# Patient Record
Sex: MALE | Race: BLACK OR AFRICAN AMERICAN | NOT HISPANIC OR LATINO | URBAN - METROPOLITAN AREA
[De-identification: names, ages, dates, MRNs, and addresses within clinical notes are randomized per-mention and may not be internally consistent; named-entity substitution may affect disease eponyms.]

---

## 2018-08-27 ENCOUNTER — EMERGENCY (EMERGENCY)
Facility: HOSPITAL | Age: 63
LOS: 0 days | Discharge: HOME | End: 2018-08-27
Attending: EMERGENCY MEDICINE
Payer: COMMERCIAL

## 2018-08-27 VITALS
OXYGEN SATURATION: 99 % | HEIGHT: 69 IN | WEIGHT: 175.05 LBS | DIASTOLIC BLOOD PRESSURE: 72 MMHG | SYSTOLIC BLOOD PRESSURE: 141 MMHG | RESPIRATION RATE: 18 BRPM | HEART RATE: 72 BPM | TEMPERATURE: 98 F

## 2018-08-27 VITALS
SYSTOLIC BLOOD PRESSURE: 114 MMHG | RESPIRATION RATE: 18 BRPM | HEART RATE: 60 BPM | OXYGEN SATURATION: 98 % | TEMPERATURE: 98 F | DIASTOLIC BLOOD PRESSURE: 71 MMHG

## 2018-08-27 DIAGNOSIS — Z98.89 OTHER SPECIFIED POSTPROCEDURAL STATES: Chronic | ICD-10-CM

## 2018-08-27 DIAGNOSIS — Z90.49 ACQUIRED ABSENCE OF OTHER SPECIFIED PARTS OF DIGESTIVE TRACT: ICD-10-CM

## 2018-08-27 DIAGNOSIS — R42 DIZZINESS AND GIDDINESS: ICD-10-CM

## 2018-08-27 DIAGNOSIS — N20.0 CALCULUS OF KIDNEY: ICD-10-CM

## 2018-08-27 DIAGNOSIS — H53.2 DIPLOPIA: ICD-10-CM

## 2018-08-27 DIAGNOSIS — E78.00 PURE HYPERCHOLESTEROLEMIA, UNSPECIFIED: ICD-10-CM

## 2018-08-27 PROCEDURE — 70450 CT HEAD/BRAIN W/O DYE: CPT | Mod: 26

## 2018-08-27 PROCEDURE — 99284 EMERGENCY DEPT VISIT MOD MDM: CPT

## 2018-08-27 RX ORDER — MECLIZINE HCL 12.5 MG
25 TABLET ORAL ONCE
Qty: 0 | Refills: 0 | Status: COMPLETED | OUTPATIENT
Start: 2018-08-27 | End: 2018-08-27

## 2018-08-27 RX ADMIN — Medication 25 MILLIGRAM(S): at 16:56

## 2018-08-27 NOTE — ED ADULT NURSE NOTE - OBJECTIVE STATEMENT
patient A&Ox3 in no acute distress c/o of dizziness and weakness started last week , patient denied chest pain , denied headache , denied back pain no abdominal pain  , denied difficulty breathing

## 2018-08-27 NOTE — ED PROVIDER NOTE - MEDICAL DECISION MAKING DETAILS
Patient with vertigo over several months, ct head without acute findings, will discharge with omd f/u

## 2018-08-27 NOTE — ED ADULT NURSE NOTE - NSIMPLEMENTINTERV_GEN_ALL_ED
Implemented All Universal Safety Interventions:  [place] to call system. Call bell, personal items and telephone within reach. Instruct patient to call for assistance. Room bathroom lighting operational. Non-slip footwear when patient is off stretcher. Physically safe environment: no spills, clutter or unnecessary equipment. Stretcher in lowest position, wheels locked, appropriate side rails in place.

## 2018-08-27 NOTE — ED ADULT NURSE REASSESSMENT NOTE - NS ED NURSE REASSESS COMMENT FT1
patient A&Ox3 steady gait in no acute distress , discharge as orders no heplock left ER self ambulated with family on his side

## 2018-08-27 NOTE — ED ADULT TRIAGE NOTE - CHIEF COMPLAINT QUOTE
"dizzy" reports feeling dizzy starting several weeks ago, with swelling to right eye starting yesterday, with nausea, denies numbness, tingling, diarrhea, fever, chills, cp, sob, cullen.

## 2018-08-27 NOTE — ED PROVIDER NOTE - OBJECTIVE STATEMENT
62 yo male presents with vertigo and lightheadedness since last year with diplopia to right eye since that time. Patient denies chest pain, shortness of breath, fever, chills, abdominal pain, headache, trauma, head injury, neck pain, weakness, numbness/tingling, change in vision.

## 2018-09-17 ENCOUNTER — EMERGENCY (EMERGENCY)
Facility: HOSPITAL | Age: 63
LOS: 0 days | Discharge: HOME | End: 2018-09-17
Attending: STUDENT IN AN ORGANIZED HEALTH CARE EDUCATION/TRAINING PROGRAM
Payer: COMMERCIAL

## 2018-09-17 VITALS
DIASTOLIC BLOOD PRESSURE: 87 MMHG | HEIGHT: 69 IN | SYSTOLIC BLOOD PRESSURE: 139 MMHG | HEART RATE: 68 BPM | OXYGEN SATURATION: 98 % | WEIGHT: 173.94 LBS | TEMPERATURE: 98 F | RESPIRATION RATE: 17 BRPM

## 2018-09-17 VITALS
DIASTOLIC BLOOD PRESSURE: 89 MMHG | HEART RATE: 66 BPM | RESPIRATION RATE: 18 BRPM | OXYGEN SATURATION: 97 % | TEMPERATURE: 98 F | SYSTOLIC BLOOD PRESSURE: 129 MMHG

## 2018-09-17 DIAGNOSIS — R42 DIZZINESS AND GIDDINESS: ICD-10-CM

## 2018-09-17 DIAGNOSIS — E78.00 PURE HYPERCHOLESTEROLEMIA, UNSPECIFIED: ICD-10-CM

## 2018-09-17 DIAGNOSIS — Z98.89 OTHER SPECIFIED POSTPROCEDURAL STATES: Chronic | ICD-10-CM

## 2018-09-17 LAB
ALBUMIN SERPL ELPH-MCNC: 3.5 G/DL — SIGNIFICANT CHANGE UP (ref 3.3–5)
ALP SERPL-CCNC: 68 U/L — SIGNIFICANT CHANGE UP (ref 40–120)
ALT FLD-CCNC: 26 U/L — SIGNIFICANT CHANGE UP (ref 12–78)
ANION GAP SERPL CALC-SCNC: 8 MMOL/L — SIGNIFICANT CHANGE UP (ref 5–17)
APTT BLD: 35.4 SEC — SIGNIFICANT CHANGE UP (ref 27.5–37.4)
AST SERPL-CCNC: 24 U/L — SIGNIFICANT CHANGE UP (ref 15–37)
BASOPHILS # BLD AUTO: 0.03 K/UL — SIGNIFICANT CHANGE UP (ref 0–0.2)
BASOPHILS NFR BLD AUTO: 0.6 % — SIGNIFICANT CHANGE UP (ref 0–2)
BILIRUB SERPL-MCNC: 0.9 MG/DL — SIGNIFICANT CHANGE UP (ref 0.2–1.2)
BUN SERPL-MCNC: 10 MG/DL — SIGNIFICANT CHANGE UP (ref 7–23)
CALCIUM SERPL-MCNC: 8.1 MG/DL — LOW (ref 8.5–10.1)
CHLORIDE SERPL-SCNC: 106 MMOL/L — SIGNIFICANT CHANGE UP (ref 96–108)
CK MB BLD-MCNC: 0.6 % — SIGNIFICANT CHANGE UP (ref 0–3.5)
CK MB CFR SERPL CALC: 2.4 NG/ML — SIGNIFICANT CHANGE UP (ref 0.5–3.6)
CK SERPL-CCNC: 394 U/L — HIGH (ref 26–308)
CO2 SERPL-SCNC: 26 MMOL/L — SIGNIFICANT CHANGE UP (ref 22–31)
CREAT SERPL-MCNC: 0.78 MG/DL — SIGNIFICANT CHANGE UP (ref 0.5–1.3)
EOSINOPHIL # BLD AUTO: 0.07 K/UL — SIGNIFICANT CHANGE UP (ref 0–0.5)
EOSINOPHIL NFR BLD AUTO: 1.4 % — SIGNIFICANT CHANGE UP (ref 0–6)
GLUCOSE SERPL-MCNC: 92 MG/DL — SIGNIFICANT CHANGE UP (ref 70–99)
HCT VFR BLD CALC: 41.3 % — SIGNIFICANT CHANGE UP (ref 39–50)
HGB BLD-MCNC: 13.4 G/DL — SIGNIFICANT CHANGE UP (ref 13–17)
IMM GRANULOCYTES NFR BLD AUTO: 0.2 % — SIGNIFICANT CHANGE UP (ref 0–1.5)
INR BLD: 1.09 RATIO — SIGNIFICANT CHANGE UP (ref 0.88–1.16)
LYMPHOCYTES # BLD AUTO: 2.36 K/UL — SIGNIFICANT CHANGE UP (ref 1–3.3)
LYMPHOCYTES # BLD AUTO: 46.6 % — HIGH (ref 13–44)
MCHC RBC-ENTMCNC: 27.7 PG — SIGNIFICANT CHANGE UP (ref 27–34)
MCHC RBC-ENTMCNC: 32.4 GM/DL — SIGNIFICANT CHANGE UP (ref 32–36)
MCV RBC AUTO: 85.3 FL — SIGNIFICANT CHANGE UP (ref 80–100)
MONOCYTES # BLD AUTO: 0.73 K/UL — SIGNIFICANT CHANGE UP (ref 0–0.9)
MONOCYTES NFR BLD AUTO: 14.4 % — HIGH (ref 2–14)
NEUTROPHILS # BLD AUTO: 1.86 K/UL — SIGNIFICANT CHANGE UP (ref 1.8–7.4)
NEUTROPHILS NFR BLD AUTO: 36.8 % — LOW (ref 43–77)
NRBC # BLD: 0 /100 WBCS — SIGNIFICANT CHANGE UP (ref 0–0)
PLATELET # BLD AUTO: 246 K/UL — SIGNIFICANT CHANGE UP (ref 150–400)
POTASSIUM SERPL-MCNC: 4 MMOL/L — SIGNIFICANT CHANGE UP (ref 3.5–5.3)
POTASSIUM SERPL-SCNC: 4 MMOL/L — SIGNIFICANT CHANGE UP (ref 3.5–5.3)
PROT SERPL-MCNC: 7 GM/DL — SIGNIFICANT CHANGE UP (ref 6–8.3)
PROTHROM AB SERPL-ACNC: 11.9 SEC — SIGNIFICANT CHANGE UP (ref 9.8–12.7)
RBC # BLD: 4.84 M/UL — SIGNIFICANT CHANGE UP (ref 4.2–5.8)
RBC # FLD: 13.7 % — SIGNIFICANT CHANGE UP (ref 10.3–14.5)
SODIUM SERPL-SCNC: 140 MMOL/L — SIGNIFICANT CHANGE UP (ref 135–145)
TROPONIN I SERPL-MCNC: <.015 NG/ML — SIGNIFICANT CHANGE UP (ref 0.01–0.04)
WBC # BLD: 5.06 K/UL — SIGNIFICANT CHANGE UP (ref 3.8–10.5)
WBC # FLD AUTO: 5.06 K/UL — SIGNIFICANT CHANGE UP (ref 3.8–10.5)

## 2018-09-17 PROCEDURE — 70450 CT HEAD/BRAIN W/O DYE: CPT | Mod: 26

## 2018-09-17 PROCEDURE — 74177 CT ABD & PELVIS W/CONTRAST: CPT | Mod: 26

## 2018-09-17 PROCEDURE — 99284 EMERGENCY DEPT VISIT MOD MDM: CPT

## 2018-09-17 PROCEDURE — 93010 ELECTROCARDIOGRAM REPORT: CPT

## 2018-09-17 PROCEDURE — 70551 MRI BRAIN STEM W/O DYE: CPT | Mod: 26

## 2018-09-17 RX ORDER — SODIUM CHLORIDE 9 MG/ML
3 INJECTION INTRAMUSCULAR; INTRAVENOUS; SUBCUTANEOUS ONCE
Qty: 0 | Refills: 0 | Status: COMPLETED | OUTPATIENT
Start: 2018-09-17 | End: 2018-09-17

## 2018-09-17 RX ORDER — MECLIZINE HCL 12.5 MG
1 TABLET ORAL
Qty: 45 | Refills: 0
Start: 2018-09-17 | End: 2018-10-01

## 2018-09-17 RX ADMIN — SODIUM CHLORIDE 3 MILLILITER(S): 9 INJECTION INTRAMUSCULAR; INTRAVENOUS; SUBCUTANEOUS at 11:01

## 2018-09-17 NOTE — ED PROVIDER NOTE - PROGRESS NOTE DETAILS
pt does feel better c/o sob with exertion, currently on prednisone which his pmd dr loera tappered and feels he needs increased and is on home o2 2l 24 hours dr loera called, case discussed, wants pt to increase hisprednisone to 20mg daily and see him in the office thursday or fridady

## 2018-09-17 NOTE — ED ADULT NURSE NOTE - NSIMPLEMENTINTERV_GEN_ALL_ED
Implemented All Fall Risk Interventions:  [place] to call system. Call bell, personal items and telephone within reach. Instruct patient to call for assistance. Room bathroom lighting operational. Non-slip footwear when patient is off stretcher. Physically safe environment: no spills, clutter or unnecessary equipment. Stretcher in lowest position, wheels locked, appropriate side rails in place. Provide visual cue, wrist band, yellow gown, etc. Monitor gait and stability. Monitor for mental status changes and reorient to person, place, and time. Review medications for side effects contributing to fall risk. Reinforce activity limits and safety measures with patient and family.

## 2018-09-17 NOTE — ED ADULT TRIAGE NOTE - CHIEF COMPLAINT QUOTE
Dizziness, weakness, lightheaded, right lower back and left ankle pains, states he became dizzy and lost consciousness on Sunday and was evaluated by 911 ems after twisting left ankle while going down steps and returning to his sons room.

## 2018-09-17 NOTE — ED PROVIDER NOTE - OBJECTIVE STATEMENT
63 year old male presents today c/o dizziness which has been chronic and intermittent for the last 5-6 months, pt reports that on Friday evening he was helping his son move when he became dizzy and past out for few seconds, his wife states that he last ate the day between 9-10am and this occurred at 11pm, he also states that he twisted his left ankle going down a step, five minutes later he experienced feeling dizzy (-) chest pain (-) sob (-) nausea or vomiting (-) palpitations (-) melena (-) hematuria

## 2018-09-17 NOTE — ED ADULT NURSE NOTE - OBJECTIVE STATEMENT
patient received, alert and oriented x4, came here for dizziness, stated two days ago tripped and fell, sprained left ankle and after 5 minutes felt spinning dizzy like about to pass out, woke up this morning with dizziness, denies n/v, denies chest pain

## 2020-03-24 ENCOUNTER — INPATIENT (INPATIENT)
Facility: HOSPITAL | Age: 65
LOS: 8 days | Discharge: HOME HEALTH | End: 2020-04-02
Attending: INTERNAL MEDICINE | Admitting: INTERNAL MEDICINE
Payer: COMMERCIAL

## 2020-03-24 VITALS
DIASTOLIC BLOOD PRESSURE: 77 MMHG | TEMPERATURE: 101 F | HEART RATE: 85 BPM | WEIGHT: 175.05 LBS | SYSTOLIC BLOOD PRESSURE: 144 MMHG | OXYGEN SATURATION: 96 % | HEIGHT: 67 IN | RESPIRATION RATE: 21 BRPM

## 2020-03-24 DIAGNOSIS — R55 SYNCOPE AND COLLAPSE: ICD-10-CM

## 2020-03-24 DIAGNOSIS — J18.9 PNEUMONIA, UNSPECIFIED ORGANISM: ICD-10-CM

## 2020-03-24 DIAGNOSIS — E78.00 PURE HYPERCHOLESTEROLEMIA, UNSPECIFIED: ICD-10-CM

## 2020-03-24 DIAGNOSIS — Z98.89 OTHER SPECIFIED POSTPROCEDURAL STATES: Chronic | ICD-10-CM

## 2020-03-24 DIAGNOSIS — K57.92 DIVERTICULITIS OF INTESTINE, PART UNSPECIFIED, WITHOUT PERFORATION OR ABSCESS WITHOUT BLEEDING: ICD-10-CM

## 2020-03-24 PROBLEM — H05.821: Chronic | Status: ACTIVE | Noted: 2018-09-17

## 2020-03-24 LAB
ALBUMIN SERPL ELPH-MCNC: 2.4 G/DL — LOW (ref 3.3–5)
ALP SERPL-CCNC: 52 U/L — SIGNIFICANT CHANGE UP (ref 40–120)
ALT FLD-CCNC: 47 U/L — SIGNIFICANT CHANGE UP (ref 12–78)
ANION GAP SERPL CALC-SCNC: 5 MMOL/L — SIGNIFICANT CHANGE UP (ref 5–17)
AST SERPL-CCNC: 74 U/L — HIGH (ref 15–37)
BASOPHILS # BLD AUTO: 0.02 K/UL — SIGNIFICANT CHANGE UP (ref 0–0.2)
BASOPHILS NFR BLD AUTO: 0.3 % — SIGNIFICANT CHANGE UP (ref 0–2)
BILIRUB SERPL-MCNC: 0.8 MG/DL — SIGNIFICANT CHANGE UP (ref 0.2–1.2)
BUN SERPL-MCNC: 15 MG/DL — SIGNIFICANT CHANGE UP (ref 7–23)
CALCIUM SERPL-MCNC: 8 MG/DL — LOW (ref 8.5–10.1)
CHLORIDE SERPL-SCNC: 101 MMOL/L — SIGNIFICANT CHANGE UP (ref 96–108)
CK MB CFR SERPL CALC: <1 NG/ML — SIGNIFICANT CHANGE UP (ref 0.5–3.6)
CO2 SERPL-SCNC: 28 MMOL/L — SIGNIFICANT CHANGE UP (ref 22–31)
CREAT SERPL-MCNC: 1.68 MG/DL — HIGH (ref 0.5–1.3)
EOSINOPHIL # BLD AUTO: 0 K/UL — SIGNIFICANT CHANGE UP (ref 0–0.5)
EOSINOPHIL NFR BLD AUTO: 0 % — SIGNIFICANT CHANGE UP (ref 0–6)
FLU A RESULT: SIGNIFICANT CHANGE UP
FLU A RESULT: SIGNIFICANT CHANGE UP
FLUAV AG NPH QL: SIGNIFICANT CHANGE UP
FLUBV AG NPH QL: SIGNIFICANT CHANGE UP
GLUCOSE SERPL-MCNC: 100 MG/DL — HIGH (ref 70–99)
HCT VFR BLD CALC: 45.5 % — SIGNIFICANT CHANGE UP (ref 39–50)
HGB BLD-MCNC: 15.3 G/DL — SIGNIFICANT CHANGE UP (ref 13–17)
IMM GRANULOCYTES NFR BLD AUTO: 0.7 % — SIGNIFICANT CHANGE UP (ref 0–1.5)
LACTATE SERPL-SCNC: 2.3 MMOL/L — HIGH (ref 0.7–2)
LYMPHOCYTES # BLD AUTO: 0.95 K/UL — LOW (ref 1–3.3)
LYMPHOCYTES # BLD AUTO: 12.4 % — LOW (ref 13–44)
MAGNESIUM SERPL-MCNC: 2.1 MG/DL — SIGNIFICANT CHANGE UP (ref 1.6–2.6)
MCHC RBC-ENTMCNC: 28 PG — SIGNIFICANT CHANGE UP (ref 27–34)
MCHC RBC-ENTMCNC: 33.6 GM/DL — SIGNIFICANT CHANGE UP (ref 32–36)
MCV RBC AUTO: 83.2 FL — SIGNIFICANT CHANGE UP (ref 80–100)
MONOCYTES # BLD AUTO: 1.07 K/UL — HIGH (ref 0–0.9)
MONOCYTES NFR BLD AUTO: 14 % — SIGNIFICANT CHANGE UP (ref 2–14)
NEUTROPHILS # BLD AUTO: 5.55 K/UL — SIGNIFICANT CHANGE UP (ref 1.8–7.4)
NEUTROPHILS NFR BLD AUTO: 72.6 % — SIGNIFICANT CHANGE UP (ref 43–77)
NRBC # BLD: 0 /100 WBCS — SIGNIFICANT CHANGE UP (ref 0–0)
PLATELET # BLD AUTO: 283 K/UL — SIGNIFICANT CHANGE UP (ref 150–400)
POTASSIUM SERPL-MCNC: 3.8 MMOL/L — SIGNIFICANT CHANGE UP (ref 3.5–5.3)
POTASSIUM SERPL-SCNC: 3.8 MMOL/L — SIGNIFICANT CHANGE UP (ref 3.5–5.3)
PROT SERPL-MCNC: 6.9 GM/DL — SIGNIFICANT CHANGE UP (ref 6–8.3)
RBC # BLD: 5.47 M/UL — SIGNIFICANT CHANGE UP (ref 4.2–5.8)
RBC # FLD: 13.2 % — SIGNIFICANT CHANGE UP (ref 10.3–14.5)
RSV RESULT: SIGNIFICANT CHANGE UP
RSV RNA RESP QL NAA+PROBE: SIGNIFICANT CHANGE UP
SODIUM SERPL-SCNC: 134 MMOL/L — LOW (ref 135–145)
TROPONIN I SERPL-MCNC: <.015 NG/ML — SIGNIFICANT CHANGE UP (ref 0.01–0.04)
WBC # BLD: 7.64 K/UL — SIGNIFICANT CHANGE UP (ref 3.8–10.5)
WBC # FLD AUTO: 7.64 K/UL — SIGNIFICANT CHANGE UP (ref 3.8–10.5)

## 2020-03-24 PROCEDURE — 99285 EMERGENCY DEPT VISIT HI MDM: CPT

## 2020-03-24 PROCEDURE — 74177 CT ABD & PELVIS W/CONTRAST: CPT | Mod: 26

## 2020-03-24 PROCEDURE — 99223 1ST HOSP IP/OBS HIGH 75: CPT

## 2020-03-24 PROCEDURE — 93010 ELECTROCARDIOGRAM REPORT: CPT

## 2020-03-24 PROCEDURE — 71045 X-RAY EXAM CHEST 1 VIEW: CPT | Mod: 26

## 2020-03-24 PROCEDURE — 99222 1ST HOSP IP/OBS MODERATE 55: CPT

## 2020-03-24 RX ORDER — PYRIDOSTIGMINE BROMIDE 60 MG/5ML
60 SOLUTION ORAL THREE TIMES A DAY
Refills: 0 | Status: DISCONTINUED | OUTPATIENT
Start: 2020-03-24 | End: 2020-04-02

## 2020-03-24 RX ORDER — ACETAMINOPHEN 500 MG
650 TABLET ORAL EVERY 6 HOURS
Refills: 0 | Status: DISCONTINUED | OUTPATIENT
Start: 2020-03-24 | End: 2020-03-25

## 2020-03-24 RX ORDER — PIPERACILLIN AND TAZOBACTAM 4; .5 G/20ML; G/20ML
3.38 INJECTION, POWDER, LYOPHILIZED, FOR SOLUTION INTRAVENOUS EVERY 8 HOURS
Refills: 0 | Status: DISCONTINUED | OUTPATIENT
Start: 2020-03-24 | End: 2020-03-25

## 2020-03-24 RX ORDER — SODIUM CHLORIDE 9 MG/ML
1000 INJECTION INTRAMUSCULAR; INTRAVENOUS; SUBCUTANEOUS
Refills: 0 | Status: DISCONTINUED | OUTPATIENT
Start: 2020-03-24 | End: 2020-03-29

## 2020-03-24 RX ORDER — PIPERACILLIN AND TAZOBACTAM 4; .5 G/20ML; G/20ML
3.38 INJECTION, POWDER, LYOPHILIZED, FOR SOLUTION INTRAVENOUS ONCE
Refills: 0 | Status: COMPLETED | OUTPATIENT
Start: 2020-03-24 | End: 2020-03-24

## 2020-03-24 RX ORDER — VANCOMYCIN HCL 1 G
1000 VIAL (EA) INTRAVENOUS ONCE
Refills: 0 | Status: COMPLETED | OUTPATIENT
Start: 2020-03-24 | End: 2020-03-24

## 2020-03-24 RX ORDER — AZITHROMYCIN 500 MG/1
500 TABLET, FILM COATED ORAL DAILY
Refills: 0 | Status: DISCONTINUED | OUTPATIENT
Start: 2020-03-24 | End: 2020-03-25

## 2020-03-24 RX ADMIN — PIPERACILLIN AND TAZOBACTAM 200 GRAM(S): 4; .5 INJECTION, POWDER, LYOPHILIZED, FOR SOLUTION INTRAVENOUS at 16:50

## 2020-03-24 RX ADMIN — PYRIDOSTIGMINE BROMIDE 60 MILLIGRAM(S): 60 SOLUTION ORAL at 22:00

## 2020-03-24 RX ADMIN — Medication 250 MILLIGRAM(S): at 16:35

## 2020-03-24 RX ADMIN — AZITHROMYCIN 500 MILLIGRAM(S): 500 TABLET, FILM COATED ORAL at 22:00

## 2020-03-24 NOTE — H&P ADULT - ASSESSMENT
64 years old male with history of [id number] treated diverticultiis presents with syncope     IMPROVE VTE Individual Risk Assessment          RISK                                                          Points  [  ] Previous VTE                                                3  [  ] Thrombophilia                                             2  [  ] Lower limb paralysis                                   2        (unable to hold up >15 seconds)    [  ] Current Cancer                                             2         (within 6 months)  [  ] Immobilization > 24 hrs                              1  [  ] ICU/CCU stay > 24 hours                             1  [  ] Age > 60                                                         1    IMPROVE VTE Score: 2

## 2020-03-24 NOTE — ED PROVIDER NOTE - NSFOLLOWUPINSTRUCTIONS_ED_ALL_ED_FT
Continue to take medication as prescribed.   Follow up with the surgeons at [facility] as soon as possible.

## 2020-03-24 NOTE — H&P ADULT - NSHPREVIEWOFSYSTEMS_GEN_ALL_CORE

## 2020-03-24 NOTE — ED PROVIDER NOTE - OBJECTIVE STATEMENT
63yo male with pmh of MG presents from PMD office for syncopal episode today. Complains of worsening nausea with lack of appetite and subjective fever/chills x 1 week. States that he was diagnosed with diverticulitis 2 weeks ago and put on abx for it. Went to PMD for worsening symptoms today, felt very dizzy and fainted. States that PMD said he did not hit his head. Denies abdominal pain, vomiting, diarrhea, bloody in stool/black stool, chest pain, palpitations, sob, urinary incontinence and other assocaited sx.

## 2020-03-24 NOTE — ED PROVIDER NOTE - PROGRESS NOTE DETAILS
Spoke with gen surg about CT findings - will come down to see pt Surgery states that diverticulitis is not acute. Nausea seems to be from flagyl. Explained to pt he must complete abx and follow up with surgery at [facility]

## 2020-03-24 NOTE — ED PROVIDER NOTE - ATTENDING CONTRIBUTION TO CARE
agree with pa    in brief, mr stevens pw abd pain and fever. on exam, crackles bl. abd soft nt. ct shows diverticulitis with abscess. seen by surgery, no acute intervention recommended. will broad spectrum cover for pna. test for covid. will admit.

## 2020-03-24 NOTE — ED ADULT NURSE NOTE - NSIMPLEMENTINTERV_GEN_ALL_ED
Implemented All Universal Safety Interventions:  [place] to call system. Call bell, personal items and telephone within reach. Instruct patient to call for assistance. Room bathroom lighting operational. Non-slip footwear when patient is off stretcher. Physically safe environment: no spills, clutter or unnecessary equipment. Stretcher in lowest position, wheels locked, appropriate side rails in place.

## 2020-03-24 NOTE — CONSULT NOTE ADULT - SUBJECTIVE AND OBJECTIVE BOX
General Surgery Consult  HPI: 63y/o male with h/o Mystenia Gravis, vertigo, PMH of appendectomy presents from PMD office for syncopal episode today. Pt went to PMD today for follow-up visit, pt states that 2019 he was admitted to [facility] and treated for acute diverticulitis w/ abscess. Pt was supposed to follow-up with surgical service for repeat Ct scan but did not. Last Wed he had severe abdominal pain and was seen again at [facility], CT showed acute diverticulitis he was discharged on antibiotics, pt states that he hasnt been taking abx as prescribed as they make him very nausea and decreased appetite. Pt states that abdominal pain is markedly improved since being on ABX. Denies f/c, no cp or sob, denies vomiting, diarrhea, bloody in stool/black stool, palpitations, urinary incontinence and other associated sx. Pt states that on his last CT scan he was told he had lung abnormalities that were stable and that he should follow-up with his PCP.     Review of Systems:  · CONSTITUTIONAL:   · Constitutional [+]: CHILLS, FEVER  · EYES: no discharge, no irritation, no pain, no redness, and no visual changes.  · ENMT: Ears: no ear pain and no hearing problems. Nose: no nasal congestion and no nasal drainage. Mouth/Throat: no dysphagia, no hoarseness and no throat pain. Neck: no lumps, no pain, no stiffness and no swollen glands.  · CARDIOVASCULAR: no chest pain and no edema.  · RESPIRATORY: no chest pain, no cough, and no shortness of breath.  · GASTROINTESTINAL: - - -  · Gastrointestinal [+]: NAUSEA  · Gastrointestinal [-]: no abdominal pain, no diarrhea, no melena, no vomiting  · GENITOURINARY: no dysuria, no frequency, and no hematuria.  · MUSCULOSKELETAL: no back pain, no gout, no musculoskeletal pain, no neck pain, and no weakness.  · SKIN: no abrasions, no jaundice, no lesions, no pruritis, and no rashes.  · NEURO: no loss of consciousness, no gait abnormality, no headache, no sensory deficits, and no weakness.    PAST MEDICAL HISTORY:  Eye muscle weakness, right  High cholesterol  Kidney stones    PAST SURGICAL HISTORY:  History of appendectomy     Famiy Hx: parents  of natural causes, Sister: DM  Social Hx: denies smoking, alcohol socially  MEDICATIONS:  cannot recall, unable to contact pharmacy    ALLERGIES:  No Known Allergie    VITALS & I/Os:  Vital Signs Last 24 Hrs  T(C): 36.4 (24 Mar 2020 17:50), Max: 38.2 (24 Mar 2020 11:14)  T(F): 97.5 (24 Mar 2020 17:50), Max: 100.8 (24 Mar 2020 11:14)  HR: 85 (24 Mar 2020 11:14) (85 - 85)  BP: 135/65 (24 Mar 2020 17:50) (135/65 - 144/77)  BP(mean): --  RR: 16 (24 Mar 2020 17:50) (16 - 21)  SpO2: 97% (24 Mar 2020 17:50) (96% - 97%)      PHYSICAL EXAM:   · CONSTITUTIONAL: Awake, alert, oriented to person, place, time/situation and in no apparent distress.  · ENMT: NC/AT. Airway patent, Mouth with normal mucosa.  · EYES: Clear bilaterally,PERRL  · CARDIAC: RRR. s1, s2  · RESPIRATORY: Breath sounds clear and equal bilaterally.  · GASTROINTESTINAL: ND, +BS, soft, reducible umbilical hernia, mild ttp of LLQ, no guarding.  · MUSCULOSKELETAL: Spine appears normal, range of motion is not limited, no muscle or joint tenderness  · NEUROLOGICAL: Alert and oriented, no focal deficits, no motor or sensory deficits.  · SKIN: Skin normal color for race, warm, dry and intact. No evidence of rash.      I&O's Summary  LABS:                        15.3   7.64  )-----------( 283      ( 24 Mar 2020 12:48 )             45.5     0324    134<L>  |  101  |  15  ----------------------------<  100<H>  3.8   |  28  |  1.68<H>    Ca    8.0<L>      24 Mar 2020 12:48  Mg     2.1     -24    TPro  6.9  /  Alb  2.4<L>  /  TBili  0.8  /  DBili  x   /  AST  74<H>  /  ALT  47  /  AlkPhos  52  03-24    Lactate: Lactate, Blood: 2.3 mmol/L ( @ 17:49)    CARDIAC MARKERS ( 24 Mar 2020 12:48 )  <.015 ng/mL / x     / x     / x     / <1.0 ng/mL    IMAGING:  < from: CT Abdomen and Pelvis w/ IV Cont (20 @ 15:36) >  FINDINGS:    LOWER CHEST: Patchy consolidative opacity at the right lung base and smaller opacity at the left lung base.    LIVER: Within normal limits.  BILE DUCTS: Normal caliber.  GALLBLADDER: Within normal limits.  SPLEEN: Within normal limits.  PANCREAS: Within normal limits.  ADRENALS: Within normal limits.  KIDNEYS/URETERS: Bilateral parapelvic and left parenchymal cysts. No hydronephrosis.    BLADDER: Within normal limits.  REPRODUCTIVE ORGANS:    BOWEL: No bowel obstruction. Appendix is not identified. Colonic diverticulosis with focal inflammatory stranding involving the proximal sigmoid colon compatible with acute diverticulitis. 2.8 x 2.0 x 1.7 cm fluid collection within the bowel wall is suspicious for intramural abscess (2, 80).  PERITONEUM: No ascites.  VESSELS: Atherosclerotic changes.  RETROPERITONEUM/LYMPH NODES: No lymphadenopathy.    ABDOMINAL WALL: Small fat-containing umbilical hernia. Small bilateral fat-containing inguinal hernias.  BONES: Degenerative changes.    IMPRESSION:     1. Acute diverticulitis of the proximal sigmoid colon with suspected intramural abscess measuring 2.8 x 2.0 x 1.7 cm.  2. Bilateral airspace opacities in the lung bases, right greater than left, suspicious for pneumonia. Findings are somewhat atypical for COVID,

## 2020-03-24 NOTE — H&P ADULT - NSHPPHYSICALEXAM_GEN_ALL_CORE
ICU Vital Signs Last 24 Hrs  T(C): 36.4 (24 Mar 2020 17:50), Max: 38.2 (24 Mar 2020 11:14)  T(F): 97.5 (24 Mar 2020 17:50), Max: 100.8 (24 Mar 2020 11:14)  HR: 85 (24 Mar 2020 11:14) (85 - 85)  BP: 135/65 (24 Mar 2020 17:50) (135/65 - 144/77)  BP(mean): --  ABP: --  ABP(mean): --  RR: 16 (24 Mar 2020 17:50) (16 - 21)  SpO2: 97% (24 Mar 2020 17:50) (96% - 97%)  GENERAL: NAD well-developed  HEAD:  Atraumatic, Normocephalic  EYES: EOMI, PERRLA, conjunctiva and sclera clear  ENMT: No tonsillar erythema, exudates, or enlargement; Moist mucous membranes, Good dentition, No lesions  NECK: Supple, No JVD, Normal thyroid  NERVOUS SYSTEM:  Alert & Oriented X3, Good concentration; Motor Strength 5/5 B/L upper and lower extremities; DTRs 2+ intact and symmetric  CHEST/LUNG: Clear to percussion bilaterally; No rales, rhonchi, wheezing, or rubs  HEART: Regular rate and rhythm; No murmurs, rubs, or gallops  ABDOMEN: Soft, Nontender, Nondistended; Bowel sounds present  EXTREMITIES:  2+ Peripheral Pulses, No clubbing, cyanosis, or edema  LYMPH: No lymphadenopathy   SKIN: No rashes or lesions

## 2020-03-24 NOTE — CONSULT NOTE ADULT - ATTENDING COMMENTS
Patient seen and examined with the surgical team.  The recent CT scan report and images were reviewed and revealed focal inflammation of the proximal sigmoid colon with a 2.8 x 2.0 x 1.7 cm intramural abscess.  He currently denies abdominal pain and has been tolerating clears.  He has been experiencing intermittent nausea which he thinks may be secondary to vertigo from his myasthenia gravis.  He is non-tender.  I explained to Mr. Colin that as this is now his second episode of complicated diverticulitis, he is a candidate for an elective colon resection.  I have therefore recommended that he be evaluated by colorectal surgery.  In the interim, he appears clinically stable and does not warrant an emergent operation.  Unfortunately, due to the size and location of his abscess, percutaneous drainage is likely not an option.  Recommend advancing to a low fiber diet as tolerated and continuing with antibiotics per ID recommendations.  Please call with questions.

## 2020-03-24 NOTE — CONSULT NOTE ADULT - ASSESSMENT
63 y/o male with MG presenting s/p near syncopal episode, CT scan shows acute diverticulitis with possible intramural abscess, also B/l airspace opacities of lung bases  - admit to medical service  - IV hydration if not contraindicated  - cont IV cipro/flagyl  - ID/ Pulm/ GI consult  - f/u COVID swab results  - clear liqid diet  - GI PPX: zofran  - pain management  - no acute surgical intervention at this time

## 2020-03-24 NOTE — ED ADULT TRIAGE NOTE - CHIEF COMPLAINT QUOTE
Ems states, " Pt has been having nausea, and abdominal pain for several days, hx diverticulitis , pt was at md office and synopsized in office " denies sob, admits to  chills and body aches. 18G IV to right ac

## 2020-03-24 NOTE — ED PROVIDER NOTE - CLINICAL SUMMARY MEDICAL DECISION MAKING FREE TEXT BOX
63yo male with pmh of MG presents from PMD office for syncopal episode today after worsening nausea, body aches/chills and dizziness x 1 week. Febrile sating at 96 on RA. Abdomen benign. Low suspicion for GI pathology given symptoms. Already on abx for diverticulitis. Concerning for covid vs pna. Will get labs, NIF given MG and cxr.

## 2020-03-24 NOTE — H&P ADULT - HISTORY OF PRESENT ILLNESS
65yo male with pmh of MG presents from PMD office for syncopal episode today. Complains of worsening nausea with lack of appetite and subjective fever/chills x 1 week. States that he was diagnosed with diverticulitis 2 weeks ago and put on abx for it. Went to PMD for worsening symptoms today, felt very dizzy and fainted. States that PMD said he did not hit his head. Denies abdominal pain, vomiting, diarrhea, bloody in stool/black stool, chest pain, palpitations, sob, urinary incontinence and other assocaited sx.

## 2020-03-25 LAB
ANION GAP SERPL CALC-SCNC: 7 MMOL/L — SIGNIFICANT CHANGE UP (ref 5–17)
BUN SERPL-MCNC: 18 MG/DL — SIGNIFICANT CHANGE UP (ref 7–23)
CALCIUM SERPL-MCNC: 7.8 MG/DL — LOW (ref 8.5–10.1)
CHLORIDE SERPL-SCNC: 103 MMOL/L — SIGNIFICANT CHANGE UP (ref 96–108)
CO2 SERPL-SCNC: 24 MMOL/L — SIGNIFICANT CHANGE UP (ref 22–31)
CREAT SERPL-MCNC: 2.04 MG/DL — HIGH (ref 0.5–1.3)
CRP SERPL-MCNC: 9.27 MG/DL — HIGH (ref 0–0.4)
GLUCOSE SERPL-MCNC: 100 MG/DL — HIGH (ref 70–99)
HCT VFR BLD CALC: 39.9 % — SIGNIFICANT CHANGE UP (ref 39–50)
HCV AB S/CO SERPL IA: 0.14 S/CO — SIGNIFICANT CHANGE UP (ref 0–0.99)
HCV AB SERPL-IMP: SIGNIFICANT CHANGE UP
HGB BLD-MCNC: 12.9 G/DL — LOW (ref 13–17)
LACTATE SERPL-SCNC: 1.4 MMOL/L — SIGNIFICANT CHANGE UP (ref 0.7–2)
LDH SERPL L TO P-CCNC: 608 U/L — HIGH (ref 50–242)
MCHC RBC-ENTMCNC: 27.4 PG — SIGNIFICANT CHANGE UP (ref 27–34)
MCHC RBC-ENTMCNC: 32.3 GM/DL — SIGNIFICANT CHANGE UP (ref 32–36)
MCV RBC AUTO: 84.7 FL — SIGNIFICANT CHANGE UP (ref 80–100)
NRBC # BLD: 0 /100 WBCS — SIGNIFICANT CHANGE UP (ref 0–0)
PLATELET # BLD AUTO: 279 K/UL — SIGNIFICANT CHANGE UP (ref 150–400)
POTASSIUM SERPL-MCNC: 3.5 MMOL/L — SIGNIFICANT CHANGE UP (ref 3.5–5.3)
POTASSIUM SERPL-SCNC: 3.5 MMOL/L — SIGNIFICANT CHANGE UP (ref 3.5–5.3)
RBC # BLD: 4.71 M/UL — SIGNIFICANT CHANGE UP (ref 4.2–5.8)
RBC # FLD: 13.2 % — SIGNIFICANT CHANGE UP (ref 10.3–14.5)
SARS-COV-2 RNA SPEC QL NAA+PROBE: SIGNIFICANT CHANGE UP
SODIUM SERPL-SCNC: 134 MMOL/L — LOW (ref 135–145)
VANCOMYCIN FLD-MCNC: 1.7 UG/ML — SIGNIFICANT CHANGE UP
WBC # BLD: 9.01 K/UL — SIGNIFICANT CHANGE UP (ref 3.8–10.5)
WBC # FLD AUTO: 9.01 K/UL — SIGNIFICANT CHANGE UP (ref 3.8–10.5)

## 2020-03-25 PROCEDURE — 99223 1ST HOSP IP/OBS HIGH 75: CPT

## 2020-03-25 PROCEDURE — 99233 SBSQ HOSP IP/OBS HIGH 50: CPT

## 2020-03-25 RX ORDER — AZITHROMYCIN 500 MG/1
500 TABLET, FILM COATED ORAL EVERY 24 HOURS
Refills: 0 | Status: COMPLETED | OUTPATIENT
Start: 2020-03-25 | End: 2020-03-28

## 2020-03-25 RX ORDER — MEROPENEM 1 G/30ML
1000 INJECTION INTRAVENOUS EVERY 12 HOURS
Refills: 0 | Status: COMPLETED | OUTPATIENT
Start: 2020-03-25 | End: 2020-04-01

## 2020-03-25 RX ORDER — ACETAMINOPHEN 500 MG
650 TABLET ORAL EVERY 6 HOURS
Refills: 0 | Status: DISCONTINUED | OUTPATIENT
Start: 2020-03-25 | End: 2020-04-02

## 2020-03-25 RX ADMIN — Medication 650 MILLIGRAM(S): at 04:58

## 2020-03-25 RX ADMIN — MEROPENEM 100 MILLIGRAM(S): 1 INJECTION INTRAVENOUS at 17:07

## 2020-03-25 RX ADMIN — SODIUM CHLORIDE 75 MILLILITER(S): 9 INJECTION INTRAMUSCULAR; INTRAVENOUS; SUBCUTANEOUS at 02:55

## 2020-03-25 RX ADMIN — SODIUM CHLORIDE 75 MILLILITER(S): 9 INJECTION INTRAMUSCULAR; INTRAVENOUS; SUBCUTANEOUS at 22:58

## 2020-03-25 RX ADMIN — PYRIDOSTIGMINE BROMIDE 60 MILLIGRAM(S): 60 SOLUTION ORAL at 05:43

## 2020-03-25 RX ADMIN — Medication 650 MILLIGRAM(S): at 11:14

## 2020-03-25 RX ADMIN — PIPERACILLIN AND TAZOBACTAM 25 GRAM(S): 4; .5 INJECTION, POWDER, LYOPHILIZED, FOR SOLUTION INTRAVENOUS at 08:29

## 2020-03-25 RX ADMIN — Medication 650 MILLIGRAM(S): at 10:58

## 2020-03-25 RX ADMIN — AZITHROMYCIN 500 MILLIGRAM(S): 500 TABLET, FILM COATED ORAL at 22:55

## 2020-03-25 RX ADMIN — Medication 650 MILLIGRAM(S): at 04:32

## 2020-03-25 RX ADMIN — PYRIDOSTIGMINE BROMIDE 60 MILLIGRAM(S): 60 SOLUTION ORAL at 15:15

## 2020-03-25 RX ADMIN — SODIUM CHLORIDE 75 MILLILITER(S): 9 INJECTION INTRAMUSCULAR; INTRAVENOUS; SUBCUTANEOUS at 15:15

## 2020-03-25 RX ADMIN — PIPERACILLIN AND TAZOBACTAM 25 GRAM(S): 4; .5 INJECTION, POWDER, LYOPHILIZED, FOR SOLUTION INTRAVENOUS at 00:55

## 2020-03-25 RX ADMIN — PYRIDOSTIGMINE BROMIDE 60 MILLIGRAM(S): 60 SOLUTION ORAL at 22:55

## 2020-03-25 NOTE — CONSULT NOTE ADULT - ASSESSMENT
HPI:  63yo male with pmh of MG presents from PMD office for syncopal episode today. Complains of worsening nausea with lack of appetite and subjective fever/chills x 1 week. States that he was diagnosed with diverticulitis 2 weeks ago and put on abx for it. Went to PMD for worsening symptoms today, felt very dizzy and fainted. States that PMD said he did not hit his head. Denies abdominal pain, vomiting, diarrhea, bloody in stool/black stool, chest pain, palpitations, sob, urinary incontinence and other assocaited sx. (24 Mar 2020 19:35)  ------------------------- As Above ------------------------------- Patient seen earlier today  The patient states he was hospitalized for two days with a diagnosis of diverticulitis last week. He was discharged with two antibiotics. He finished one but the second one was not completed because it made him nauseous. He never did have a recurrence of abdominal pain but was very nauseous.   Patient presented with a syncopal episode.   Also had an episode of diverticulitis last October  Still feels nauseous today Tolerated clear liquids today. Had a normal colonoscopy 4 years ago.    Diverticulitis with abscess. Second episode. On Merrem. 1) Full liquid diet and observe  2) CRP  3) old records

## 2020-03-25 NOTE — PROGRESS NOTE ADULT - SUBJECTIVE AND OBJECTIVE BOX
Patient seen and examined bedside resting comfortably.  Reports dull intermittent LLQ abdominal pain, improved since admission, presently 5/10 in severity.  +BMs.  Tolerating clear liquid diet but admits to nausea at times.  C/o dizziness which he believes to be related to myasthenia gravis.    T(F): 101.3 (03-25-20 @ 04:23), Max: 102.7 (03-24-20 @ 23:45)  HR: 94 (03-25-20 @ 04:23) (85 - 96)  BP: 136/71 (03-25-20 @ 04:23) (135/65 - 155/70)  RR: 18 (03-25-20 @ 04:23) (16 - 21)  SpO2: 96% (03-25-20 @ 04:23) (95% - 98%)  Wt(kg): --    PHYSICAL EXAM:  General: NAD, WDWN  Neuro:  Alert & oriented x 3  HEENT: NCAT, EOMI, conjunctiva clear  CV: +S1+S2 regular rate and rhythm  Lung: clear to auscultation bilaterally, respirations nonlabored, good inspiratory effort  Abdomen: soft, NTND. Normoactive BS  Extremities: no pedal edema or calf tenderness noted     LABS:    pending    A/P: 65 y/o male hx MG presenting s/p near syncopal episode,   findings of acute diverticulitis with intramural abscess on CT  b/l airspace opacities likely pneumonia; COVID negative  BARBARA  - continue abx, monitor for fevers, Follow up repeat lactate  - clear liquid diet as tolerated, continue to monitor for abdominal pain, serial exams  - Follow up am labs, IVF added  - continue medical management, consultants  - no acute surgical intervention planned at this time Patient seen and examined bedside resting comfortably.  Reports dull intermittent LLQ abdominal pain, improved since admission, presently 5/10 in severity.  +BMs.  Tolerating clear liquid diet but admits to nausea at times.  C/o dizziness which he believes to be related to myasthenia gravis.    T(F): 101.3 (03-25-20 @ 04:23), Max: 102.7 (03-24-20 @ 23:45)  HR: 94 (03-25-20 @ 04:23) (85 - 96)  BP: 136/71 (03-25-20 @ 04:23) (135/65 - 155/70)  RR: 18 (03-25-20 @ 04:23) (16 - 21)  SpO2: 96% (03-25-20 @ 04:23) (95% - 98%)  Wt(kg): --    PHYSICAL EXAM:  General: NAD, WDWN  Neuro:  Alert & oriented x 3  HEENT: NCAT, EOMI, conjunctiva clear  CV: +S1+S2 regular rate and rhythm  Lung: clear to auscultation bilaterally, respirations nonlabored, good inspiratory effort  Abdomen: soft, NTND. Normoactive BS. No guarding, rebound tenderness or rigidity.  Extremities: no pedal edema or calf tenderness noted     LABS:    pending    A/P: 64M PMH MG presented s/p near syncopal episode,   findings of acute diverticulitis with intramural abscess on CT  b/l airspace opacities likely pneumonia; COVID negative  BARBARA  - continue abx, monitor for fevers, Follow up repeat lactate  - clear liquid diet as tolerated, continue to monitor for abdominal pain, serial exams  - Follow up am labs, IVF added  - continue medical management, tele, consultants  - no acute surgical intervention planned at this time

## 2020-03-25 NOTE — PROGRESS NOTE ADULT - SUBJECTIVE AND OBJECTIVE BOX
Patient is a 64y old  Male who presents with a chief complaint of syncope (25 Mar 2020 16:32)    HPI: 63 y/o male with pmh of MG presents from PMD office for syncopal episode today. Complains of worsening nausea with lack of appetite and subjective fever/chills x 1 week. States that he was diagnosed with diverticulitis 2 weeks ago and put on abx for it. Went to PMD for worsening symptoms today, felt very dizzy and fainted. States that PMD said he did not hit his head. Denies abdominal pain, vomiting, diarrhea, bloody in stool/black stool, chest pain, palpitations, sob, urinary incontinence and other assocaited sx. (24 Mar 2020 19:35)    SUBJECTIVE & OBJECTIVE: Pt seen and examined at bedside. He complains of nausea and dizziness.   PHYSICAL EXAM:  ICU Vital Signs Last 24 Hrs  T(C): 36.8 (25 Mar 2020 16:28), Max: 39.3 (24 Mar 2020 23:45)  T(F): 98.2 (25 Mar 2020 16:28), Max: 102.7 (24 Mar 2020 23:45)  HR: 75 (25 Mar 2020 16:28) (75 - 96)  BP: 128/63 (25 Mar 2020 16:28) (122/57 - 155/70)  RR: 17 (25 Mar 2020 16:28) (16 - 18)  SpO2: 95% (25 Mar 2020 16:28) (95% - 98%)  Daily   Daily   I&O's Detail    24 Mar 2020 07:01  -  25 Mar 2020 07:00  --------------------------------------------------------  IN:  sodium chloride 0.9%.: 300 mL  Total IN: 300 mL  OUT:  Total OUT: 0 mL  Total NET: 300 mL    GENERAL: NAD, well-groomed, well-developed  HEAD:  Atraumatic, Normocephalic  EYES: EOMI, PERRLA, conjunctiva and sclera clear  ENMT: Moist mucous membranes  NECK: Supple, No JVD  NERVOUS SYSTEM:  Alert & Oriented X3, Motor Strength 5/5 B/L upper and lower extremities; DTRs 2+ intact and symmetric  CHEST/LUNG: coarse breath sounds to bases   HEART: Regular rate and rhythm; No murmurs, rubs, or gallops  ABDOMEN: Soft, Nontender, Nondistended; Bowel sounds present  EXTREMITIES:  2+ Peripheral Pulses, No clubbing, cyanosis, or edema    MEDICATIONS  (STANDING):  azithromycin   Tablet 500 milliGRAM(s) Oral every 24 hours  meropenem  IVPB 1000 milliGRAM(s) IV Intermittent every 12 hours  pyridostigmine 60 milliGRAM(s) Oral three times a day  sodium chloride 0.9%. 1000 milliLiter(s) (75 mL/Hr) IV Continuous <Continuous>    MEDICATIONS  (PRN):  acetaminophen   Tablet .. 650 milliGRAM(s) Oral every 6 hours PRN Temp greater or equal to 38C (100.4F), Mild Pain (1 - 3)      LABS:                        12.9   9.01  )-----------( 279      ( 25 Mar 2020 07:22 )             39.9     03-25    134<L>  |  103  |  18  ----------------------------<  100<H>  3.5   |  24  |  2.04<H>    Ca    7.8<L>      25 Mar 2020 07:22  Mg     2.1     03-24    TPro  6.9  /  Alb  2.4<L>  /  TBili  0.8  /  DBili  x   /  AST  74<H>  /  ALT  47  /  AlkPhos  52  03-24    CARDIAC MARKERS ( 24 Mar 2020 12:48 )  <.015 ng/mL / x     / x     / x     / <1.0 ng/mL    RECENT CULTURES:  COVID-19 PCR . (03.24.20 @ 17:38)    COVID-19 PCR: NotDete: LDT -  FLU A B RSV Detection by PCR (03.24.20 @ 12:49)    Flu A Result: [facility]: The Flu A B RSV assay is a Real-Time PCR test for the qualitative  detection and differentiation of Influenza A, Influenza B, and  Respiratory Syncytial Virus on nasopharyngeal swabs. The results should  be interpreted in the context of all clinical and laboratory findings.    Flu B Result: [facility]    RSV Result: NotDete      RADIOLOGY & ADDITIONAL TESTS:  < from: CT Abdomen and Pelvis w/ IV Cont (03.24.20 @ 15:36) >  IMPRESSION: 1. Acute diverticulitis of the proximal sigmoid colon with suspected intramural abscess measuring 2.8 x 2.0 x 1.7 cm.  2. Bilateral airspace opacities in the lung bases, right greater than left, suspicious for pneumonia. Findings are somewhat atypical for COVID, however, correlation with laboratory testing is recommended.  < end of copied text >        DVT/GI ppx  Discussed with pt @ bedside

## 2020-03-25 NOTE — PROGRESS NOTE ADULT - SUBJECTIVE AND OBJECTIVE BOX
Covering General Surgery Service    Pt seen with Dr. Mason bedside. Pt states pain is better but still nauseous. States his vertigo gives him nausea too.   Abdomen exam benign and improved from yesterday. Afebrile , LAbs WNL.   Recommendations:  IVF, IV antibiotics Advance diet to Full Liquids  (as per Dr. Cordova) and follow up with Dr. Ibrahim Colorectal surgeon.  D/W Dr. Ibrahim, She will see patient in hospital if he is still here on friday, otherwise can follow up outpatient.   ID to recommend Antibiotics and duration.     Dr. Ibrahim  [address] [place] [id number]  [phone]

## 2020-03-25 NOTE — PROGRESS NOTE ADULT - ASSESSMENT
64 years old male with history of [id number] treated diverticultiis presents with syncope     Problem/Plan - 1:  ·  Problem: Diverticulitis.  Plan: Merrem  - also has intramural abscess on CT  - evaluated by Surgery and images reviewed by IR, no role for surgical or IR drainage.  Recommend IV abx and Colorectal Eval with Dr. Ibrahim on Friday for ?elective colon resection.   - GI eval appreciated     Problem/Plan - 2:  ·  Problem: CAP (community acquired pneumonia).  Plan: On Zithromax  infectious disease consult- artis  COVID and RVP is negative     Problem/Plan - 3:  ·  Problem: High cholesterol.  Plan: dash.     Problem/Plan - 4:  ·  Problem: Syncope.  Plan: has had recurrent Syncope  - check orthostatics  - check venous dopplers   echocardiagram.       Problem/Plan - 5:  ·  Problem: BARBARA   - would rule out post obstructive uropathy   - bladder scan Q6  and straight cath for residual > 125 ml  - on gentle IVF  - check bmp in am

## 2020-03-25 NOTE — CONSULT NOTE ADULT - ASSESSMENT
65 yo man with PMH of MG presents from PMD office for syncopal episode   Acute diverticulitis / PNA r/o aspiration   CT abd with Acute diverticulitis of the proximal sigmoid colon with suspected intramural abscess measuring 2.8 x 2.0 x 1.7 cm.. Bilateral airspace opacities in the lung bases, right greater than left, suspicious for pneumonia. Findings are somewhat atypical for COVID, however, correlation with laboratory testing is recommended.     COVID 19 NEG   no pain or nausea on exam   on zosyn and zithromax   will switch to meropenem   pt is in RA, breathing ok , except fever  fu cultures   repeat blood cultures for fevers   we will fu  thanks

## 2020-03-25 NOTE — CONSULT NOTE ADULT - SUBJECTIVE AND OBJECTIVE BOX
63 yo man with PMH of MG presents from PMD office for syncopal episode today. Complains of worsening nausea with lack of appetite and subjective fever/chills x 1 week. States that he was diagnosed with diverticulitis 2 weeks ago and put on abx for it. Went to PMD for worsening symptoms today, felt very dizzy and fainted. States that PMD said he did not hit his head. Denies abdominal pain, vomiting, diarrhea, bloody in stool/black stool, chest pain, palpitations, sob, urinary incontinence and other assocaited sx. (24 Mar 2020 19:35)      Allergies    No Known Allergies    Intolerances        MEDICATIONS  (STANDING):  azithromycin   Tablet 500 milliGRAM(s) Oral every 24 hours  piperacillin/tazobactam IVPB.. 3.375 Gram(s) IV Intermittent every 8 hours  pyridostigmine 60 milliGRAM(s) Oral three times a day  sodium chloride 0.9%. 1000 milliLiter(s) (75 mL/Hr) IV Continuous <Continuous>        REVIEW OF SYSTEMS: pt is in RA, tired     CONSTITUTIONAL: No fever  RESPIRATORY: No cough  CARDIOVASCULAR: No chest pain  GASTROINTESTINAL: No abdominal pain. No nausea, vomiting, diarrhea  GENITOURINARY: No dysuria  NEUROLOGICAL: No headaches  EXTREMITY: No pedal edema BLE  SKIN: No itching, burning, rashes, or lesions     VITAL SIGNS:  T(C): 38.4 (03-25-20 @ 11:38), Max: 39.3 (03-24-20 @ 23:45)  T(F): 101.2 (03-25-20 @ 11:38), Max: 102.7 (03-24-20 @ 23:45)  HR: 82 (03-25-20 @ 11:38) (82 - 96)  BP: 122/57 (03-25-20 @ 11:38) (122/57 - 155/70)  RR: 18 (03-25-20 @ 11:38) (16 - 18)  SpO2: 98% (03-25-20 @ 11:38) (95% - 98%)  Wt(kg): --    PHYSICAL EXAM:    GENERAL: not in any distress  HEENT: Neck is supple, normocephalic, atraumatic   CHEST/LUNG: Clear to percussion bilaterally; No rales, rhonchi, wheezing  HEART: Regular rate and rhythm; No murmurs, rubs, or gallops  ABDOMEN: Soft, Nontender, Nondistended; Bowel sounds present, no rebound   EXTREMITIES:  2+ Peripheral Pulses, No clubbing, cyanosis, or edema  GENITOURINARY:   SKIN: No rashes or lesions  BACK: no pressor sore   NERVOUS SYSTEM:  Alert & Oriented     LABS:                         12.9   9.01  )-----------( 279      ( 25 Mar 2020 07:22 )             39.9     03-25    134<L>  |  103  |  18  ----------------------------<  100<H>  3.5   |  24  |  2.04<H>    Ca    7.8<L>      25 Mar 2020 07:22  Mg     2.1     03-24    TPro  6.9  /  Alb  2.4<L>  /  TBili  0.8  /  DBili  x   /  AST  74<H>  /  ALT  47  /  AlkPhos  52  03-24    LIVER FUNCTIONS - ( 24 Mar 2020 12:48 )  Alb: 2.4 g/dL / Pro: 6.9 gm/dL / ALK PHOS: 52 U/L / ALT: 47 U/L / AST: 74 U/L / GGT: x                 CARDIAC MARKERS ( 24 Mar 2020 12:48 )  <.015 ng/mL / x     / x     / x     / <1.0 ng/mL                                  Radiology:

## 2020-03-25 NOTE — CONSULT NOTE ADULT - SUBJECTIVE AND OBJECTIVE BOX
HPI:  65yo male with pmh of MG presents from PMD office for syncopal episode today. Complains of worsening nausea with lack of appetite and subjective fever/chills x 1 week. States that he was diagnosed with diverticulitis 2 weeks ago and put on abx for it. Went to PMD for worsening symptoms today, felt very dizzy and fainted. States that PMD said he did not hit his head. Denies abdominal pain, vomiting, diarrhea, bloody in stool/black stool, chest pain, palpitations, sob, urinary incontinence and other assocaited sx. (24 Mar 2020 19:35)  ------------------------- As Above ------------------------------- Patient seen earlier today  The patient states he was hospitalized for two days with a diagnosis of diverticulitis last week. He was discharged with two antibiotics. He finished one but the second one was not completed because it made him nauseous. He never did have a recurrence of abdominal pain but was very nauseous.   Patient presented with a syncopal episode.   Also had an episode of diverticulitis last October  Still feels nauseous today Tolerated clear liquids today. Had a normal colonoscopy 4 years ago.      PAST MEDICAL & SURGICAL HISTORY   Eye muscle weakness, right  High cholesterol  Kidney stones  History of appendectomy      MEDICATIONS  (STANDING):  azithromycin   Tablet 500 milliGRAM(s) Oral every 24 hours  meropenem  IVPB 1000 milliGRAM(s) IV Intermittent every 12 hours  pyridostigmine 60 milliGRAM(s) Oral three times a day  sodium chloride 0.9%. 1000 milliLiter(s) (75 mL/Hr) IV Continuous <Continuous>    MEDICATIONS  (PRN):  acetaminophen   Tablet .. 650 milliGRAM(s) Oral every 6 hours PRN Temp greater or equal to 38C (100.4F), Mild Pain (1 - 3)      Allergies    No Known Allergies    Intolerances        FAMILY HISTORY:      REVIEW OF SYSTEMS:    CONSTITUTIONAL: No fever, weight loss,   EYES: No eye pain, visual disturbances, or discharge  ENMT:  No difficulty hearing, tinnitus, vertigo; No sinus or throat pain  NECK: No pain or stiffness  BREASTS: No pain, masses, or nipple discharge  RESPIRATORY: No cough, wheezing, chills or hemoptysis; No shortness of breath  CARDIOVASCULAR: No chest pain, palpitations, dizziness, or leg swelling  GASTROINTESTINAL:  See above  GENITOURINARY: No dysuria, frequency, hematuria, or incontinence  NEUROLOGICAL: No headaches, memory loss, loss of strength, numbness, or tremors  SKIN: No itching, burning, rashes, or lesions   LYMPH NODES: No enlarged glands  ENDOCRINE: No heat or cold intolerance; No hair loss  MUSCULOSKELETAL: No joint pain or swelling; No muscle, back, or extremity pain  PSYCHIATRIC: No depression, anxiety, mood swings, or difficulty sleeping  HEME/LYMPH: No easy bruising, or bleeding gums  ALLERGY AND IMMUNOLOGIC: No hives or eczema          SOCIAL HISTORY:    FAMILY HISTORY:      Vital Signs Last 24 Hrs  T(C): 36.8 (25 Mar 2020 16:28), Max: 39.3 (24 Mar 2020 23:45)  T(F): 98.2 (25 Mar 2020 16:28), Max: 102.7 (24 Mar 2020 23:45)  HR: 75 (25 Mar 2020 16:28) (75 - 96)  BP: 128/63 (25 Mar 2020 16:28) (122/57 - 155/70)  BP(mean): --  RR: 17 (25 Mar 2020 16:28) (16 - 18)  SpO2: 95% (25 Mar 2020 16:28) (95% - 98%)    PHYSICAL EXAM:    GENERAL: NAD, well-groomed, well-developed  HEAD:  Atraumatic, Normocephalic  EYES: EOMI, PERRLA, conjunctiva and sclera clear  NECK: Supple, No JVD, Normal thyroid  NERVOUS SYSTEM:  Alert & Oriented X3, Good concentration;   CHEST/LUNG: Clear to percussion bilaterally; No rales, rhonchi, wheezing, or rubs  HEART: Regular rate and rhythm; No murmurs, rubs, or gallops  ABDOMEN: Soft, Nontender, Nondistended; Bowel sounds present  EXTREMITIES:  2+ Peripheral Pulses, No clubbing, cyanosis, or edema  LYMPH: No lymphadenopathy noted   RECTAL:  Defrred   SKIN: No rashes or lesions    LABS:                        12.9   9.01  )-----------( 279      ( 25 Mar 2020 07:22 )             39.9       CBC:  03-25 @ 07:22  WBC  9.01  HGB 12.9  HCT 39.9 Plate 279  MCV 84.7  03-24 @ 12:48  WBC  7.64  HGB 15.3  HCT 45.5 Plate 283  MCV 83.2           25 Mar 2020 07:22    134    |  103    |  18     ----------------------------<  100    3.5     |  24     |  2.04   24 Mar 2020 12:48    134    |  101    |  15     ----------------------------<  100    3.8     |  28     |  1.68     Ca    7.8        25 Mar 2020 07:22  Ca    8.0        24 Mar 2020 12:48  Mg     2.1       24 Mar 2020 12:48    TPro  6.9    /  Alb  2.4    /  TBili  0.8    /  DBili  x      /  AST  74     /  ALT  47     /  AlkPhos  52     24 Mar 2020 12:48        C-Reactive Protein, Serum: 9.27 mg/dL (03-25 @ 14:03)      RADIOLOGY & ADDITIONAL STUDIES:

## 2020-03-26 PROCEDURE — 99232 SBSQ HOSP IP/OBS MODERATE 35: CPT

## 2020-03-26 PROCEDURE — 99233 SBSQ HOSP IP/OBS HIGH 50: CPT

## 2020-03-26 RX ORDER — ONDANSETRON 8 MG/1
4 TABLET, FILM COATED ORAL EVERY 6 HOURS
Refills: 0 | Status: DISCONTINUED | OUTPATIENT
Start: 2020-03-26 | End: 2020-04-02

## 2020-03-26 RX ORDER — ONDANSETRON 8 MG/1
4 TABLET, FILM COATED ORAL EVERY 6 HOURS
Refills: 0 | Status: DISCONTINUED | OUTPATIENT
Start: 2020-03-26 | End: 2020-03-26

## 2020-03-26 RX ADMIN — MEROPENEM 100 MILLIGRAM(S): 1 INJECTION INTRAVENOUS at 05:32

## 2020-03-26 RX ADMIN — ONDANSETRON 4 MILLIGRAM(S): 8 TABLET, FILM COATED ORAL at 11:37

## 2020-03-26 RX ADMIN — AZITHROMYCIN 500 MILLIGRAM(S): 500 TABLET, FILM COATED ORAL at 21:35

## 2020-03-26 RX ADMIN — Medication 650 MILLIGRAM(S): at 01:36

## 2020-03-26 RX ADMIN — Medication 650 MILLIGRAM(S): at 11:36

## 2020-03-26 RX ADMIN — PYRIDOSTIGMINE BROMIDE 60 MILLIGRAM(S): 60 SOLUTION ORAL at 05:32

## 2020-03-26 RX ADMIN — PYRIDOSTIGMINE BROMIDE 60 MILLIGRAM(S): 60 SOLUTION ORAL at 13:16

## 2020-03-26 RX ADMIN — PYRIDOSTIGMINE BROMIDE 60 MILLIGRAM(S): 60 SOLUTION ORAL at 21:35

## 2020-03-26 RX ADMIN — Medication 650 MILLIGRAM(S): at 02:02

## 2020-03-26 RX ADMIN — SODIUM CHLORIDE 75 MILLILITER(S): 9 INJECTION INTRAMUSCULAR; INTRAVENOUS; SUBCUTANEOUS at 11:39

## 2020-03-26 RX ADMIN — MEROPENEM 100 MILLIGRAM(S): 1 INJECTION INTRAVENOUS at 17:18

## 2020-03-26 NOTE — PROGRESS NOTE ADULT - ASSESSMENT
HPI:  63yo male with pmh of MG presents from PMD office for syncopal episode today. Complains of worsening nausea with lack of appetite and subjective fever/chills x 1 week. States that he was diagnosed with diverticulitis 2 weeks ago and put on abx for it. Went to PMD for worsening symptoms today, felt very dizzy and fainted. States that PMD said he did not hit his head. Denies abdominal pain, vomiting, diarrhea, bloody in stool/black stool, chest pain, palpitations, sob, urinary incontinence and other assocaited sx. (24 Mar 2020 19:35)  ------------------------- As Above ------------------------------- Patient seen earlier today  The patient states he was hospitalized for two days with a diagnosis of diverticulitis last week. He was discharged with two antibiotics. He finished one but the second one was not completed because it made him nauseous. He never did have a recurrence of abdominal pain but was very nauseous.   Patient presented with a syncopal episode.   Also had an episode of diverticulitis last October  Still feels nauseous today Tolerated clear liquids today. Had a normal colonoscopy 4 years ago.    Diverticulitis with abscess. Second episode. On Merrem. CRP 9.27  Tolerated full liquids 1) Advance diet and observe  2) F/U CRP  3) old records

## 2020-03-26 NOTE — PROGRESS NOTE ADULT - SUBJECTIVE AND OBJECTIVE BOX
Patient is a 64y old  Male who presents with a chief complaint of syncope (26 Mar 2020 05:43)      HPI:  65yo male with pmh of MG presents from PMD office for syncopal episode today. Complains of worsening nausea with lack of appetite and subjective fever/chills x 1 week. States that he was diagnosed with diverticulitis 2 weeks ago and put on abx for it. Went to PMD for worsening symptoms today, felt very dizzy and fainted. States that PMD said he did not hit his head. Denies abdominal pain, vomiting, diarrhea, bloody in stool/black stool, chest pain, palpitations, sob, urinary incontinence and other assocaited sx. (24 Mar 2020 19:35)      INTERVAL HPI/OVERNIGHT EVENTS: Patient seen earlier today.   Less nausea. Feels stronger. No abdominal pain. Tolerated full liquids. Wants to try solid diet. The patient denies melena, hematochezia, hematemesis, nausea, vomiting, abdominal pain, constipation, diarrhea, or change in bowel movements     MEDICATIONS  (STANDING):  azithromycin   Tablet 500 milliGRAM(s) Oral every 24 hours  meropenem  IVPB 1000 milliGRAM(s) IV Intermittent every 12 hours  pyridostigmine 60 milliGRAM(s) Oral three times a day  sodium chloride 0.9%. 1000 milliLiter(s) (75 mL/Hr) IV Continuous <Continuous>    MEDICATIONS  (PRN):  acetaminophen   Tablet .. 650 milliGRAM(s) Oral every 6 hours PRN Temp greater or equal to 38C (100.4F), Mild Pain (1 - 3)  ondansetron Injectable 4 milliGRAM(s) IV Push every 6 hours PRN Nausea and/or Vomiting      FAMILY HISTORY:      Allergies    No Known Allergies    Intolerances        PMH/PSH:  Eye muscle weakness, right  High cholesterol  Kidney stones  History of appendectomy        REVIEW OF SYSTEMS:  CONSTITUTIONAL: No fever, weight loss,   EYES: No eye pain, visual disturbances, or discharge  ENMT:  No difficulty hearing, tinnitus, vertigo; No sinus or throat pain  NECK: No pain or stiffness  BREASTS: No pain, masses, or nipple discharge  RESPIRATORY: No cough, wheezing, chills or hemoptysis; No shortness of breath  CARDIOVASCULAR: No chest pain, palpitations, dizziness, or leg swelling  GASTROINTESTINAL: See above  GENITOURINARY: No dysuria, frequency, hematuria, or incontinence  NEUROLOGICAL: No headaches, memory loss, loss of strength, numbness, or tremors  SKIN: No itching, burning, rashes, or lesions   LYMPH NODES: No enlarged glands  ENDOCRINE: No heat or cold intolerance; No hair loss  MUSCULOSKELETAL: No joint pain or swelling; No muscle, back, or extremity pain  PSYCHIATRIC: No depression, anxiety, mood swings, or difficulty sleeping  HEME/LYMPH: No easy bruising, or bleeding gums  ALLERGY AND IMMUNOLOGIC: No hives or eczema    Vital Signs Last 24 Hrs  T(C): 37.9 (26 Mar 2020 11:35), Max: 39.1 (26 Mar 2020 01:25)  T(F): 100.2 (26 Mar 2020 11:35), Max: 102.3 (26 Mar 2020 01:25)  HR: 90 (26 Mar 2020 11:35) (73 - 90)  BP: 146/76 (26 Mar 2020 11:35) (128/63 - 146/76)  BP(mean): --  RR: 18 (26 Mar 2020 11:35) (17 - 18)  SpO2: 96% (26 Mar 2020 11:35) (95% - 96%)    PHYSICAL EXAM:  GENERAL: NAD, well-groomed, well-developed  HEAD:  Atraumatic, Normocephalic  EYES: EOMI, PERRLA, conjunctiva and sclera clear  NECK: Supple, No JVD, Normal thyroid  NERVOUS SYSTEM:  Alert & Oriented X3, Good concentration;   CHEST/LUNG: Clear to percussion bilaterally; No rales, rhonchi, wheezing, or rubs  HEART: Regular rate and rhythm; No murmurs, rubs, or gallops  ABDOMEN: Soft, Nontender, Nondistended; Bowel sounds present  EXTREMITIES:  2+ Peripheral Pulses, No clubbing, cyanosis, or edema  LYMPH: No lymphadenopathy noted  SKIN: No rashes or lesions    LAB                          12.9   9.01  )-----------( 279      ( 25 Mar 2020 07:22 )             39.9       CBC:  03-25 @ 07:22  WBC 9.01   Hgb 12.9   Hct 39.9   Plts 279  MCV 84.7  03-24 @ 12:48  WBC 7.64   Hgb 15.3   Hct 45.5   Plts 283  MCV 83.2      Chemistry:  03-25 @ 07:22  Na+ 134  K+ 3.5  Cl- 103  CO2 24  BUN 18  Cr 2.04     03-24 @ 12:48  Na+ 134  K+ 3.8  Cl- 101  CO2 28  BUN 15  Cr 1.68         Glucose, Serum: 100 mg/dL (03-25 @ 07:22)  Glucose, Serum: 100 mg/dL (03-24 @ 12:48)      25 Mar 2020 07:22    134    |  103    |  18     ----------------------------<  100    3.5     |  24     |  2.04   24 Mar 2020 12:48    134    |  101    |  15     ----------------------------<  100    3.8     |  28     |  1.68     Ca    7.8        25 Mar 2020 07:22  Ca    8.0        24 Mar 2020 12:48  Mg     2.1       24 Mar 2020 12:48    TPro  6.9    /  Alb  2.4    /  TBili  0.8    /  DBili  x      /  AST  74     /  ALT  47     /  AlkPhos  52     24 Mar 2020 12:48              CAPILLARY BLOOD GLUCOSE          C-Reactive Protein, Serum: 9.27 mg/dL (03-25 @ 14:03)      RADIOLOGY & ADDITIONAL TESTS:    Imaging Personally Reviewed:  [ ] YES  [ ] NO    Consultant(s) Notes Reviewed:  [ ] YES  [ ] NO    Care Discussed with Consultants/Other Providers [ ] YES  [ ] NO

## 2020-03-26 NOTE — PROGRESS NOTE ADULT - SUBJECTIVE AND OBJECTIVE BOX
Pt seen and examined at bedside, states abdominal pain has resolved and he just has some discomfort in LLQ. Pt still with fevers (Tmax 102.3). Tolerating Full liquid diet, still c/o mild nausea but denies receiving any meds for nausea. Denies CP/SOB, denies dizziness or weakness.    T(F): 99.1 (03-26-20 @ 05:20), Max: 102.3 (03-26-20 @ 01:25)  HR: 73 (03-26-20 @ 05:20) (73 - 88)  BP: 134/77 (03-26-20 @ 05:20) (122/57 - 136/75)  RR: 17 (03-26-20 @ 05:20) (17 - 18)  SpO2: 96% (03-26-20 @ 05:20) (95% - 98%)  Wt(kg): --  CAPILLARY BLOOD GLUCOSE        PHYSICAL EXAM:  General: NAD, WDWN  Neuro:  Alert & oriented x 3  HEENT: NCAT, EOMI, conjunctiva clear  CV: +S1+S2 regular rate and rhythm  Lung:  respirations nonlabored, good inspiratory effort  Abdomen: soft, NTND. Normoactive BS, mild ttp to llq on deep palpation  Extremities: no pedal edema or calf tenderness noted     LABS:                        12.9   9.01  )-----------( 279      ( 25 Mar 2020 07:22 )             39.9     03-25    134<L>  |  103  |  18  ----------------------------<  100<H>  3.5   |  24  |  2.04<H>    Ca    7.8<L>      25 Mar 2020 07:22  Mg     2.1     03-24    TPro  6.9  /  Alb  2.4<L>  /  TBili  0.8  /  DBili  x   /  AST  74<H>  /  ALT  47  /  AlkPhos  52  03-24      I&O's Detail    24 Mar 2020 07:01  -  25 Mar 2020 07:00  --------------------------------------------------------  IN:    sodium chloride 0.9%.: 300 mL  Total IN: 300 mL    OUT:  Total OUT: 0 mL    Total NET: 300 mL      25 Mar 2020 07:01  -  26 Mar 2020 06:21  --------------------------------------------------------  IN:    sodium chloride 0.9%.: 900 mL  Total IN: 900 mL    OUT:  Total OUT: 0 mL    Total NET: 900 mL        Culture Results:   No growth to date. (03-24 @ 18:21)  Culture Results:   No growth to date. (03-24 @ 18:21)      Impression: 64y Male admitted s/p syncope, a/w diverticulitis on ABX, now with BARBARA, possible PNA with fevers    PMH   Eye muscle weakness, right  High cholesterol  Kidney stones    Plan:  - cont abx per ID  - antipyretics prn  - cont GI ppx  - advance diet if ok with GI  - continue VTE prophylaxis   - IVF  - f/u AM labs  - cont medical management  - will discuss with surgical attending

## 2020-03-26 NOTE — PROGRESS NOTE ADULT - ASSESSMENT
63 yo man with PMH of MG presents from PMD office for syncopal episode   Acute diverticulitis with abscess. Second episode.    PNA r/o aspiration   CT abd with Acute diverticulitis of the proximal sigmoid colon with suspected intramural abscess measuring 2.8 x 2.0 x 1.7 cm.. Bilateral airspace opacities in the lung bases, right greater than left, suspicious for pneumonia. Findings are somewhat atypical for COVID, however, correlation with laboratory testing is recommended.     COVID 19 NEG   no pain or nausea on exam   s/p zosyn and zithromax   switched to meropenem   pt is in RA, breathing ok , except fever  fu cultures   repeat blood cultures neg   cc of nausea on exam, no vomitting or abd pain   consider IR aspiration of abscess   advance diet   may need iv abx for about 2 weeks upon discharge

## 2020-03-26 NOTE — PROGRESS NOTE ADULT - SUBJECTIVE AND OBJECTIVE BOX
Patient is a 64y old  Male who presents with a chief complaint of syncope (26 Mar 2020 17:42)    HPI:  65yo male with pmh of MG presents from PMD office for syncopal episode today. Complains of worsening nausea with lack of appetite and subjective fever/chills x 1 week. States that he was diagnosed with diverticulitis 2 weeks ago and put on abx for it. Went to PMD for worsening symptoms today, felt very dizzy and fainted. States that PMD said he did not hit his head. Denies abdominal pain, vomiting, diarrhea, bloody in stool/black stool, chest pain, palpitations, sob, urinary incontinence and other assocaited sx. (24 Mar 2020 19:35)    SUBJECTIVE & OBJECTIVE: Pt seen and examined at bedside.   PHYSICAL EXAM:  ICU Vital Signs Last 24 Hrs  T(C): 36.8 (26 Mar 2020 17:10), Max: 39.1 (26 Mar 2020 01:25)  T(F): 98.2 (26 Mar 2020 17:10), Max: 102.3 (26 Mar 2020 01:25)  HR: 67 (26 Mar 2020 17:10) (67 - 90)  BP: 126/69 (26 Mar 2020 17:10) (126/69 - 146/76)  BP(mean): --  ABP: --  ABP(mean): --  RR: 17 (26 Mar 2020 17:10) (17 - 18)  SpO2: 98% (26 Mar 2020 17:10) (96% - 98%)  Daily     Daily Weight in k.6 (26 Mar 2020 05:20)  I&O's Detail    25 Mar 2020 07:01  -  26 Mar 2020 07:00  --------------------------------------------------------  IN:    sodium chloride 0.9%.: 1800 mL    Solution: 50 mL  Total IN: 1850 mL    OUT:  Total OUT: 0 mL    Total NET: 1850 mL      26 Mar 2020 07:01  -  26 Mar 2020 23:20  --------------------------------------------------------  IN:    Oral Fluid: 480 mL    sodium chloride 0.9%.: 850 mL    Solution: 50 mL  Total IN: 1380 mL    OUT:    Voided: 500 mL  Total OUT: 500 mL    Total NET: 880 mL        GENERAL: NAD, well-groomed, well-developed  HEAD:  Atraumatic, Normocephalic  EYES: EOMI, PERRLA, conjunctiva and sclera clear  ENMT: Moist mucous membranes  NECK: Supple, No JVD  NERVOUS SYSTEM:  Alert & Oriented X3, Motor Strength 5/5 B/L upper and lower extremities; DTRs 2+ intact and symmetric  CHEST/LUNG: Clear to auscultation bilaterally; No rales, rhonchi, wheezing, or rubs  HEART: Regular rate and rhythm; No murmurs, rubs, or gallops  ABDOMEN: Soft, Nontender, Nondistended; Bowel sounds present  EXTREMITIES:  2+ Peripheral Pulses, No clubbing, cyanosis, or edema    MEDICATIONS  (STANDING):  azithromycin   Tablet 500 milliGRAM(s) Oral every 24 hours  meropenem  IVPB 1000 milliGRAM(s) IV Intermittent every 12 hours  pyridostigmine 60 milliGRAM(s) Oral three times a day  sodium chloride 0.9%. 1000 milliLiter(s) (75 mL/Hr) IV Continuous <Continuous>    MEDICATIONS  (PRN):  acetaminophen   Tablet .. 650 milliGRAM(s) Oral every 6 hours PRN Temp greater or equal to 38C (100.4F), Mild Pain (1 - 3)  ondansetron Injectable 4 milliGRAM(s) IV Push every 6 hours PRN Nausea and/or Vomiting      LABS:                        12.9   9.01  )-----------( 279      ( 25 Mar 2020 07:22 )             39.9     03-25    134<L>  |  103  |  18  ----------------------------<  100<H>  3.5   |  24  |  2.04<H>    Ca    7.8<L>      25 Mar 2020 07:22          CAPILLARY BLOOD GLUCOSE                RECENT CULTURES:   @ 18:21   No growth to date.  --  --    Urine culture:   @ 18:21 --   No growth to date.      RADIOLOGY & ADDITIONAL TESTS:        DVT/GI ppx  Discussed with pt @ bedside

## 2020-03-26 NOTE — PROGRESS NOTE ADULT - ATTENDING COMMENTS
Advance diet per GI  Continue antibiotics per ID recommendations  F/u colorectal surgery recommendations (Dr. Martinez)
Patient seen and examined with the surgical team.  The recent CT scan report and images were reviewed and revealed focal inflammation of the proximal sigmoid colon with a 2.8 x 2.0 x 1.7 cm intramural abscess.  He currently denies abdominal pain and has been tolerating clears.  He has been experiencing intermittent nausea which he thinks may be secondary to vertigo from his myasthenia gravis.  He is non-tender.  I explained to Mr. Colin that as this is now his second episode of complicated diverticulitis, he is a candidate for an elective colon resection.  I have therefore recommended that he be evaluated by colorectal surgery.  In the interim, he appears clinically stable and does not warrant an emergent operation.  Unfortunately, due to the size and location of his abscess, percutaneous drainage is likely not an option.  Recommend advancing to a low fiber diet as tolerated and continuing with antibiotics per ID recommendations.  Please call with questions.

## 2020-03-26 NOTE — PROGRESS NOTE ADULT - ASSESSMENT
64 years old male with history of recently treated diverticultiis presents with syncope     Problem/Plan - 1:  ·  Problem: Diverticulitis.  Plan: Merrem  - also has intramural abscess on CT  - evaluated by Surgery and images reviewed by IR, no role for surgical or IR drainage.  Recommend IV abx and Colorectal Eval with Dr. Ibrahim on Friday for ?elective colon resection.   - Message left with Dr. Ibrahim's service.  Awaiting call back.. Would call again in AM  - GI eval appreciated     Problem/Plan - 2:  ·  Problem: CAP (community acquired pneumonia).  Plan: On Zithromax  infectious disease consult- artis  COVID and RVP is negative     Problem/Plan - 3:  ·  Problem: High cholesterol.  Plan: dash.     Problem/Plan - 4:  ·  Problem: Syncope.  Plan: has had recurrent Syncope  - check orthostatics  - check venous dopplers   echocardiagram.       Problem/Plan - 5:  ·  Problem: BARBARA   - would rule out post obstructive uropathy   - bladder scan Q6  and straight cath for residual > 125 ml  - on gentle IVF  - check bmp in am

## 2020-03-26 NOTE — PROGRESS NOTE ADULT - SUBJECTIVE AND OBJECTIVE BOX
HPI:  63yo male with pmh of MG presents from PMD office for syncopal episode today. Complains of worsening nausea with lack of appetite and subjective fever/chills x 1 week. States that he was diagnosed with diverticulitis 2 weeks ago and put on abx for it. Went to PMD for worsening symptoms today, felt very dizzy and fainted. States that PMD said he did not hit his head. Denies abdominal pain, vomiting, diarrhea, bloody in stool/black stool, chest pain, palpitations, sob, urinary incontinence and other assocaited sx. (24 Mar 2020 19:35)      Allergies    No Known Allergies    Intolerances        MEDICATIONS  (STANDING):  azithromycin   Tablet 500 milliGRAM(s) Oral every 24 hours  meropenem  IVPB 1000 milliGRAM(s) IV Intermittent every 12 hours  pyridostigmine 60 milliGRAM(s) Oral three times a day  sodium chloride 0.9%. 1000 milliLiter(s) (75 mL/Hr) IV Continuous <Continuous>    MEDICATIONS  (PRN):  acetaminophen   Tablet .. 650 milliGRAM(s) Oral every 6 hours PRN Temp greater or equal to 38C (100.4F), Mild Pain (1 - 3)  ondansetron Injectable 4 milliGRAM(s) IV Push every 6 hours PRN Nausea and/or Vomiting      REVIEW OF SYSTEMS:    CONSTITUTIONAL: No fever, chills, weight loss, or fatigue  HEENT: No sore throat, runny nose, ear ache  RESPIRATORY: No cough, wheezing, No shortness of breath  CARDIOVASCULAR: No chest pain, palpitations, dizziness  GASTROINTESTINAL: No abdominal pain. No nausea, vomiting, diarrhea  GENITOURINARY: No dysuria, increase frequency, hematuria, or incontinence  NEUROLOGICAL: No headaches, memory loss, loss of strength, numbness, or tremors, no weakness  EXTREMITY: No pedal edema BLE  SKIN: No itching, burning, rashes, or lesions     VITAL SIGNS:  T(C): 36.8 (03-26-20 @ 17:10), Max: 39.1 (03-26-20 @ 01:25)  T(F): 98.2 (03-26-20 @ 17:10), Max: 102.3 (03-26-20 @ 01:25)  HR: 67 (03-26-20 @ 17:10) (67 - 90)  BP: 126/69 (03-26-20 @ 17:10) (126/69 - 146/76)  RR: 17 (03-26-20 @ 17:10) (17 - 18)  SpO2: 98% (03-26-20 @ 17:10) (96% - 98%)  Wt(kg): --    PHYSICAL EXAM:    GENERAL: not in any distress  HEENT: Neck is supple, normocephalic, atraumatic   CHEST/LUNG: Clear to percussion bilaterally; No rales, rhonchi, wheezing  HEART: Regular rate and rhythm; No murmurs, rubs, or gallops  ABDOMEN: Soft, Nontender, Nondistended; Bowel sounds present, no rebound   EXTREMITIES:  2+ Peripheral Pulses, No clubbing, cyanosis, or edema  GENITOURINARY:   SKIN: No rashes or lesions  BACK: no pressor sore   NERVOUS SYSTEM:  Alert & Oriented X3, Good concentration  PSYCH: normal affect     LABS:                         12.9   9.01  )-----------( 279      ( 25 Mar 2020 07:22 )             39.9     03-25    134<L>  |  103  |  18  ----------------------------<  100<H>  3.5   |  24  |  2.04<H>    Ca    7.8<L>      25 Mar 2020 07:22                                Culture Results:   No growth to date. (03-24 @ 18:21)  Culture Results:   No growth to date. (03-24 @ 18:21)                Radiology:

## 2020-03-27 LAB
ANION GAP SERPL CALC-SCNC: 6 MMOL/L — SIGNIFICANT CHANGE UP (ref 5–17)
BUN SERPL-MCNC: 22 MG/DL — SIGNIFICANT CHANGE UP (ref 7–23)
CALCIUM SERPL-MCNC: 7.8 MG/DL — LOW (ref 8.5–10.1)
CHLORIDE SERPL-SCNC: 106 MMOL/L — SIGNIFICANT CHANGE UP (ref 96–108)
CO2 SERPL-SCNC: 26 MMOL/L — SIGNIFICANT CHANGE UP (ref 22–31)
CREAT SERPL-MCNC: 2.37 MG/DL — HIGH (ref 0.5–1.3)
CRP SERPL-MCNC: 11.33 MG/DL — HIGH (ref 0–0.4)
GLUCOSE SERPL-MCNC: 113 MG/DL — HIGH (ref 70–99)
HCT VFR BLD CALC: 36.3 % — LOW (ref 39–50)
HGB BLD-MCNC: 12 G/DL — LOW (ref 13–17)
MCHC RBC-ENTMCNC: 27.9 PG — SIGNIFICANT CHANGE UP (ref 27–34)
MCHC RBC-ENTMCNC: 33.1 GM/DL — SIGNIFICANT CHANGE UP (ref 32–36)
MCV RBC AUTO: 84.4 FL — SIGNIFICANT CHANGE UP (ref 80–100)
NRBC # BLD: 0 /100 WBCS — SIGNIFICANT CHANGE UP (ref 0–0)
PLATELET # BLD AUTO: 337 K/UL — SIGNIFICANT CHANGE UP (ref 150–400)
POTASSIUM SERPL-MCNC: 3.7 MMOL/L — SIGNIFICANT CHANGE UP (ref 3.5–5.3)
POTASSIUM SERPL-SCNC: 3.7 MMOL/L — SIGNIFICANT CHANGE UP (ref 3.5–5.3)
RBC # BLD: 4.3 M/UL — SIGNIFICANT CHANGE UP (ref 4.2–5.8)
RBC # FLD: 13.4 % — SIGNIFICANT CHANGE UP (ref 10.3–14.5)
SODIUM SERPL-SCNC: 138 MMOL/L — SIGNIFICANT CHANGE UP (ref 135–145)
WBC # BLD: 7.23 K/UL — SIGNIFICANT CHANGE UP (ref 3.8–10.5)
WBC # FLD AUTO: 7.23 K/UL — SIGNIFICANT CHANGE UP (ref 3.8–10.5)

## 2020-03-27 PROCEDURE — 99222 1ST HOSP IP/OBS MODERATE 55: CPT

## 2020-03-27 PROCEDURE — 99232 SBSQ HOSP IP/OBS MODERATE 35: CPT

## 2020-03-27 RX ADMIN — SODIUM CHLORIDE 75 MILLILITER(S): 9 INJECTION INTRAMUSCULAR; INTRAVENOUS; SUBCUTANEOUS at 23:17

## 2020-03-27 RX ADMIN — SODIUM CHLORIDE 75 MILLILITER(S): 9 INJECTION INTRAMUSCULAR; INTRAVENOUS; SUBCUTANEOUS at 14:04

## 2020-03-27 RX ADMIN — ONDANSETRON 4 MILLIGRAM(S): 8 TABLET, FILM COATED ORAL at 23:18

## 2020-03-27 RX ADMIN — AZITHROMYCIN 500 MILLIGRAM(S): 500 TABLET, FILM COATED ORAL at 23:17

## 2020-03-27 RX ADMIN — Medication 650 MILLIGRAM(S): at 01:40

## 2020-03-27 RX ADMIN — Medication 650 MILLIGRAM(S): at 00:48

## 2020-03-27 RX ADMIN — MEROPENEM 100 MILLIGRAM(S): 1 INJECTION INTRAVENOUS at 05:52

## 2020-03-27 RX ADMIN — PYRIDOSTIGMINE BROMIDE 60 MILLIGRAM(S): 60 SOLUTION ORAL at 14:03

## 2020-03-27 RX ADMIN — PYRIDOSTIGMINE BROMIDE 60 MILLIGRAM(S): 60 SOLUTION ORAL at 23:17

## 2020-03-27 RX ADMIN — ONDANSETRON 4 MILLIGRAM(S): 8 TABLET, FILM COATED ORAL at 11:44

## 2020-03-27 RX ADMIN — PYRIDOSTIGMINE BROMIDE 60 MILLIGRAM(S): 60 SOLUTION ORAL at 05:52

## 2020-03-27 RX ADMIN — MEROPENEM 100 MILLIGRAM(S): 1 INJECTION INTRAVENOUS at 17:46

## 2020-03-27 NOTE — PROGRESS NOTE ADULT - ASSESSMENT
63 yo man with PMH of MG presents from PMD office for syncopal episode   Acute diverticulitis with abscess. Second episode.    PNA r/o aspiration   CT abd with Acute diverticulitis of the proximal sigmoid colon with suspected intramural abscess measuring 2.8 x 2.0 x 1.7 cm.. Bilateral airspace opacities in the lung bases, right greater than left, suspicious for pneumonia. Findings are somewhat atypical for COVID, however, correlation with laboratory testing is recommended.     COVID 19 NEG   no pain or nausea on exam   s/p zosyn and zithromax   switched to meropenem renally dose   pt is in RA, breathing ok , except fever  fu cultures   repeat blood cultures neg   cc of nausea on exam, no vomitting or abd pain   consider IR aspiration of abscess   advance diet   may need iv latia or Invanz for about 2 weeks upon discharge if no indication for Aspiration or Sx.   midline   case discussed with Dr Arias.

## 2020-03-27 NOTE — PROGRESS NOTE ADULT - SUBJECTIVE AND OBJECTIVE BOX
Patient is a 64y old  Male who presents with a chief complaint of syncope (27 Mar 2020 12:27)      HPI:  65yo male with pmh of MG presents from PMD office for syncopal episode today. Complains of worsening nausea with lack of appetite and subjective fever/chills x 1 week. States that he was diagnosed with diverticulitis 2 weeks ago and put on abx for it. Went to PMD for worsening symptoms today, felt very dizzy and fainted. States that PMD said he did not hit his head. Denies abdominal pain, vomiting, diarrhea, bloody in stool/black stool, chest pain, palpitations, sob, urinary incontinence and other assocaited sx. (24 Mar 2020 19:35)      INTERVAL HPI/OVERNIGHT EVENTS:  Patient seen earlier today  Patient had episode of dizziness followed by nausea yesterday afternoon. Tolerated solid diet last night w/o any N/V or diarrhea. Feels fine now    MEDICATIONS  (STANDING):  azithromycin   Tablet 500 milliGRAM(s) Oral every 24 hours  meropenem  IVPB 1000 milliGRAM(s) IV Intermittent every 12 hours  pyridostigmine 60 milliGRAM(s) Oral three times a day  sodium chloride 0.9%. 1000 milliLiter(s) (75 mL/Hr) IV Continuous <Continuous>    MEDICATIONS  (PRN):  acetaminophen   Tablet .. 650 milliGRAM(s) Oral every 6 hours PRN Temp greater or equal to 38C (100.4F), Mild Pain (1 - 3)  ondansetron Injectable 4 milliGRAM(s) IV Push every 6 hours PRN Nausea and/or Vomiting      FAMILY HISTORY:      Allergies    No Known Allergies    Intolerances        PMH/PSH:  Eye muscle weakness, right  High cholesterol  Kidney stones  History of appendectomy        REVIEW OF SYSTEMS:  CONSTITUTIONAL: No fever, weight loss, or fatigue  EYES: No eye pain, visual disturbances, or discharge  ENMT:  No difficulty hearing, tinnitus, vertigo; No sinus or throat pain  NECK: No pain or stiffness  BREASTS: No pain, masses, or nipple discharge  RESPIRATORY: No cough, wheezing, chills or hemoptysis; No shortness of breath  CARDIOVASCULAR: No chest pain, palpitations, dizziness, or leg swelling  GASTROINTESTINAL:  See above  GENITOURINARY: No dysuria, frequency, hematuria, or incontinence  NEUROLOGICAL: No headaches, memory loss, loss of strength, numbness, or tremors  SKIN: No itching, burning, rashes, or lesions   LYMPH NODES: No enlarged glands  ENDOCRINE: No heat or cold intolerance; No hair loss  MUSCULOSKELETAL: No joint pain or swelling; No muscle, back, or extremity pain  PSYCHIATRIC: No depression, anxiety, mood swings, or difficulty sleeping  HEME/LYMPH: No easy bruising, or bleeding gums  ALLERGY AND IMMUNOLOGIC: No hives or eczema    Vital Signs Last 24 Hrs  T(C): 37 (27 Mar 2020 10:20), Max: 37.6 (27 Mar 2020 01:35)  T(F): 98.6 (27 Mar 2020 10:20), Max: 99.6 (27 Mar 2020 01:35)  HR: 69 (27 Mar 2020 10:20) (60 - 99)  BP: 144/73 (27 Mar 2020 10:20) (126/69 - 144/79)  BP(mean): --  RR: 28 (27 Mar 2020 10:20) (17 - 28)  SpO2: 97% (27 Mar 2020 10:20) (91% - 98%)    PHYSICAL EXAM:  GENERAL: NAD, well-groomed, well-developed  HEAD:  Atraumatic, Normocephalic  EYES: EOMI, PERRLA, conjunctiva and sclera clear  NECK: Supple, No JVD, Normal thyroid  NERVOUS SYSTEM:  Alert & Oriented X3, Good concentration; Motor Strength 5/5 B/L upper and lower extremities;   CHEST/LUNG: Clear to percussion bilaterally; No rales, rhonchi, wheezing, or rubs  HEART: Regular rate and rhythm; No murmurs, rubs, or gallops  ABDOMEN: Soft, Nontender, Nondistended; Bowel sounds present  EXTREMITIES:  2+ Peripheral Pulses, No clubbing, cyanosis, or edema  LYMPH: No lymphadenopathy noted  SKIN: No rashes or lesions    LAB                          12.0   7.23  )-----------( 337      ( 27 Mar 2020 10:40 )             36.3       CBC:  03-27 @ 10:40  WBC 7.23   Hgb 12.0   Hct 36.3   Plts 337  MCV 84.4  03-25 @ 07:22  WBC 9.01   Hgb 12.9   Hct 39.9   Plts 279  MCV 84.7  03-24 @ 12:48  WBC 7.64   Hgb 15.3   Hct 45.5   Plts 283  MCV 83.2      Chemistry:  03-27 @ 10:40  Na+ 138  K+ 3.7  Cl- 106  CO2 26  BUN 22  Cr 2.37     03-25 @ 07:22  Na+ 134  K+ 3.5  Cl- 103  CO2 24  BUN 18  Cr 2.04     03-24 @ 12:48  Na+ 134  K+ 3.8  Cl- 101  CO2 28  BUN 15  Cr 1.68         Glucose, Serum: 113 mg/dL (03-27 @ 10:40)  Glucose, Serum: 100 mg/dL (03-25 @ 07:22)  Glucose, Serum: 100 mg/dL (03-24 @ 12:48)      27 Mar 2020 10:40    138    |  106    |  22     ----------------------------<  113    3.7     |  26     |  2.37   25 Mar 2020 07:22    134    |  103    |  18     ----------------------------<  100    3.5     |  24     |  2.04   24 Mar 2020 12:48    134    |  101    |  15     ----------------------------<  100    3.8     |  28     |  1.68     Ca    7.8        27 Mar 2020 10:40  Ca    7.8        25 Mar 2020 07:22  Ca    8.0        24 Mar 2020 12:48  Mg     2.1       24 Mar 2020 12:48    TPro  6.9    /  Alb  2.4    /  TBili  0.8    /  DBili  x      /  AST  74     /  ALT  47     /  AlkPhos  52     24 Mar 2020 12:48              CAPILLARY BLOOD GLUCOSE          C-Reactive Protein, Serum: 9.27 mg/dL (03-25 @ 14:03)      RADIOLOGY & ADDITIONAL TESTS:    Imaging Personally Reviewed:  [ ] YES  [ ] NO    Consultant(s) Notes Reviewed:  [ ] YES  [ ] NO    Care Discussed with Consultants/Other Providers [ ] YES  [ ] NO

## 2020-03-27 NOTE — PROGRESS NOTE ADULT - SUBJECTIVE AND OBJECTIVE BOX
HPI:  65yo male with pmh of MG presents from PMD office for syncopal episode today. Complains of worsening nausea with lack of appetite and subjective fever/chills x 1 week. States that he was diagnosed with diverticulitis 2 weeks ago and put on abx for it. Went to PMD for worsening symptoms today, felt very dizzy and fainted. States that PMD said he did not hit his head. Denies abdominal pain, vomiting, diarrhea, bloody in stool/black stool, chest pain, palpitations, sob, urinary incontinence and other assocaited sx. (24 Mar 2020 19:35)      Allergies    No Known Allergies    Intolerances        MEDICATIONS  (STANDING):  azithromycin   Tablet 500 milliGRAM(s) Oral every 24 hours  meropenem  IVPB 1000 milliGRAM(s) IV Intermittent every 12 hours  pyridostigmine 60 milliGRAM(s) Oral three times a day  sodium chloride 0.9%. 1000 milliLiter(s) (75 mL/Hr) IV Continuous <Continuous>    MEDICATIONS  (PRN):  acetaminophen   Tablet .. 650 milliGRAM(s) Oral every 6 hours PRN Temp greater or equal to 38C (100.4F), Mild Pain (1 - 3)  ondansetron Injectable 4 milliGRAM(s) IV Push every 6 hours PRN Nausea and/or Vomiting      REVIEW OF SYSTEMS:    CONSTITUTIONAL: No fever  RESPIRATORY: No cough  CARDIOVASCULAR: No chest pain  GASTROINTESTINAL: No abdominal pain. No nausea, vomiting, diarrhea  GENITOURINARY: No dysuria, increase frequency, hematuria, or incontinence  NEUROLOGICAL: No headaches  EXTREMITY: No pedal edema BLE  SKIN: No itching, burning, rashes, or lesions     VITAL SIGNS:  T(C): 37 (03-27-20 @ 10:20), Max: 37.6 (03-27-20 @ 01:35)  T(F): 98.6 (03-27-20 @ 10:20), Max: 99.6 (03-27-20 @ 01:35)  HR: 69 (03-27-20 @ 10:20) (60 - 99)  BP: 144/73 (03-27-20 @ 10:20) (126/69 - 144/79)  RR: 28 (03-27-20 @ 10:20) (17 - 28)  SpO2: 97% (03-27-20 @ 10:20) (91% - 98%)  Wt(kg): --    PHYSICAL EXAM:    GENERAL: not in any distress  HEENT: Neck is supple, normocephalic, atraumatic   CHEST/LUNG: Clear to percussion bilaterally; No rales, rhonchi, wheezing  HEART: Regular rate and rhythm; No murmurs, rubs, or gallops  ABDOMEN: Soft, Nontender, Nondistended; Bowel sounds present, no rebound   EXTREMITIES:  2+ Peripheral Pulses, No clubbing, cyanosis, or edema  GENITOURINARY:   SKIN: No rashes or lesions  BACK: no pressor sore   NERVOUS SYSTEM:  Alert & Oriented     LABS:                         12.0   7.23  )-----------( 337      ( 27 Mar 2020 10:40 )             36.3     03-27    138  |  106  |  22  ----------------------------<  113<H>  3.7   |  26  |  2.37<H>    Ca    7.8<L>      27 Mar 2020 10:40                                Culture Results:   No growth to date. (03-24 @ 18:21)  Culture Results:   No growth to date. (03-24 @ 18:21)                Radiology:

## 2020-03-27 NOTE — CONSULT NOTE ADULT - ASSESSMENT
Recurrent diverticulitis    pt currently asymptomatic, abd exam benign - no need for urgent surgical intervention at this time  cont IV abx  once pt afebrile for 24 hrs can d/c home on abx per ID  f/u with me as an outpatient  will plan for colonoscopy in 6-8 weeks and discuss elective resection

## 2020-03-27 NOTE — CONSULT NOTE ADULT - SUBJECTIVE AND OBJECTIVE BOX
The patient has a h/o diverticulitis in the past, most recently in oct/nov 2019 that was successfully treated with abx. He then developed another episode recently for which he was placed on outpt abx. He then presented to the Er with abd pain and was found to have persistent diverticulitis with fevers and an intramural abscess. Last colonoscopy 4-5 years ago.   He now has no abd pain, only mild nausea and fatigue. COVID negative      PAST MEDICAL & SURGICAL HISTORY:  Eye muscle weakness, right  High cholesterol  Kidney stones  History of appendectomy      REVIEW OF SYSTEMS    General:	Negative  Skin/Breast: Negative	  Ophthalmologic: Negative  ENMT: Negative  Respiratory and Thorax: Negative  Cardiovascular: Negative  Gastrointestinal: Negative  Genitourinary: Negative  Musculoskeletal: Negative  Neurological: Negative  Psychiatric: Negative  Hematology/Lymphatics: Negative  Endocrine:	Negative  Allergic/Immunologic:	 Negative      MEDICATIONS  (STANDING):  azithromycin   Tablet 500 milliGRAM(s) Oral every 24 hours  meropenem  IVPB 1000 milliGRAM(s) IV Intermittent every 12 hours  pyridostigmine 60 milliGRAM(s) Oral three times a day  sodium chloride 0.9%. 1000 milliLiter(s) (75 mL/Hr) IV Continuous <Continuous>    MEDICATIONS  (PRN):  acetaminophen   Tablet .. 650 milliGRAM(s) Oral every 6 hours PRN Temp greater or equal to 38C (100.4F), Mild Pain (1 - 3)  ondansetron Injectable 4 milliGRAM(s) IV Push every 6 hours PRN Nausea and/or Vomiting      Allergies    No Known Allergies    Intolerances        SOCIAL HISTORY:  Alcohol: Denied  Smoking: Nonsmoker  Drug Use: Denied  Marital Status:         FAMILY HISTORY:      Vital Signs Last 24 Hrs  T(C): 36.9 (28 Mar 2020 06:00), Max: 37.3 (28 Mar 2020 01:10)  T(F): 98.4 (28 Mar 2020 06:00), Max: 99.2 (28 Mar 2020 01:10)  HR: 63 (28 Mar 2020 06:00) (60 - 64)  BP: 152/89 (28 Mar 2020 06:00) (152/89 - 154/76)  BP(mean): --  RR: 18 (28 Mar 2020 06:00) (18 - 18)  SpO2: 97% (28 Mar 2020 06:00) (97% - 97%)    PHYSICAL EXAM:  Constitutional: well developed, well nourished, NAD  Eyes: anicteric  ENMT: normal facies, symmetric  Neck: supple  Respiratory: CTA bilat  Cardiovascular: RRR  Gastrointestinal: abdomen soft, nontender, nondistended. No obvious masses. No peritonitis  Extremities: FROM, warm  Neurological: intact, non-focal  Skin: no gross lesions  Lymph Nodes: no gross adenopathy  Musculoskeletal: equal strength bilateral upper and lower extremities  Psychiatric: oriented x 3; appropriate          LABS:                        12.0   7.23  )-----------( 337      ( 27 Mar 2020 10:40 )             36.3     03-27    138  |  106  |  22  ----------------------------<  113<H>  3.7   |  26  |  2.37<H>    Ca    7.8<L>      27 Mar 2020 10:40            RADIOLOGY & ADDITIONAL STUDIES:  CT scan with sigmoid diverticulitis with small intramural abscess, no free air or free fluid, no extra colonic abscess

## 2020-03-27 NOTE — PROGRESS NOTE ADULT - SUBJECTIVE AND OBJECTIVE BOX
Patient is a 64y old  Male who presents with a chief complaint of syncope (26 Mar 2020 17:42)    HPI:  63yo male with pmh of MG presents from PMD office for syncopal episode today. Complains of worsening nausea with lack of appetite and subjective fever/chills x 1 week. States that he was diagnosed with diverticulitis 2 weeks ago and put on abx for it. Went to PMD for worsening symptoms today, felt very dizzy and fainted. States that PMD said he did not hit his head. Denies abdominal pain, vomiting, diarrhea, bloody in stool/black stool, chest pain, palpitations, sob, urinary incontinence and other assocaited sx. (24 Mar 2020 19:35)    SUBJECTIVE & OBJECTIVE: Pt seen and examined at bedside.   PHYSICAL EXAM:      T(C): 37.2 (03-27-20 @ 17:30), Max: 37.2 (03-27-20 @ 17:30)  HR: 60 (03-27-20 @ 17:30) (60 - 69)  BP: 153/91 (03-27-20 @ 17:30) (144/73 - 153/91)  RR: 18 (03-27-20 @ 17:30) (18 - 28)  SpO2: 97% (03-27-20 @ 17:30) (97% - 97%)      MEDICATIONS  (STANDING):  azithromycin   Tablet 500 milliGRAM(s) Oral every 24 hours  meropenem  IVPB 1000 milliGRAM(s) IV Intermittent every 12 hours  pyridostigmine 60 milliGRAM(s) Oral three times a day  sodium chloride 0.9%. 1000 milliLiter(s) (75 mL/Hr) IV Continuous <Continuous>    MEDICATIONS  (PRN):  acetaminophen   Tablet .. 650 milliGRAM(s) Oral every 6 hours PRN Temp greater or equal to 38C (100.4F), Mild Pain (1 - 3)  ondansetron Injectable 4 milliGRAM(s) IV Push every 6 hours PRN Nausea and/or Vomiting  \  OUT:  Total OUT: 0 mL        GENERAL: NAD, well-groomed, well-developed  HEAD:  Atraumatic, Normocephalic  EYES: EOMI, conjunctiva and sclera clear  ENMT: Moist mucous membranes  NECK: Supple, No JVD  NERVOUS SYSTEM:  Alert & Oriented X3, Motor Strength 5/5 B/L upper and lower extremities; DTRs 2+ intact and symmetric  CHEST/LUNG: Clear to auscultation bilaterally; No rales, rhonchi, wheezing, or rubs  HEART: Regular rate and rhythm; No murmurs, rubs, or gallops  ABDOMEN: Soft, Nontender, Nondistended; Bowel sounds present  EXTREMITIES:  2+ Peripheral Pulses, No clubbing, cyanosis, or edema    MEDICATIONS  (STANDING):  azithromycin   Tablet 500 milliGRAM(s) Oral every 24 hours  meropenem  IVPB 1000 milliGRAM(s) IV Intermittent every 12 hours  pyridostigmine 60 milliGRAM(s) Oral three times a day  sodium chloride 0.9%. 1000 milliLiter(s) (75 mL/Hr) IV Continuous <Continuous>    MEDICATIONS  (PRN):  acetaminophen   Tablet .. 650 milliGRAM(s) Oral every 6 hours PRN Temp greater or equal to 38C (100.4F), Mild Pain (1 - 3)  ondansetron Injectable 4 milliGRAM(s) IV Push every 6 hours PRN Nausea and/or Vomiting                            12.0   7.23  )-----------( 337      ( 27 Mar 2020 10:40 )             36.3               138|106|22<113  3.7|26|2.37  7.8,--,--  03-27 @ 10:40    CAPILLARY BLOOD GLUCOSE                RECENT CULTURES:  03-24 @ 18:21   No growth to date.  --  --    Urine culture:  03-24 @ 18:21 --   No growth to date.      RADIOLOGY & ADDITIONAL TESTS:        DVT/GI ppx  Discussed with pt @ bedside

## 2020-03-27 NOTE — PROGRESS NOTE ADULT - ASSESSMENT
64 years old male with history of recently treated diverticultiis presents with syncope     Problem/Plan - 1:  ·  Problem: Diverticulitis.  Plan: Merrem  - also has intramural abscess on CT  - evaluated by Surgery and images reviewed by IR, no role for surgical or IR drainage.  Recommend IV abx and Colorectal Eval with Dr. Ibrahim on Friday for ?elective colon resection.   - Spoke to Dr. Ibrahim recommend out patient follow up.   - GI eval appreciated     Problem/Plan - 2:  ·  Problem: CAP (community acquired pneumonia).  Plan: On Zithromax  infectious disease consult- artis  COVID and RVP is negative     Problem/Plan - 3:  ·  Problem: High cholesterol.  Plan: dash.     Problem/Plan - 4:  ·  Problem: Syncope.  Plan: has had recurrent Syncope  - check orthostatics  - check venous dopplers   echocardiagram reviewed.        Problem/Plan - 5:  ·  Problem: BARBARA   - would rule out post obstructive uropathy   - bladder scan Q6  and straight cath for residual > 125 ml

## 2020-03-27 NOTE — PROGRESS NOTE ADULT - ASSESSMENT
HPI:  63yo male with pmh of MG presents from PMD office for syncopal episode today. Complains of worsening nausea with lack of appetite and subjective fever/chills x 1 week. States that he was diagnosed with diverticulitis 2 weeks ago and put on abx for it. Went to PMD for worsening symptoms today, felt very dizzy and fainted. States that PMD said he did not hit his head. Denies abdominal pain, vomiting, diarrhea, bloody in stool/black stool, chest pain, palpitations, sob, urinary incontinence and other assocaited sx. (24 Mar 2020 19:35)  ------------------------- As Above ------------------------------- Patient seen earlier today  The patient states he was hospitalized for two days with a diagnosis of diverticulitis last week. He was discharged with two antibiotics. He finished one but the second one was not completed because it made him nauseous. He never did have a recurrence of abdominal pain but was very nauseous.   Patient presented with a syncopal episode.   Also had an episode of diverticulitis last October  Still feels nauseous today Tolerated clear liquids today. Had a normal colonoscopy 4 years ago.    Diverticulitis with abscess. ? Vertigo.  Second episode. On Merrem. CRP 9.27  WBC 7.23 Tolerated regular diet 1) f/u labs  2) surgical f/u 3) old records

## 2020-03-28 PROCEDURE — 76937 US GUIDE VASCULAR ACCESS: CPT | Mod: 26,59

## 2020-03-28 PROCEDURE — 99232 SBSQ HOSP IP/OBS MODERATE 35: CPT

## 2020-03-28 PROCEDURE — 36569 INSJ PICC 5 YR+ W/O IMAGING: CPT

## 2020-03-28 RX ADMIN — PYRIDOSTIGMINE BROMIDE 60 MILLIGRAM(S): 60 SOLUTION ORAL at 21:16

## 2020-03-28 RX ADMIN — PYRIDOSTIGMINE BROMIDE 60 MILLIGRAM(S): 60 SOLUTION ORAL at 16:05

## 2020-03-28 RX ADMIN — PYRIDOSTIGMINE BROMIDE 60 MILLIGRAM(S): 60 SOLUTION ORAL at 05:19

## 2020-03-28 RX ADMIN — AZITHROMYCIN 500 MILLIGRAM(S): 500 TABLET, FILM COATED ORAL at 21:15

## 2020-03-28 RX ADMIN — SODIUM CHLORIDE 75 MILLILITER(S): 9 INJECTION INTRAMUSCULAR; INTRAVENOUS; SUBCUTANEOUS at 23:26

## 2020-03-28 RX ADMIN — MEROPENEM 100 MILLIGRAM(S): 1 INJECTION INTRAVENOUS at 17:23

## 2020-03-28 RX ADMIN — MEROPENEM 100 MILLIGRAM(S): 1 INJECTION INTRAVENOUS at 05:18

## 2020-03-28 NOTE — PROCEDURE NOTE - ADDITIONAL PROCEDURE DETAILS
Patient seen at bedside for midline catheter placement.  Consent obtained from patient after risks/benefits/alternatives explained.   Upper extremity prepped and draped in usual sterile fashion. Time out performed with RN. 4Fr 20 cm single lumen midline catheter placed using ultrasound guided Seldinger technique, L basilic vein. Flushes well, with good venous return. Patient tolerated procedure well without complication and was left in no acute distress. Upper arm circumference noted to be

## 2020-03-28 NOTE — PROGRESS NOTE ADULT - SUBJECTIVE AND OBJECTIVE BOX
Patient is a 64y old  Male who presents with a chief complaint of syncope (26 Mar 2020 17:42)    HPI:  65yo male with pmh of MG presents from PMD office for syncopal episode today. Complains of worsening nausea with lack of appetite and subjective fever/chills x 1 week. States that he was diagnosed with diverticulitis 2 weeks ago and put on abx for it. Went to PMD for worsening symptoms today, felt very dizzy and fainted. States that PMD said he did not hit his head. Denies abdominal pain, vomiting, diarrhea, bloody in stool/black stool, chest pain, palpitations, sob, urinary incontinence and other assocaited sx. (24 Mar 2020 19:35)    SUBJECTIVE & OBJECTIVE: Pt seen and examined at bedside.   PHYSICAL EXAM:    T(C): 36.6 (03-28-20 @ 17:45), Max: 36.6 (03-28-20 @ 17:45)  HR: 67 (03-28-20 @ 17:45) (67 - 68)  BP: 156/80 (03-28-20 @ 17:45) (135/69 - 156/80)  RR: 19 (03-28-20 @ 17:45) (19 - 19)  SpO2: 97% (03-28-20 @ 17:45) (96% - 97%)      MEDICATIONS  (STANDING):  azithromycin   Tablet 500 milliGRAM(s) Oral every 24 hours  meropenem  IVPB 1000 milliGRAM(s) IV Intermittent every 12 hours  pyridostigmine 60 milliGRAM(s) Oral three times a day  sodium chloride 0.9%. 1000 milliLiter(s) (75 mL/Hr) IV Continuous <Continuous>    MEDICATIONS  (PRN):  acetaminophen   Tablet .. 650 milliGRAM(s) Oral every 6 hours PRN Temp greater or equal to 38C (100.4F), Mild Pain (1 - 3)  ondansetron Injectable 4 milliGRAM(s) IV Push every 6 hours PRN Nausea and/or Vomiting  OUT:  Total OUT: 0 mL        GENERAL: NAD, well-groomed, well-developed  HEAD:  Atraumatic, Normocephalic  EYES: EOMI, conjunctiva and sclera clear  ENMT: Moist mucous membranes  NECK: Supple, No JVD  NERVOUS SYSTEM:  Alert & Oriented X3, Motor Strength 5/5 B/L upper and lower extremities; DTRs 2+ intact and symmetric  CHEST/LUNG: Clear to auscultation bilaterally; No rales, rhonchi, wheezing, or rubs  HEART: Regular rate and rhythm; No murmurs, rubs, or gallops  ABDOMEN: Soft, Nontender, Nondistended; Bowel sounds present  EXTREMITIES:  2+ Peripheral Pulses, No clubbing, cyanosis, or edema                                  12.0   7.23  )-----------( 337      ( 27 Mar 2020 10:40 )             36.3               40    CAPILLARY BLOOD GLUCOSE                RECENT CULTURES:  03-24 @ 18:21   No growth to date.  --  --    Urine culture:  03-24 @ 18:21 --   No growth to date.      RADIOLOGY & ADDITIONAL TESTS:        DVT/GI ppx  Discussed with pt @ bedside

## 2020-03-28 NOTE — PROGRESS NOTE ADULT - ASSESSMENT
64 years old male with history of recently treated diverticultiis presents with syncope     Problem/Plan - 1:  ·  Problem: Diverticulitis.  Plan: Merrem  - also has intramural abscess on CT  - evaluated by Surgery and images reviewed by IR, no role for surgical or IR drainage.  Recommend IV abx and Colorectal Eval with Dr. Ibrahim on Friday for ?elective colon resection.   - Spoke to Dr. Ibrahim recommend out patient follow up.   - GI eval appreciated   - MID line insertion d/c planning on iv Invanz.     ·  Problem: CAP (community acquired pneumonia).  Plan: On Zithromax  infectious disease consult- artis  COVID and RVP is negative     Problem/Plan - 3:  ·  Problem: High cholesterol.  Plan: dash.     Problem/Plan - 4:  ·  Problem: Syncope.  Plan: has had recurrent Syncope  - check orthostatics  - check venous dopplers   echocardiagram reviewed.        Problem/Plan - 5:  ·  Problem: BARBARA   - would rule out post obstructive uropathy   - bladder scan Q6  and straight cath for residual > 125 ml

## 2020-03-29 LAB
CULTURE RESULTS: SIGNIFICANT CHANGE UP
CULTURE RESULTS: SIGNIFICANT CHANGE UP
SPECIMEN SOURCE: SIGNIFICANT CHANGE UP
SPECIMEN SOURCE: SIGNIFICANT CHANGE UP

## 2020-03-29 PROCEDURE — 99232 SBSQ HOSP IP/OBS MODERATE 35: CPT

## 2020-03-29 RX ADMIN — PYRIDOSTIGMINE BROMIDE 60 MILLIGRAM(S): 60 SOLUTION ORAL at 16:32

## 2020-03-29 RX ADMIN — MEROPENEM 100 MILLIGRAM(S): 1 INJECTION INTRAVENOUS at 17:42

## 2020-03-29 RX ADMIN — MEROPENEM 100 MILLIGRAM(S): 1 INJECTION INTRAVENOUS at 05:07

## 2020-03-29 RX ADMIN — PYRIDOSTIGMINE BROMIDE 60 MILLIGRAM(S): 60 SOLUTION ORAL at 05:07

## 2020-03-29 RX ADMIN — PYRIDOSTIGMINE BROMIDE 60 MILLIGRAM(S): 60 SOLUTION ORAL at 21:29

## 2020-03-29 NOTE — PROGRESS NOTE ADULT - ASSESSMENT
64 years old male with history of recently treated diverticultiis presents with syncope     Problem/Plan - 1:  ·  Problem: Diverticulitis.  Plan: Merrem  - also has intramural abscess on CT  - evaluated by Surgery and images reviewed by IR, no role for surgical or IR drainage.  Recommend IV abx and Colorectal Eval with Dr. Ibrahim on Friday for ?elective colon resection.   - Spoke to Dr. Ibrahim recommend out patient follow up.   - GI eval appreciated   - MID line insertion d/c planning on iv Invanz.     ·  Problem: CAP (community acquired pneumonia).  Plan: On Zithromax  infectious disease consult- artis  COVID and RVP is negative     Problem/Plan - 3:  ·  Problem: High cholesterol.  Plan: dash.     Problem/Plan - 4:  ·  Problem: Syncope.  Plan: has had recurrent Syncope likely in the setting of infection   Echocardiogram reviewed.        Problem/Plan - 5:  ·  Problem: BARBARA   - Likely Post contrast Nephropathy- follow up renal sono.   -Urine for Eosinophils

## 2020-03-29 NOTE — PROGRESS NOTE ADULT - SUBJECTIVE AND OBJECTIVE BOX
Patient is a 64y old  Male who presents with a chief complaint of syncope (26 Mar 2020 17:42)    HPI:  63yo male with pmh of MG presents from PMD office for syncopal episode today. Complains of worsening nausea with lack of appetite and subjective fever/chills x 1 week. States that he was diagnosed with diverticulitis 2 weeks ago and put on abx for it. Went to PMD for worsening symptoms today, felt very dizzy and fainted. States that PMD said he did not hit his head. Denies abdominal pain, vomiting, diarrhea, bloody in stool/black stool, chest pain, palpitations, sob, urinary incontinence and other assocaited sx. (24 Mar 2020 19:35)    SUBJECTIVE & OBJECTIVE: Pt seen and examined at bedside.   PHYSICAL EXAM:    T(C): 36.6 (03-28-20 @ 17:45), Max: 36.6 (03-28-20 @ 17:45)  HR: 67 (03-28-20 @ 17:45) (67 - 68)  BP: 156/80 (03-28-20 @ 17:45) (135/69 - 156/80)  RR: 19 (03-28-20 @ 17:45) (19 - 19)  SpO2: 97% (03-28-20 @ 17:45) (96% - 97%)      MEDICATIONS  (STANDING):  azithromycin   Tablet 500 milliGRAM(s) Oral every 24 hours  meropenem  IVPB 1000 milliGRAM(s) IV Intermittent every 12 hours  pyridostigmine 60 milliGRAM(s) Oral three times a day  sodium chloride 0.9%. 1000 milliLiter(s) (75 mL/Hr) IV Continuous <Continuous>    MEDICATIONS  (PRN):  acetaminophen   Tablet .. 650 milliGRAM(s) Oral every 6 hours PRN Temp greater or equal to 38C (100.4F), Mild Pain (1 - 3)  ondansetron Injectable 4 milliGRAM(s) IV Push every 6 hours PRN Nausea and/or Vomiting  OUT:  Total OUT: 0 mL        GENERAL: NAD, well-groomed, well-developed  HEAD:  Atraumatic, Normocephalic  EYES: EOMI, conjunctiva and sclera clear  ENMT: Moist mucous membranes  NECK: Supple, No JVD  NERVOUS SYSTEM:  Alert & Oriented X3, Motor Strength 5/5 B/L upper and lower extremities; DTRs 2+ intact and symmetric  CHEST/LUNG: Clear to auscultation bilaterally; No rales, rhonchi, wheezing, or rubs  HEART: Regular rate and rhythm; No murmurs, rubs, or gallops  ABDOMEN: Soft, Nontender, Nondistended; Bowel sounds present  EXTREMITIES:  2+ Peripheral Pulses, No clubbing, cyanosis, or edema                               CAPILLARY BLOOD GLUCOSE              RECENT CULTURES:  03-24 @ 18:21   No growth to date.  --  --    Urine culture:  03-24 @ 18:21 --   No growth to date.      RADIOLOGY & ADDITIONAL TESTS:        DVT/GI ppx  Discussed with pt @ bedside

## 2020-03-30 LAB
ANION GAP SERPL CALC-SCNC: 4 MMOL/L — LOW (ref 5–17)
BUN SERPL-MCNC: 17 MG/DL — SIGNIFICANT CHANGE UP (ref 7–23)
CALCIUM SERPL-MCNC: 7.9 MG/DL — LOW (ref 8.5–10.1)
CHLORIDE SERPL-SCNC: 111 MMOL/L — HIGH (ref 96–108)
CO2 SERPL-SCNC: 26 MMOL/L — SIGNIFICANT CHANGE UP (ref 22–31)
CREAT SERPL-MCNC: 1.81 MG/DL — HIGH (ref 0.5–1.3)
CRP SERPL-MCNC: 3.66 MG/DL — HIGH (ref 0–0.4)
GLUCOSE SERPL-MCNC: 105 MG/DL — HIGH (ref 70–99)
HCT VFR BLD CALC: 36.4 % — LOW (ref 39–50)
HGB BLD-MCNC: 11.8 G/DL — LOW (ref 13–17)
MCHC RBC-ENTMCNC: 27.5 PG — SIGNIFICANT CHANGE UP (ref 27–34)
MCHC RBC-ENTMCNC: 32.4 GM/DL — SIGNIFICANT CHANGE UP (ref 32–36)
MCV RBC AUTO: 84.8 FL — SIGNIFICANT CHANGE UP (ref 80–100)
NRBC # BLD: 0 /100 WBCS — SIGNIFICANT CHANGE UP (ref 0–0)
PLATELET # BLD AUTO: 339 K/UL — SIGNIFICANT CHANGE UP (ref 150–400)
POTASSIUM SERPL-MCNC: 3.9 MMOL/L — SIGNIFICANT CHANGE UP (ref 3.5–5.3)
POTASSIUM SERPL-SCNC: 3.9 MMOL/L — SIGNIFICANT CHANGE UP (ref 3.5–5.3)
RBC # BLD: 4.29 M/UL — SIGNIFICANT CHANGE UP (ref 4.2–5.8)
RBC # FLD: 13.4 % — SIGNIFICANT CHANGE UP (ref 10.3–14.5)
SODIUM SERPL-SCNC: 141 MMOL/L — SIGNIFICANT CHANGE UP (ref 135–145)
WBC # BLD: 8.7 K/UL — SIGNIFICANT CHANGE UP (ref 3.8–10.5)
WBC # FLD AUTO: 8.7 K/UL — SIGNIFICANT CHANGE UP (ref 3.8–10.5)

## 2020-03-30 PROCEDURE — 99232 SBSQ HOSP IP/OBS MODERATE 35: CPT

## 2020-03-30 RX ORDER — ACETAMINOPHEN 500 MG
650 TABLET ORAL EVERY 6 HOURS
Refills: 0 | Status: DISCONTINUED | OUTPATIENT
Start: 2020-03-30 | End: 2020-04-02

## 2020-03-30 RX ADMIN — Medication 650 MILLIGRAM(S): at 14:51

## 2020-03-30 RX ADMIN — PYRIDOSTIGMINE BROMIDE 60 MILLIGRAM(S): 60 SOLUTION ORAL at 14:52

## 2020-03-30 RX ADMIN — Medication 650 MILLIGRAM(S): at 21:19

## 2020-03-30 RX ADMIN — Medication 650 MILLIGRAM(S): at 06:30

## 2020-03-30 RX ADMIN — MEROPENEM 100 MILLIGRAM(S): 1 INJECTION INTRAVENOUS at 16:41

## 2020-03-30 RX ADMIN — PYRIDOSTIGMINE BROMIDE 60 MILLIGRAM(S): 60 SOLUTION ORAL at 06:18

## 2020-03-30 RX ADMIN — Medication 650 MILLIGRAM(S): at 07:11

## 2020-03-30 RX ADMIN — PYRIDOSTIGMINE BROMIDE 60 MILLIGRAM(S): 60 SOLUTION ORAL at 21:19

## 2020-03-30 RX ADMIN — MEROPENEM 100 MILLIGRAM(S): 1 INJECTION INTRAVENOUS at 06:18

## 2020-03-30 NOTE — PROGRESS NOTE ADULT - SUBJECTIVE AND OBJECTIVE BOX
HPI:  63yo male with pmh of MG presents from PMD office for syncopal episode today. Complains of worsening nausea with lack of appetite and subjective fever/chills x 1 week. States that he was diagnosed with diverticulitis 2 weeks ago and put on abx for it. Went to PMD for worsening symptoms today, felt very dizzy and fainted. States that PMD said he did not hit his head. Denies abdominal pain, vomiting, diarrhea, bloody in stool/black stool, chest pain, palpitations, sob, urinary incontinence and other assocaited sx. (24 Mar 2020 19:35)    Patient is a 64y old  Male who presents with a chief complaint of syncope (30 Mar 2020 12:33)      INTERVAL HPI/OVERNIGHT EVENTS:   Kathe cute events overnight complains of some left sided  abdominal rib pain     MEDICATIONS  (STANDING):  meropenem  IVPB 1000 milliGRAM(s) IV Intermittent every 12 hours  pyridostigmine 60 milliGRAM(s) Oral three times a day    MEDICATIONS  (PRN):  acetaminophen   Tablet .. 650 milliGRAM(s) Oral every 6 hours PRN Temp greater or equal to 38C (100.4F), Mild Pain (1 - 3)  ondansetron Injectable 4 milliGRAM(s) IV Push every 6 hours PRN Nausea and/or Vomiting      Allergies    No Known Allergies    Intolerances        REVIEW OF SYSTEMS:  CONSTITUTIONAL: No fever, weight loss, or fatigue  EYES: No eye pain, visual disturbances, or discharge  ENMT:  No difficulty hearing, tinnitus, vertigo; No sinus or throat pain  NECK: No pain or stiffness  BREASTS: No pain, masses, or nipple discharge  RESPIRATORY: No cough, wheezing, chills or hemoptysis; No shortness of breath  CARDIOVASCULAR: No chest pain, palpitations, dizziness, or leg swelling  GASTROINTESTINAL: Abdominal pain   GENITOURINARY: No dysuria, frequency, hematuria, or incontinence  NEUROLOGICAL: No headaches, memory loss, loss of strength, numbness, or tremors  SKIN: No itching, burning, rashes, or lesions   LYMPH NODES: No enlarged glands  ENDOCRINE: No heat or cold intolerance; No hair loss  MUSCULOSKELETAL: No joint pain or swelling; No muscle, back, or extremity pain  PSYCHIATRIC: No depression, anxiety, mood swings, or difficulty sleeping  HEME/LYMPH: No easy bruising, or bleeding gums  ALLERGY AND IMMUNOLOGIC: No hives or eczema    Vital Signs Last 24 Hrs  T(C): 36.4 (30 Mar 2020 12:00), Max: 37.5 (30 Mar 2020 00:35)  T(F): 97.6 (30 Mar 2020 12:00), Max: 99.5 (30 Mar 2020 00:35)  HR: 72 (30 Mar 2020 12:00) (69 - 83)  BP: 160/84 (30 Mar 2020 12:00) (107/87 - 160/84)  BP(mean): --  RR: 17 (30 Mar 2020 12:00) (17 - 18)  SpO2: 92% (30 Mar 2020 12:00) (85% - 93%)    PHYSICAL EXAM:  GENERAL: NAD, well-groomed, well-developed  HEAD:  Atraumatic, Normocephalic  EYES: EOMI, PERRLA, conjunctiva and sclera clear  ENMT: No tonsillar erythema, exudates, or enlargement; Moist mucous membranes, Good dentition, No lesions  NECK: Supple, No JVD, Normal thyroid  NERVOUS SYSTEM:  Alert & Oriented X3, Good concentration; Motor Strength 5/5 B/L upper and lower extremities; DTRs 2+ intact and symmetric  CHEST/LUNG: Clear to percussion bilaterally; No rales, rhonchi, wheezing, or rubs  HEART: Regular rate and rhythm; No murmurs, rubs, or gallops  ABDOMEN: Soft, Nontender, Nondistended; Bowel sounds present  EXTREMITIES:  2+ Peripheral Pulses, No clubbing, cyanosis, or edema  LYMPH: No lymphadenopathy noted  SKIN: No rashes or lesions    LABS:                        11.8   8.70  )-----------( 339      ( 30 Mar 2020 09:43 )             36.4     03-30    141  |  111<H>  |  17  ----------------------------<  105<H>  3.9   |  26  |  1.81<H>    Ca    7.9<L>      30 Mar 2020 06:39          CAPILLARY BLOOD GLUCOSE          RADIOLOGY & ADDITIONAL TESTS:    Imaging Personally Reviewed:  [X ] YES  [ ] NO    Consultant(s) Notes Reviewed:  [ X] YES  [ ] NO    Care Discussed with Consultants/Other Providers [ X] YES  [ ] NO

## 2020-03-30 NOTE — PROGRESS NOTE ADULT - SUBJECTIVE AND OBJECTIVE BOX
Patient is a 64y old  Male who presents with a chief complaint of syncope (29 Mar 2020 17:59)      HPI:  63yo male with pmh of MG presents from PMD office for syncopal episode today. Complains of worsening nausea with lack of appetite and subjective fever/chills x 1 week. States that he was diagnosed with diverticulitis 2 weeks ago and put on abx for it. Went to PMD for worsening symptoms today, felt very dizzy and fainted. States that PMD said he did not hit his head. Denies abdominal pain, vomiting, diarrhea, bloody in stool/black stool, chest pain, palpitations, sob, urinary incontinence and other assocaited sx. (24 Mar 2020 19:35)      INTERVAL HPI/OVERNIGHT EVENTS:  Nausea associated with dizziness. Tolerating regular diet. The patient denies melena, hematochezia, hematemesis, vomiting, abdominal pain, constipation, diarrhea, or change in bowel movements     MEDICATIONS  (STANDING):  meropenem  IVPB 1000 milliGRAM(s) IV Intermittent every 12 hours  pyridostigmine 60 milliGRAM(s) Oral three times a day    MEDICATIONS  (PRN):  acetaminophen   Tablet .. 650 milliGRAM(s) Oral every 6 hours PRN Temp greater or equal to 38C (100.4F), Mild Pain (1 - 3)  ondansetron Injectable 4 milliGRAM(s) IV Push every 6 hours PRN Nausea and/or Vomiting      FAMILY HISTORY:      Allergies    No Known Allergies    Intolerances        PMH/PSH:  Eye muscle weakness, right  High cholesterol  Kidney stones  History of appendectomy        REVIEW OF SYSTEMS:  CONSTITUTIONAL: No fever, weight loss,  EYES: No eye pain, visual disturbances, or discharge  ENMT:  No difficulty hearing, tinnitus, vertigo; No sinus or throat pain  NECK: No pain or stiffness  BREASTS: No pain, masses, or nipple discharge  RESPIRATORY: No cough, wheezing, chills or hemoptysis; No shortness of breath  CARDIOVASCULAR: No chest pain, palpitations, dizziness, or leg swelling  GASTROINTESTINAL: See above.  GENITOURINARY: No dysuria, frequency, hematuria, or incontinence  NEUROLOGICAL: No headaches, memory loss, loss of strength, numbness, or tremors  SKIN: No itching, burning, rashes, or lesions   LYMPH NODES: No enlarged glands  ENDOCRINE: No heat or cold intolerance; No hair loss  MUSCULOSKELETAL: No joint pain or swelling; No muscle, back, or extremity pain  PSYCHIATRIC: No depression, anxiety, mood swings, or difficulty sleeping  HEME/LYMPH: No easy bruising, or bleeding gums  ALLERGY AND IMMUNOLOGIC: No hives or eczema    Vital Signs Last 24 Hrs  T(C): 36.4 (30 Mar 2020 12:00), Max: 37.5 (30 Mar 2020 00:35)  T(F): 97.6 (30 Mar 2020 12:00), Max: 99.5 (30 Mar 2020 00:35)  HR: 72 (30 Mar 2020 12:00) (69 - 83)  BP: 160/84 (30 Mar 2020 12:00) (107/87 - 160/84)  BP(mean): --  RR: 17 (30 Mar 2020 12:00) (17 - 18)  SpO2: 92% (30 Mar 2020 12:00) (85% - 93%)    PHYSICAL EXAM:  GENERAL: NAD, well-groomed, well-developed  HEAD:  Atraumatic, Normocephalic  EYES: EOMI, PERRLA, conjunctiva and sclera clear  ENMT: No tonsillar erythema, exudates, or enlargement; Moist mucous membranes, Good dentition, No lesions  NECK: Supple, No JVD, Normal thyroid  NERVOUS SYSTEM:  Alert & Oriented X3, Good concentration; Motor Strength 5/5 B/L upper and lower extremities; DTRs 2+ intact and symmetric  CHEST/LUNG: Clear to percussion bilaterally; No rales, rhonchi, wheezing, or rubs  HEART: Regular rate and rhythm; No murmurs, rubs, or gallops  ABDOMEN: Soft, Nontender, Nondistended; Bowel sounds present  EXTREMITIES:  2+ Peripheral Pulses, No clubbing, cyanosis, or edema  LYMPH: No lymphadenopathy noted  SKIN: No rashes or lesions    LAB                          11.8   8.70  )-----------( 339      ( 30 Mar 2020 09:43 )             36.4       CBC:  03-30 @ 09:43  WBC 8.70   Hgb 11.8   Hct 36.4   Plts 339  MCV 84.8  03-27 @ 10:40  WBC 7.23   Hgb 12.0   Hct 36.3   Plts 337  MCV 84.4  03-25 @ 07:22  WBC 9.01   Hgb 12.9   Hct 39.9   Plts 279  MCV 84.7  03-24 @ 12:48  WBC 7.64   Hgb 15.3   Hct 45.5   Plts 283  MCV 83.2      Chemistry:  03-30 @ 06:39  Na+ 141  K+ 3.9  Cl- 111  CO2 26  BUN 17  Cr 1.81     03-27 @ 10:40  Na+ 138  K+ 3.7  Cl- 106  CO2 26  BUN 22  Cr 2.37     03-25 @ 07:22  Na+ 134  K+ 3.5  Cl- 103  CO2 24  BUN 18  Cr 2.04     03-24 @ 12:48  Na+ 134  K+ 3.8  Cl- 101  CO2 28  BUN 15  Cr 1.68         Glucose, Serum: 105 mg/dL (03-30 @ 06:39)  Glucose, Serum: 113 mg/dL (03-27 @ 10:40)  Glucose, Serum: 100 mg/dL (03-25 @ 07:22)  Glucose, Serum: 100 mg/dL (03-24 @ 12:48)      30 Mar 2020 06:39    141    |  111    |  17     ----------------------------<  105    3.9     |  26     |  1.81   27 Mar 2020 10:40    138    |  106    |  22     ----------------------------<  113    3.7     |  26     |  2.37   25 Mar 2020 07:22    134    |  103    |  18     ----------------------------<  100    3.5     |  24     |  2.04   24 Mar 2020 12:48    134    |  101    |  15     ----------------------------<  100    3.8     |  28     |  1.68     Ca    7.9        30 Mar 2020 06:39  Ca    7.8        27 Mar 2020 10:40  Ca    7.8        25 Mar 2020 07:22  Ca    8.0        24 Mar 2020 12:48  Mg     2.1       24 Mar 2020 12:48    TPro  6.9    /  Alb  2.4    /  TBili  0.8    /  DBili  x      /  AST  74     /  ALT  47     /  AlkPhos  52     24 Mar 2020 12:48              CAPILLARY BLOOD GLUCOSE          C-Reactive Protein, Serum: 11.33 mg/dL (03-27 @ 14:45)  C-Reactive Protein, Serum: 9.27 mg/dL (03-25 @ 14:03)      RADIOLOGY & ADDITIONAL TESTS:    Imaging Personally Reviewed:  [ ] YES  [ ] NO    Consultant(s) Notes Reviewed:  [ ] YES  [ ] NO    Care Discussed with Consultants/Other Providers [ ] YES  [ ] NO Patient is a 64y old  Male who presents with a chief complaint of syncope (29 Mar 2020 17:59)      HPI:  65yo male with pmh of MG presents from PMD office for syncopal episode today. Complains of worsening nausea with lack of appetite and subjective fever/chills x 1 week. States that he was diagnosed with diverticulitis 2 weeks ago and put on abx for it. Went to PMD for worsening symptoms today, felt very dizzy and fainted. States that PMD said he did not hit his head. Denies abdominal pain, vomiting, diarrhea, bloody in stool/black stool, chest pain, palpitations, sob, urinary incontinence and other assocaited sx. (24 Mar 2020 19:35)      INTERVAL HPI/OVERNIGHT EVENTS:  Nausea associated with dizziness. Tolerating regular diet. The patient denies melena, hematochezia, hematemesis, vomiting, abdominal pain, constipation, diarrhea, or change in bowel movements     MEDICATIONS  (STANDING):  meropenem  IVPB 1000 milliGRAM(s) IV Intermittent every 12 hours  pyridostigmine 60 milliGRAM(s) Oral three times a day    MEDICATIONS  (PRN):  acetaminophen   Tablet .. 650 milliGRAM(s) Oral every 6 hours PRN Temp greater or equal to 38C (100.4F), Mild Pain (1 - 3)  ondansetron Injectable 4 milliGRAM(s) IV Push every 6 hours PRN Nausea and/or Vomiting      FAMILY HISTORY:      Allergies    No Known Allergies    Intolerances        PMH/PSH:  Eye muscle weakness, right  High cholesterol  Kidney stones  History of appendectomy        REVIEW OF SYSTEMS:  CONSTITUTIONAL: No fever, weight loss,  EYES: No eye pain, visual disturbances, or discharge  ENMT:  No difficulty hearing, tinnitus, vertigo; No sinus or throat pain  NECK: No pain or stiffness  BREASTS: No pain, masses, or nipple discharge  RESPIRATORY: No cough, wheezing, chills or hemoptysis; No shortness of breath  CARDIOVASCULAR: No chest pain, palpitations, dizziness, or leg swelling  GASTROINTESTINAL: See above.  GENITOURINARY: No dysuria, frequency, hematuria, or incontinence  NEUROLOGICAL: No headaches, memory loss, loss of strength, numbness, or tremors  SKIN: No itching, burning, rashes, or lesions   LYMPH NODES: No enlarged glands  ENDOCRINE: No heat or cold intolerance; No hair loss  MUSCULOSKELETAL: No joint pain or swelling; No muscle, back, or extremity pain  PSYCHIATRIC: No depression, anxiety, mood swings, or difficulty sleeping  HEME/LYMPH: No easy bruising, or bleeding gums  ALLERGY AND IMMUNOLOGIC: No hives or eczema    Vital Signs Last 24 Hrs  T(C): 36.4 (30 Mar 2020 12:00), Max: 37.5 (30 Mar 2020 00:35)  T(F): 97.6 (30 Mar 2020 12:00), Max: 99.5 (30 Mar 2020 00:35)  HR: 72 (30 Mar 2020 12:00) (69 - 83)  BP: 160/84 (30 Mar 2020 12:00) (107/87 - 160/84)  BP(mean): --  RR: 17 (30 Mar 2020 12:00) (17 - 18)  SpO2: 92% (30 Mar 2020 12:00) (85% - 93%)    PHYSICAL EXAM:  GENERAL: NAD, well-groomed, well-developed  HEAD:  Atraumatic, Normocephalic  EYES: EOMI, PERRLA, conjunctiva and sclera clear  ENMT: No tonsillar erythema, exudates, or enlargement; Moist mucous membranes, Good dentition, No lesions  NECK: Supple, No JVD, Normal thyroid  NERVOUS SYSTEM:  Alert & Oriented X3, Good concentration; Motor Strength 5/5 B/L upper and lower extremities;   CHEST/LUNG: Clear to percussion bilaterally; No rales, rhonchi, wheezing, or rubs  HEART: Regular rate and rhythm; No murmurs, rubs, or gallops  ABDOMEN: Soft, Nontender, Nondistended; Bowel sounds present  EXTREMITIES:  2+ Peripheral Pulses, No clubbing, cyanosis, or edema  LYMPH: No lymphadenopathy noted  SKIN: No rashes or lesions    LAB                          11.8   8.70  )-----------( 339      ( 30 Mar 2020 09:43 )             36.4       CBC:  03-30 @ 09:43  WBC 8.70   Hgb 11.8   Hct 36.4   Plts 339  MCV 84.8  03-27 @ 10:40  WBC 7.23   Hgb 12.0   Hct 36.3   Plts 337  MCV 84.4  03-25 @ 07:22  WBC 9.01   Hgb 12.9   Hct 39.9   Plts 279  MCV 84.7  03-24 @ 12:48  WBC 7.64   Hgb 15.3   Hct 45.5   Plts 283  MCV 83.2      Chemistry:  03-30 @ 06:39  Na+ 141  K+ 3.9  Cl- 111  CO2 26  BUN 17  Cr 1.81     03-27 @ 10:40  Na+ 138  K+ 3.7  Cl- 106  CO2 26  BUN 22  Cr 2.37     03-25 @ 07:22  Na+ 134  K+ 3.5  Cl- 103  CO2 24  BUN 18  Cr 2.04     03-24 @ 12:48  Na+ 134  K+ 3.8  Cl- 101  CO2 28  BUN 15  Cr 1.68         Glucose, Serum: 105 mg/dL (03-30 @ 06:39)  Glucose, Serum: 113 mg/dL (03-27 @ 10:40)  Glucose, Serum: 100 mg/dL (03-25 @ 07:22)  Glucose, Serum: 100 mg/dL (03-24 @ 12:48)      30 Mar 2020 06:39    141    |  111    |  17     ----------------------------<  105    3.9     |  26     |  1.81   27 Mar 2020 10:40    138    |  106    |  22     ----------------------------<  113    3.7     |  26     |  2.37   25 Mar 2020 07:22    134    |  103    |  18     ----------------------------<  100    3.5     |  24     |  2.04   24 Mar 2020 12:48    134    |  101    |  15     ----------------------------<  100    3.8     |  28     |  1.68     Ca    7.9        30 Mar 2020 06:39  Ca    7.8        27 Mar 2020 10:40  Ca    7.8        25 Mar 2020 07:22  Ca    8.0        24 Mar 2020 12:48  Mg     2.1       24 Mar 2020 12:48    TPro  6.9    /  Alb  2.4    /  TBili  0.8    /  DBili  x      /  AST  74     /  ALT  47     /  AlkPhos  52     24 Mar 2020 12:48              CAPILLARY BLOOD GLUCOSE          C-Reactive Protein, Serum: 11.33 mg/dL (03-27 @ 14:45)  C-Reactive Protein, Serum: 9.27 mg/dL (03-25 @ 14:03)      RADIOLOGY & ADDITIONAL TESTS:    Imaging Personally Reviewed:  [ ] YES  [ ] NO    Consultant(s) Notes Reviewed:  [ ] YES  [ ] NO    Care Discussed with Consultants/Other Providers [ ] YES  [ ] NO

## 2020-03-30 NOTE — PROGRESS NOTE ADULT - ASSESSMENT
64 years old male with history of recently treated diverticultiis presents with syncope Doing better would perfer discharge given Covid as patient is improving     Problem/Plan - 1:  ·  Problem: Diverticulitis.  Plan: Merrem  - also has intramural abscess on CT  - evaluated by Surgery and images reviewed by IR, no role for surgical or IR drainage.  Recommend IV abx and Colorectal Eval with Dr. Ibrahim on Friday for ?elective colon resection.   - Spoke to Dr. Ibrahim recommend out patient follow up.   - GI eval appreciated   - MID line insertion d/c planning on iv Invanz.     ·  Problem: CAP (community acquired pneumonia).  Plan: On Zithromax  infectious disease consult- artis  COVID and RVP is negative     Problem/Plan - 3:  ·  Problem: High cholesterol.  Plan: dash.     Problem/Plan - 4:  ·  Problem: Syncope.  Plan: has had recurrent Syncope likely in the setting of infection   Echocardiogram reviewed.        Problem/Plan - 5:  ·  Problem: BARBARA   - Likely Post contrast Nephropathy- follow up renal sono.   -Urine for Eosinophils

## 2020-03-30 NOTE — PROGRESS NOTE ADULT - ASSESSMENT
HPI:  65yo male with pmh of MG presents from PMD office for syncopal episode today. Complains of worsening nausea with lack of appetite and subjective fever/chills x 1 week. States that he was diagnosed with diverticulitis 2 weeks ago and put on abx for it. Went to PMD for worsening symptoms today, felt very dizzy and fainted. States that PMD said he did not hit his head. Denies abdominal pain, vomiting, diarrhea, bloody in stool/black stool, chest pain, palpitations, sob, urinary incontinence and other assocaited sx. (24 Mar 2020 19:35)  ------------------------- As Above ------------------------------- Patient seen earlier today  The patient states he was hospitalized for two days with a diagnosis of diverticulitis last week. He was discharged with two antibiotics. He finished one but the second one was not completed because it made him nauseous. He never did have a recurrence of abdominal pain but was very nauseous.   Patient presented with a syncopal episode.   Also had an episode of diverticulitis last October  Still feels nauseous today Tolerated clear liquids today. Had a normal colonoscopy 4 years ago.    Diverticulitis with abscess. ? Vertigo.  Second episode. On Merrem. CRP 9.27 --> 11.33  WBC 7.23 --> 8.20  Tolerated regular diet 1) f/u CRP,  labs  2) surgical f/u 3) F/U CT scan if CRP does not show significant improvement

## 2020-03-31 LAB
ALBUMIN SERPL ELPH-MCNC: 2 G/DL — LOW (ref 3.3–5)
ALP SERPL-CCNC: 50 U/L — SIGNIFICANT CHANGE UP (ref 40–120)
ALT FLD-CCNC: 111 U/L — HIGH (ref 12–78)
ANION GAP SERPL CALC-SCNC: 6 MMOL/L — SIGNIFICANT CHANGE UP (ref 5–17)
AST SERPL-CCNC: 80 U/L — HIGH (ref 15–37)
BILIRUB SERPL-MCNC: 0.8 MG/DL — SIGNIFICANT CHANGE UP (ref 0.2–1.2)
BUN SERPL-MCNC: 17 MG/DL — SIGNIFICANT CHANGE UP (ref 7–23)
CALCIUM SERPL-MCNC: 8.1 MG/DL — LOW (ref 8.5–10.1)
CHLORIDE SERPL-SCNC: 111 MMOL/L — HIGH (ref 96–108)
CO2 SERPL-SCNC: 25 MMOL/L — SIGNIFICANT CHANGE UP (ref 22–31)
CREAT SERPL-MCNC: 1.77 MG/DL — HIGH (ref 0.5–1.3)
CRP SERPL-MCNC: 6.35 MG/DL — HIGH (ref 0–0.4)
GLUCOSE SERPL-MCNC: 80 MG/DL — SIGNIFICANT CHANGE UP (ref 70–99)
HCT VFR BLD CALC: 37 % — LOW (ref 39–50)
HGB BLD-MCNC: 12.3 G/DL — LOW (ref 13–17)
MAGNESIUM SERPL-MCNC: 2 MG/DL — SIGNIFICANT CHANGE UP (ref 1.6–2.6)
MCHC RBC-ENTMCNC: 27.7 PG — SIGNIFICANT CHANGE UP (ref 27–34)
MCHC RBC-ENTMCNC: 33.2 GM/DL — SIGNIFICANT CHANGE UP (ref 32–36)
MCV RBC AUTO: 83.3 FL — SIGNIFICANT CHANGE UP (ref 80–100)
NRBC # BLD: 0 /100 WBCS — SIGNIFICANT CHANGE UP (ref 0–0)
PHOSPHATE SERPL-MCNC: 1.9 MG/DL — LOW (ref 2.5–4.5)
PLATELET # BLD AUTO: 339 K/UL — SIGNIFICANT CHANGE UP (ref 150–400)
POTASSIUM SERPL-MCNC: 4.3 MMOL/L — SIGNIFICANT CHANGE UP (ref 3.5–5.3)
POTASSIUM SERPL-SCNC: 4.3 MMOL/L — SIGNIFICANT CHANGE UP (ref 3.5–5.3)
PROT SERPL-MCNC: 6.5 GM/DL — SIGNIFICANT CHANGE UP (ref 6–8.3)
RBC # BLD: 4.44 M/UL — SIGNIFICANT CHANGE UP (ref 4.2–5.8)
RBC # FLD: 13.3 % — SIGNIFICANT CHANGE UP (ref 10.3–14.5)
SODIUM SERPL-SCNC: 142 MMOL/L — SIGNIFICANT CHANGE UP (ref 135–145)
WBC # BLD: 11.47 K/UL — HIGH (ref 3.8–10.5)
WBC # FLD AUTO: 11.47 K/UL — HIGH (ref 3.8–10.5)

## 2020-03-31 PROCEDURE — 99232 SBSQ HOSP IP/OBS MODERATE 35: CPT

## 2020-03-31 PROCEDURE — 74176 CT ABD & PELVIS W/O CONTRAST: CPT | Mod: 26

## 2020-03-31 RX ORDER — HYDRALAZINE HCL 50 MG
10 TABLET ORAL ONCE
Refills: 0 | Status: COMPLETED | OUTPATIENT
Start: 2020-03-31 | End: 2020-03-31

## 2020-03-31 RX ORDER — IOHEXOL 300 MG/ML
30 INJECTION, SOLUTION INTRAVENOUS ONCE
Refills: 0 | Status: COMPLETED | OUTPATIENT
Start: 2020-03-31 | End: 2020-03-31

## 2020-03-31 RX ADMIN — PYRIDOSTIGMINE BROMIDE 60 MILLIGRAM(S): 60 SOLUTION ORAL at 05:10

## 2020-03-31 RX ADMIN — PYRIDOSTIGMINE BROMIDE 60 MILLIGRAM(S): 60 SOLUTION ORAL at 13:57

## 2020-03-31 RX ADMIN — MEROPENEM 100 MILLIGRAM(S): 1 INJECTION INTRAVENOUS at 18:48

## 2020-03-31 RX ADMIN — IOHEXOL 30 MILLILITER(S): 300 INJECTION, SOLUTION INTRAVENOUS at 18:47

## 2020-03-31 RX ADMIN — Medication 10 MILLIGRAM(S): at 03:07

## 2020-03-31 RX ADMIN — MEROPENEM 100 MILLIGRAM(S): 1 INJECTION INTRAVENOUS at 05:10

## 2020-03-31 NOTE — PROGRESS NOTE ADULT - ASSESSMENT
HPI:  63yo male with pmh of MG presents from PMD office for syncopal episode today. Complains of worsening nausea with lack of appetite and subjective fever/chills x 1 week. States that he was diagnosed with diverticulitis 2 weeks ago and put on abx for it. Went to PMD for worsening symptoms today, felt very dizzy and fainted. States that PMD said he did not hit his head. Denies abdominal pain, vomiting, diarrhea, bloody in stool/black stool, chest pain, palpitations, sob, urinary incontinence and other assocaited sx. (24 Mar 2020 19:35)  ------------------------- As Above ------------------------------- Patient seen earlier today  The patient states he was hospitalized for two days with a diagnosis of diverticulitis last week. He was discharged with two antibiotics. He finished one but the second one was not completed because it made him nauseous. He never did have a recurrence of abdominal pain but was very nauseous.   Patient presented with a syncopal episode.   Also had an episode of diverticulitis last October  Still feels nauseous today Tolerated clear liquids today. Had a normal colonoscopy 4 years ago.    Diverticulitis with abscess. ? Vertigo.  Second episode. On Merrem. CRP 9.27 --> 11.33  WBC 7.23 --> 8.20  Tolerated regular diet 1) f/u CRP,  labs  2) surgical f/u 3) F/U CT scan if CRP does not show significant improvement HPI:  65yo male with pmh of MG presents from PMD office for syncopal episode today. Complains of worsening nausea with lack of appetite and subjective fever/chills x 1 week. States that he was diagnosed with diverticulitis 2 weeks ago and put on abx for it. Went to PMD for worsening symptoms today, felt very dizzy and fainted. States that PMD said he did not hit his head. Denies abdominal pain, vomiting, diarrhea, bloody in stool/black stool, chest pain, palpitations, sob, urinary incontinence and other assocaited sx. (24 Mar 2020 19:35)  ------------------------- As Above ------------------------------- Patient seen earlier today  The patient states he was hospitalized for two days with a diagnosis of diverticulitis last week. He was discharged with two antibiotics. He finished one but the second one was not completed because it made him nauseous. He never did have a recurrence of abdominal pain but was very nauseous.   Patient presented with a syncopal episode.   Also had an episode of diverticulitis last October  Still feels nauseous today Tolerated clear liquids today. Had a normal colonoscopy 4 years ago.    Diverticulitis with abscess. ? Vertigo.  Second episode. On Merrem. CRP 9.27 --> 11.33 --> 3.66 --> 6.35  WBC 7.23 --> 8.20  --> 11.47 Tolerated regular diet 1) f/u CRP,  labs  2) surgical f/u 3) F/U CT HPI:  65yo male with pmh of MG presents from PMD office for syncopal episode today. Complains of worsening nausea with lack of appetite and subjective fever/chills x 1 week. States that he was diagnosed with diverticulitis 2 weeks ago and put on abx for it. Went to PMD for worsening symptoms today, felt very dizzy and fainted. States that PMD said he did not hit his head. Denies abdominal pain, vomiting, diarrhea, bloody in stool/black stool, chest pain, palpitations, sob, urinary incontinence and other assocaited sx. (24 Mar 2020 19:35)  ------------------------- As Above ------------------------------- Patient seen earlier today  The patient states he was hospitalized for two days with a diagnosis of diverticulitis last week. He was discharged with two antibiotics. He finished one but the second one was not completed because it made him nauseous. He never did have a recurrence of abdominal pain but was very nauseous.   Patient presented with a syncopal episode.   Also had an episode of diverticulitis last October  Still feels nauseous today Tolerated clear liquids today. Had a normal colonoscopy 4 years ago.    Diverticulitis with abscess. ? Vertigo.  Second episode. On Merrem. CRP 9.27 --> 11.33 --> 3.66 --> 6.35  WBC 7.23 --> 8.20  --> 11.47 Tolerated regular diet 1) f/u CRP,  labs  2) surgical f/u 3) F/U CT   Elevated LFTs - 0.8 / 11 / 40 / 30 - probably secondary to antibiotics / sepsis - supportive care for now ( F/U LFTs - see above ) 65 yo male [facility] MG presents from PMD office for syncopal episode today. Complains of worsening nausea with lack of appetite and subjective fever/chills x 1 week. States that he was diagnosed with diverticulitis 2 weeks ago and put on abx for it. Went to PMD for worsening symptoms today, felt very dizzy and fainted. States that PMD said he did not hit his head. Denies abdominal pain, vomiting, diarrhea, bloody in stool/black stool, chest pain, palpitations, sob, urinary incontinence and other assocaited sx. (24 Mar 2020 19:35)    The patient states he was hospitalized for two days with a diagnosis of diverticulitis last week. He was discharged with two antibiotics. He finished one but the second one was not completed because it made him nauseous. He never did have a recurrence of abdominal pain but was very nauseous.   Patient presented with a syncopal episode.  Also had an episode of diverticulitis last October.  Still feels nauseous today Tolerated clear liquids today. Had a normal colonoscopy 4 years ago.     Plan: For repeat CT abdomen and pelvis po contrast. Contineu IV Meropenem. 63 yo male [facility] MG presents from PMD office for syncopal episode today. Complains of worsening nausea with lack of appetite and subjective fever/chills x 1 week. States that he was diagnosed with diverticulitis 2 weeks ago and put on abx for it. Went to PMD for worsening symptoms today, felt very dizzy and fainted. States that PMD said he did not hit his head. Denies abdominal pain, vomiting, diarrhea, bloody in stool/black stool, chest pain, palpitations, sob, urinary incontinence and other assocaited sx. (24 Mar 2020 19:35)    The patient states he was hospitalized for two days with a diagnosis of diverticulitis last week. He was discharged with two antibiotics. He finished one but the second one was not completed because it made him nauseous. He never did have a recurrence of abdominal pain but was very nauseous.   Patient presented with a syncopal episode.  Also had an episode of diverticulitis last October.  Still feels nauseous today Tolerated clear liquids today. Had a normal colonoscopy 4 years ago.     Plan: For repeat CT abdomen and pelvis po contrast. Contineu IV Meropenem.      Patient seen and examined [id number]. Written [id number]. Signed [id number] 11:26 AM

## 2020-03-31 NOTE — DIETITIAN INITIAL EVALUATION ADULT. - DIET TYPE
supplement (specify)/fiber/residue restricted/Ensure Clear x 2/day (provides 480 kcal, 16 g protein)

## 2020-03-31 NOTE — PROGRESS NOTE ADULT - SUBJECTIVE AND OBJECTIVE BOX
Patient is a 64y old  Male who presents with a chief complaint of syncope (31 Mar 2020 10:54)      HPI:  65yo male with pmh of MG presents from PMD office for syncopal episode today. Complains of worsening nausea with lack of appetite and subjective fever/chills x 1 week. States that he was diagnosed with diverticulitis 2 weeks ago and put on abx for it. Went to PMD for worsening symptoms today, felt very dizzy and fainted. States that PMD said he did not hit his head. Denies abdominal pain, vomiting, diarrhea, bloody in stool/black stool, chest pain, palpitations, sob, urinary incontinence and other assocaited sx. (24 Mar 2020 19:35)      INTERVAL HPI/OVERNIGHT EVENTS:  Patient seen earlier today  Nausea better but still present. Related to lying down in bed. Patient is a poor historian. Tolerating solid diet. The patient denies melena, hematochezia, hematemesis,  vomiting, abdominal pain, constipation, diarrhea, or change in bowel movements     MEDICATIONS  (STANDING):  meropenem  IVPB 1000 milliGRAM(s) IV Intermittent every 12 hours  pyridostigmine 60 milliGRAM(s) Oral three times a day    MEDICATIONS  (PRN):  acetaminophen   Tablet .. 650 milliGRAM(s) Oral every 6 hours PRN Temp greater or equal to 38C (100.4F), Mild Pain (1 - 3)  acetaminophen   Tablet .. 650 milliGRAM(s) Oral every 6 hours PRN Moderate Pain (4 - 6)  ondansetron Injectable 4 milliGRAM(s) IV Push every 6 hours PRN Nausea and/or Vomiting      FAMILY HISTORY:      Allergies    No Known Allergies    Intolerances        PMH/PSH:  Eye muscle weakness, right  High cholesterol  Kidney stones  History of appendectomy        REVIEW OF SYSTEMS:  CONSTITUTIONAL: No fever, weight loss,   EYES: No eye pain, visual disturbances, or discharge  ENMT:  No difficulty hearing, tinnitus, vertigo; No sinus or throat pain  NECK: No pain or stiffness  BREASTS: No pain, masses, or nipple discharge  RESPIRATORY: No cough, wheezing, chills or hemoptysis; No shortness of breath  CARDIOVASCULAR: No chest pain, palpitations, dizziness, or leg swelling  GASTROINTESTINAL: See above  GENITOURINARY: No dysuria, frequency, hematuria, or incontinence  NEUROLOGICAL: No headaches, memory loss, loss of strength, numbness, or tremors  SKIN: No itching, burning, rashes, or lesions   LYMPH NODES: No enlarged glands  ENDOCRINE: No heat or cold intolerance; No hair loss  MUSCULOSKELETAL: No joint pain or swelling; No muscle, back, or extremity pain  PSYCHIATRIC: No depression, anxiety, mood swings, or difficulty sleeping  HEME/LYMPH: No easy bruising, or bleeding gums  ALLERGY AND IMMUNOLOGIC: No hives or eczema    Vital Signs Last 24 Hrs  T(C): 36.9 (31 Mar 2020 11:48), Max: 37.7 (31 Mar 2020 05:00)  T(F): 98.5 (31 Mar 2020 11:48), Max: 99.8 (31 Mar 2020 05:00)  HR: 86 (31 Mar 2020 11:48) (65 - 86)  BP: 171/93 (31 Mar 2020 11:48) (167/88 - 181/99)  BP(mean): --  RR: 18 (31 Mar 2020 11:48) (17 - 18)  SpO2: 92% (31 Mar 2020 11:48) (90% - 94%)    PHYSICAL EXAM:  GENERAL: NAD, well-groomed, well-developed  HEAD:  Atraumatic, Normocephalic  EYES: EOMI, PERRLA, conjunctiva and sclera clear  NECK: Supple, No JVD, Normal thyroid  NERVOUS SYSTEM:  Alert & Oriented X3, Good concentration;   CHEST/LUNG: Clear to percussion bilaterally; No rales, rhonchi, wheezing, or rubs  HEART: Regular rate and rhythm; No murmurs, rubs, or gallops  ABDOMEN: Soft, Nontender, Nondistended; Bowel sounds present  EXTREMITIES:  2+ Peripheral Pulses, No clubbing, cyanosis, or edema  LYMPH: No lymphadenopathy noted  SKIN: No rashes or lesions    LAB                          12.3   11.47 )-----------( 339      ( 31 Mar 2020 08:05 )             37.0       CBC:  03-31 @ 08:05  WBC 11.47   Hgb 12.3   Hct 37.0   Plts 339  MCV 83.3  03-30 @ 09:43  WBC 8.70   Hgb 11.8   Hct 36.4   Plts 339  MCV 84.8  03-27 @ 10:40  WBC 7.23   Hgb 12.0   Hct 36.3   Plts 337  MCV 84.4  03-25 @ 07:22  WBC 9.01   Hgb 12.9   Hct 39.9   Plts 279  MCV 84.7  03-24 @ 12:48  WBC 7.64   Hgb 15.3   Hct 45.5   Plts 283  MCV 83.2      Chemistry:  03-31 @ 08:05  Na+ 142  K+ 4.3  Cl- 111  CO2 25  BUN 17  Cr 1.77     03-30 @ 06:39  Na+ 141  K+ 3.9  Cl- 111  CO2 26  BUN 17  Cr 1.81     03-27 @ 10:40  Na+ 138  K+ 3.7  Cl- 106  CO2 26  BUN 22  Cr 2.37     03-25 @ 07:22  Na+ 134  K+ 3.5  Cl- 103  CO2 24  BUN 18  Cr 2.04     03-24 @ 12:48  Na+ 134  K+ 3.8  Cl- 101  CO2 28  BUN 15  Cr 1.68         Glucose, Serum: 80 mg/dL (03-31 @ 08:05)  Glucose, Serum: 105 mg/dL (03-30 @ 06:39)  Glucose, Serum: 113 mg/dL (03-27 @ 10:40)  Glucose, Serum: 100 mg/dL (03-25 @ 07:22)  Glucose, Serum: 100 mg/dL (03-24 @ 12:48)      31 Mar 2020 08:05    142    |  111    |  17     ----------------------------<  80     4.3     |  25     |  1.77   30 Mar 2020 06:39    141    |  111    |  17     ----------------------------<  105    3.9     |  26     |  1.81   27 Mar 2020 10:40    138    |  106    |  22     ----------------------------<  113    3.7     |  26     |  2.37   25 Mar 2020 07:22    134    |  103    |  18     ----------------------------<  100    3.5     |  24     |  2.04   24 Mar 2020 12:48    134    |  101    |  15     ----------------------------<  100    3.8     |  28     |  1.68     Ca    8.1        31 Mar 2020 08:05  Ca    7.9        30 Mar 2020 06:39  Ca    7.8        27 Mar 2020 10:40  Ca    7.8        25 Mar 2020 07:22  Ca    8.0        24 Mar 2020 12:48  Phos  1.9       31 Mar 2020 08:05  Mg     2.0       31 Mar 2020 08:05  Mg     2.1       24 Mar 2020 12:48    TPro  6.5    /  Alb  2.0    /  TBili  0.8    /  DBili  x      /  AST  80     /  ALT  111    /  AlkPhos  50     31 Mar 2020 08:05  TPro  6.9    /  Alb  2.4    /  TBili  0.8    /  DBili  x      /  AST  74     /  ALT  47     /  AlkPhos  52     24 Mar 2020 12:48              CAPILLARY BLOOD GLUCOSE          C-Reactive Protein, Serum: 6.35 mg/dL (03-31 @ 10:47)  C-Reactive Protein, Serum: 3.66 mg/dL (03-30 @ 14:29)  C-Reactive Protein, Serum: 11.33 mg/dL (03-27 @ 14:45)  C-Reactive Protein, Serum: 9.27 mg/dL (03-25 @ 14:03)      RADIOLOGY & ADDITIONAL TESTS:    Imaging Personally Reviewed:  [ ] YES  [ ] NO    Consultant(s) Notes Reviewed:  [ ] YES  [ ] NO    Care Discussed with Consultants/Other Providers [ ] YES  [ ] NO

## 2020-03-31 NOTE — DIETITIAN INITIAL EVALUATION ADULT. - PERTINENT LABORATORY DATA
03-31 Na142 mmol/L Glu 80 mg/dL K+ 4.3 mmol/L Cr  1.77 mg/dL<H> BUN 17 mg/dL 03-31 Phos 1.9 mg/dL<L> 03-31 Alb 2.0 g/dL<L>

## 2020-03-31 NOTE — DIETITIAN INITIAL EVALUATION ADULT. - ENERGY NEEDS
Height (cm):  170.18  Weight (kg): 85.1 (03-27)  BMI = 29.4  IBW:  67.3 kg +/- 10%   % IBW:   126%    UBW:   stable      %UBW: 100%

## 2020-03-31 NOTE — PROGRESS NOTE ADULT - SUBJECTIVE AND OBJECTIVE BOX
INTERVAL HPI/OVERNIGHT EVENTS:  Pt seen and examined at bedside.     Allergies/Intolerance: No Known Allergies      MEDICATIONS  (STANDING):  meropenem  IVPB 1000 milliGRAM(s) IV Intermittent every 12 hours  pyridostigmine 60 milliGRAM(s) Oral three times a day    MEDICATIONS  (PRN):  acetaminophen   Tablet .. 650 milliGRAM(s) Oral every 6 hours PRN Temp greater or equal to 38C (100.4F), Mild Pain (1 - 3)  acetaminophen   Tablet .. 650 milliGRAM(s) Oral every 6 hours PRN Moderate Pain (4 - 6)  ondansetron Injectable 4 milliGRAM(s) IV Push every 6 hours PRN Nausea and/or Vomiting        ROS: all systems reviewed and wnl      PHYSICAL EXAMINATION:  Vital Signs Last 24 Hrs  T(C): 37.7 (31 Mar 2020 05:00), Max: 37.7 (31 Mar 2020 05:00)  T(F): 99.8 (31 Mar 2020 05:00), Max: 99.8 (31 Mar 2020 05:00)  HR: 79 (31 Mar 2020 05:00) (65 - 79)  BP: 167/88 (31 Mar 2020 05:00) (160/84 - 181/99)  BP(mean): --  RR: 17 (31 Mar 2020 05:00) (17 - 17)  SpO2: 93% (31 Mar 2020 05:00) (90% - 94%)  CAPILLARY BLOOD GLUCOSE          03-30 @ 07:01  -  03-31 @ 07:00  --------------------------------------------------------  IN: 240 mL / OUT: 1000 mL / NET: -760 mL        GENERAL:   NECK: supple, No JVD  CHEST/LUNG: clear to auscultation bilaterally; no rales, rhonchi, or wheezing b/l  HEART: normal S1, S2  ABDOMEN: BS+, soft, ND, NT   EXTREMITIES:  pulses palpable; no clubbing, cyanosis, or edema b/l LEs  SKIN: no rashes or lesions      LABS:                        12.3   11.47 )-----------( 339      ( 31 Mar 2020 08:05 )             37.0     03-31    142  |  111<H>  |  17  ----------------------------<  80  4.3   |  25  |  1.77<H>    Ca    8.1<L>      31 Mar 2020 08:05  Phos  1.9     03-31  Mg     2.0     03-31    TPro  6.5  /  Alb  2.0<L>  /  TBili  0.8  /  DBili  x   /  AST  80<H>  /  ALT  111<H>  /  AlkPhos  50  03-31 INTERVAL HPI/OVERNIGHT EVENTS:  Pt seen and examined at bedside.     Allergies/Intolerance: No Known Allergies      MEDICATIONS  (STANDING):  meropenem  IVPB 1000 milliGRAM(s) IV Intermittent every 12 hours  pyridostigmine 60 milliGRAM(s) Oral three times a day    MEDICATIONS  (PRN):  acetaminophen   Tablet .. 650 milliGRAM(s) Oral every 6 hours PRN Temp greater or equal to 38C (100.4F), Mild Pain (1 - 3)  acetaminophen   Tablet .. 650 milliGRAM(s) Oral every 6 hours PRN Moderate Pain (4 - 6)  ondansetron Injectable 4 milliGRAM(s) IV Push every 6 hours PRN Nausea and/or Vomiting        ROS: all systems reviewed and wnl      PHYSICAL EXAMINATION:  Vital Signs Last 24 Hrs  T(C): 37.7 (31 Mar 2020 05:00), Max: 37.7 (31 Mar 2020 05:00)  T(F): 99.8 (31 Mar 2020 05:00), Max: 99.8 (31 Mar 2020 05:00)  HR: 79 (31 Mar 2020 05:00) (65 - 79)  BP: 167/88 (31 Mar 2020 05:00) (160/84 - 181/99)  BP(mean): --  RR: 17 (31 Mar 2020 05:00) (17 - 17)  SpO2: 93% (31 Mar 2020 05:00) (90% - 94%)  CAPILLARY BLOOD GLUCOSE          03-30 @ 07:01  -  03-31 @ 07:00  --------------------------------------------------------  IN: 240 mL / OUT: 1000 mL / NET: -760 mL        GENERAL: comfortable, NAD, no abdominal pain or loose BM, no fevers  NECK: supple, No JVD  CHEST/LUNG: clear to auscultation bilaterally; no rales, rhonchi, or wheezing b/l  HEART: normal S1, S2  ABDOMEN: BS+, soft, ND, NT   EXTREMITIES:  pulses palpable; no clubbing, cyanosis, or edema b/l LEs  SKIN: no rashes or lesions      LABS:                        12.3   11.47 )-----------( 339      ( 31 Mar 2020 08:05 )             37.0     03-31    142  |  111<H>  |  17  ----------------------------<  80  4.3   |  25  |  1.77<H>    Ca    8.1<L>      31 Mar 2020 08:05  Phos  1.9     03-31  Mg     2.0     03-31    TPro  6.5  /  Alb  2.0<L>  /  TBili  0.8  /  DBili  x   /  AST  80<H>  /  ALT  111<H>  /  AlkPhos  50  03-31

## 2020-03-31 NOTE — DIETITIAN INITIAL EVALUATION ADULT. - OTHER INFO
Pt lives c spouse who does shopping & cooking; independent c ADL. Denies any V/C/D or chew/swallowing difficulty. Pt reports wt has been stable & appetite is usually good. Reviewed low fiber diet recommendations c pt & provided handout for home reference.

## 2020-03-31 NOTE — PROGRESS NOTE ADULT - ASSESSMENT
64 years old male with history of recently treated diverticultiis presents with syncope Doing better would perfer discharge given Covid as patient is improving     Problem/Plan - 1:  ·  Problem: Diverticulitis.  Plan: Merrem  - also has intramural abscess on CT  - evaluated by Surgery and images reviewed by IR, no role for surgical or IR drainage.  Recommend IV abx and Colorectal Eval with Dr. Ibrahim on Friday for ?elective colon resection.   - Spoke to Dr. Ibrahim recommend out patient follow up.   - GI eval appreciated   - MID line insertion d/c planning on iv Invanz.     ·  Problem: CAP (community acquired pneumonia).  Plan: On Zithromax  infectious disease consult- artis  COVID and RVP is negative     Problem/Plan - 3:  ·  Problem: High cholesterol.  Plan: dash.     Problem/Plan - 4:  ·  Problem: Syncope.  Plan: has had recurrent Syncope likely in the setting of infection   Echocardiogram reviewed.        Problem/Plan - 5:  ·  Problem: BARBARA   - Likely Post contrast Nephropathy- follow up renal sono.   -Urine for Eosinophils 64 years old male with history of recently treated diverticultiis presents with syncope Doing better would perfer discharge given Covid as patient is improving     Problem/Plan - 1:  ·  Problem: Diverticulitis.  Plan: Meopemen 1 gram every 12 hours.     Also has intramural abscess on CT, evaluated by Surgery and images reviewed by IR, no role for surgical or IR drainage.  Recommend IV abx and Colorectal Eval with Dr. Ibrahim on Friday for ?elective colon resection.   - Spoke to Dr. Ibrahim recommend out patient follow up.   - GI eval appreciated.  MID line insertion d/c planning on iv Invanz.      COVID and RVP is negative     Problem/Plan - 3:  ·  Problem: High cholesterol.  Plan: dash.     Problem/Plan - 4:  ·  Problem: Syncope.  Plan: has had recurrent Syncope likely in the setting of infection   Echocardiogram reviewed.        Problem/Plan - 5:  ·  Problem: BARBARA from contrast. Ffollow up renal sono.      -Urine for Eosinophils requested, not generally useful . 64 years old male with history of recently treated diverticultiis presents with syncope Doing better would perfer discharge given Covid as patient is improving     Problem/Plan - 1:  ·  Problem: Diverticulitis.  Plan: Meopemen 1 gram every 12 hours.     Also has intramural abscess on CT, evaluated by Surgery and images reviewed by IR, no role for surgical or IR drainage.  Recommend IV abx and Colorectal Eval with Dr. Ibrahim on Friday for ?elective colon resection.   - Spoke to Dr. Ibrahim recommend out patient follow up.   - GI eval appreciated.  MID line insertion d/c planning on iv Invanz.        COVID and RVP is negative.      Problem/Plan - 3:  ·  Problem: High cholesterol.  Plan: dash.     Problem/Plan - 4:  ·  Problem: Syncope.  Plan: has had recurrent Syncope likely in the setting of infection   Echocardiogram reviewed.        Problem/Plan - 5:  ·  Problem: BARBARA from contrast. Ffollow up renal sono.      -Urine for Eosinophils requested, not generally useful .      Has midline in place, discharge home in AM with IV ABX if CT abdomen stable.

## 2020-04-01 ENCOUNTER — TRANSCRIPTION ENCOUNTER (OUTPATIENT)
Age: 65
End: 2020-04-01

## 2020-04-01 LAB
ALBUMIN SERPL ELPH-MCNC: 2.2 G/DL — LOW (ref 3.3–5)
ALP SERPL-CCNC: 56 U/L — SIGNIFICANT CHANGE UP (ref 40–120)
ALT FLD-CCNC: 95 U/L — HIGH (ref 12–78)
AST SERPL-CCNC: 74 U/L — HIGH (ref 15–37)
BILIRUB DIRECT SERPL-MCNC: 0.17 MG/DL — SIGNIFICANT CHANGE UP (ref 0.05–0.2)
BILIRUB INDIRECT FLD-MCNC: 0.7 MG/DL — SIGNIFICANT CHANGE UP (ref 0.2–1)
BILIRUB SERPL-MCNC: 0.9 MG/DL — SIGNIFICANT CHANGE UP (ref 0.2–1.2)
HCT VFR BLD CALC: 38.4 % — LOW (ref 39–50)
HGB BLD-MCNC: 12.7 G/DL — LOW (ref 13–17)
MCHC RBC-ENTMCNC: 27.5 PG — SIGNIFICANT CHANGE UP (ref 27–34)
MCHC RBC-ENTMCNC: 33.1 GM/DL — SIGNIFICANT CHANGE UP (ref 32–36)
MCV RBC AUTO: 83.3 FL — SIGNIFICANT CHANGE UP (ref 80–100)
NRBC # BLD: 0 /100 WBCS — SIGNIFICANT CHANGE UP (ref 0–0)
PLATELET # BLD AUTO: 355 K/UL — SIGNIFICANT CHANGE UP (ref 150–400)
PROT SERPL-MCNC: 6.7 GM/DL — SIGNIFICANT CHANGE UP (ref 6–8.3)
RBC # BLD: 4.61 M/UL — SIGNIFICANT CHANGE UP (ref 4.2–5.8)
RBC # FLD: 13.5 % — SIGNIFICANT CHANGE UP (ref 10.3–14.5)
WBC # BLD: 9.31 K/UL — SIGNIFICANT CHANGE UP (ref 3.8–10.5)
WBC # FLD AUTO: 9.31 K/UL — SIGNIFICANT CHANGE UP (ref 3.8–10.5)

## 2020-04-01 PROCEDURE — 99233 SBSQ HOSP IP/OBS HIGH 50: CPT

## 2020-04-01 RX ORDER — PANTOPRAZOLE SODIUM 20 MG/1
1 TABLET, DELAYED RELEASE ORAL
Qty: 0 | Refills: 0 | DISCHARGE
Start: 2020-04-01

## 2020-04-01 RX ORDER — MEROPENEM 1 G/30ML
1000 INJECTION INTRAVENOUS ONCE
Refills: 0 | Status: COMPLETED | OUTPATIENT
Start: 2020-04-01 | End: 2020-04-01

## 2020-04-01 RX ORDER — AMLODIPINE BESYLATE 2.5 MG/1
1 TABLET ORAL
Qty: 0 | Refills: 0 | DISCHARGE
Start: 2020-04-01

## 2020-04-01 RX ORDER — MEROPENEM 1 G/30ML
1000 INJECTION INTRAVENOUS EVERY 12 HOURS
Refills: 0 | Status: DISCONTINUED | OUTPATIENT
Start: 2020-04-02 | End: 2020-04-02

## 2020-04-01 RX ORDER — MEROPENEM 1 G/30ML
1 INJECTION INTRAVENOUS
Qty: 0 | Refills: 0 | DISCHARGE
Start: 2020-04-01 | End: 2020-04-16

## 2020-04-01 RX ORDER — PANTOPRAZOLE SODIUM 20 MG/1
40 TABLET, DELAYED RELEASE ORAL
Refills: 0 | Status: DISCONTINUED | OUTPATIENT
Start: 2020-04-01 | End: 2020-04-02

## 2020-04-01 RX ORDER — MEROPENEM 1 G/30ML
INJECTION INTRAVENOUS
Refills: 0 | Status: DISCONTINUED | OUTPATIENT
Start: 2020-04-01 | End: 2020-04-02

## 2020-04-01 RX ORDER — AMLODIPINE BESYLATE 2.5 MG/1
5 TABLET ORAL DAILY
Refills: 0 | Status: DISCONTINUED | OUTPATIENT
Start: 2020-04-01 | End: 2020-04-02

## 2020-04-01 RX ADMIN — PYRIDOSTIGMINE BROMIDE 60 MILLIGRAM(S): 60 SOLUTION ORAL at 17:04

## 2020-04-01 RX ADMIN — MEROPENEM 100 MILLIGRAM(S): 1 INJECTION INTRAVENOUS at 07:00

## 2020-04-01 RX ADMIN — PANTOPRAZOLE SODIUM 40 MILLIGRAM(S): 20 TABLET, DELAYED RELEASE ORAL at 12:58

## 2020-04-01 RX ADMIN — PYRIDOSTIGMINE BROMIDE 60 MILLIGRAM(S): 60 SOLUTION ORAL at 00:03

## 2020-04-01 RX ADMIN — AMLODIPINE BESYLATE 5 MILLIGRAM(S): 2.5 TABLET ORAL at 07:00

## 2020-04-01 RX ADMIN — PYRIDOSTIGMINE BROMIDE 60 MILLIGRAM(S): 60 SOLUTION ORAL at 08:34

## 2020-04-01 RX ADMIN — MEROPENEM 100 MILLIGRAM(S): 1 INJECTION INTRAVENOUS at 13:30

## 2020-04-01 RX ADMIN — PYRIDOSTIGMINE BROMIDE 60 MILLIGRAM(S): 60 SOLUTION ORAL at 12:46

## 2020-04-01 NOTE — PROGRESS NOTE ADULT - SUBJECTIVE AND OBJECTIVE BOX
Patient is a 64y old  Male who presents with a chief complaint of syncope (01 Apr 2020 11:20)      OVERNIGHT EVENTS: none     MEDICATIONS  (STANDING):  amLODIPine   Tablet 5 milliGRAM(s) Oral daily  meropenem  IVPB      pantoprazole    Tablet 40 milliGRAM(s) Oral before breakfast  pyridostigmine 60 milliGRAM(s) Oral three times a day    MEDICATIONS  (PRN):  acetaminophen   Tablet .. 650 milliGRAM(s) Oral every 6 hours PRN Temp greater or equal to 38C (100.4F), Mild Pain (1 - 3)  acetaminophen   Tablet .. 650 milliGRAM(s) Oral every 6 hours PRN Moderate Pain (4 - 6)  ondansetron Injectable 4 milliGRAM(s) IV Push every 6 hours PRN Nausea and/or Vomiting      Allergies    No Known Allergies    Intolerances        T(F): 98.8 (04-01-20 @ 11:14), Max: 99.3 (04-01-20 @ 00:35)  HR: 74 (04-01-20 @ 11:14) (67 - 96)  BP: 132/90 (04-01-20 @ 11:14) (132/90 - 179/91)  RR: 18 (04-01-20 @ 11:14) (17 - 18)  SpO2: 95% (04-01-20 @ 11:14) (93% - 95%)  Wt(kg): --    PHYSICAL EXAM:  GENERAL: NAD  HEAD:  Atraumatic, Normocephalic  EYES: EOMI, PERRLA, conjunctiva and sclera clear  ENMT: Moist mucous membranes  NECK: Supple, No JVD, Normal thyroid  NERVOUS SYSTEM:  Alert & Awake, Motor Strength 4/5 B/L upper and lower extremities;   CHEST/LUNG: Clear to percussion bilaterally; No rales, rhonchi, wheezing, or rubs BL  HEART: Regular rate and rhythm;   ABDOMEN: Soft, Nontender, Nondistended; Bowel sounds present  EXTREMITIES:  2+ Peripheral Pulses, No clubbing, cyanosis, or edema BL LE  SKIN: moist    LABS:                        12.7   9.31  )-----------( 355      ( 01 Apr 2020 07:45 )             38.4     03-31    142  |  111<H>  |  17  ----------------------------<  80  4.3   |  25  |  1.77<H>    Ca    8.1<L>      31 Mar 2020 08:05  Phos  1.9     03-31  Mg     2.0     03-31    TPro  6.7  /  Alb  2.2<L>  /  TBili  0.9  /  DBili  .17  /  AST  74<H>  /  ALT  95<H>  /  AlkPhos  56  04-01        Cultures;   CAPILLARY BLOOD GLUCOSE        Lipid panel:           RADIOLOGY & ADDITIONAL TESTS:    Imaging Personally Reviewed:  [x ] YES    < from: CT Abdomen and Pelvis w/ Oral Cont (03.31.20 @ 20:21) >  Persistent basilar lung opacities.  Small left pleural effusion.  Sigmoid diverticulitis, improved. Decreased size of abscess (1.4 cm), now containing mostly gas.      Consultant(s) Notes Reviewed:  [x ] YES     Care Discussed with [x ] Consultants [X ] Patient [ ] Family  [x ]    [x ]  Other; RN

## 2020-04-01 NOTE — PROGRESS NOTE ADULT - SUBJECTIVE AND OBJECTIVE BOX
Patient is a 64y old  Male who presents with a chief complaint of syncope (31 Mar 2020 12:32)      HPI:  63yo male with pmh of MG presents from PMD office for syncopal episode today. Complains of worsening nausea with lack of appetite and subjective fever/chills x 1 week. States that he was diagnosed with diverticulitis 2 weeks ago and put on abx for it. Went to PMD for worsening symptoms today, felt very dizzy and fainted. States that PMD said he did not hit his head. Denies abdominal pain, vomiting, diarrhea, bloody in stool/black stool, chest pain, palpitations, sob, urinary incontinence and other assocaited sx. (24 Mar 2020 19:35)      INTERVAL HPI/OVERNIGHT EVENTS:  Patient is a poor historian. Nausea while lying down which resolves immediately when he sits up. No other GI symptoms. Eating regular diet w/o any symptoms.    MEDICATIONS  (STANDING):  amLODIPine   Tablet 5 milliGRAM(s) Oral daily  pyridostigmine 60 milliGRAM(s) Oral three times a day    MEDICATIONS  (PRN):  acetaminophen   Tablet .. 650 milliGRAM(s) Oral every 6 hours PRN Temp greater or equal to 38C (100.4F), Mild Pain (1 - 3)  acetaminophen   Tablet .. 650 milliGRAM(s) Oral every 6 hours PRN Moderate Pain (4 - 6)  ondansetron Injectable 4 milliGRAM(s) IV Push every 6 hours PRN Nausea and/or Vomiting      FAMILY HISTORY:      Allergies    No Known Allergies    Intolerances        PMH/PSH:  Eye muscle weakness, right  High cholesterol  Kidney stones  History of appendectomy        REVIEW OF SYSTEMS:  CONSTITUTIONAL: No fever, weight loss, or fatigue  EYES: No eye pain, visual disturbances, or discharge  ENMT:  No difficulty hearing, tinnitus, vertigo; No sinus or throat pain  NECK: No pain or stiffness  BREASTS: No pain, masses, or nipple discharge  RESPIRATORY: No cough, wheezing, chills or hemoptysis; No shortness of breath  CARDIOVASCULAR: No chest pain, palpitations, dizziness, or leg swelling  GASTROINTESTINAL: See above  GENITOURINARY: No dysuria, frequency, hematuria, or incontinence  NEUROLOGICAL: No headaches, numbness, or tremors  SKIN: No itching, burning, rashes, or lesions   LYMPH NODES: No enlarged glands  ENDOCRINE: No heat or cold intolerance; No hair loss  MUSCULOSKELETAL: No joint pain or swelling; No muscle, back, or extremity pain  PSYCHIATRIC: No depression, anxiety, mood swings, or difficulty sleeping  HEME/LYMPH: No easy bruising, or bleeding gums  ALLERGY AND IMMUNOLOGIC: No hives or eczema    Vital Signs Last 24 Hrs  T(C): 37.1 (01 Apr 2020 11:14), Max: 37.4 (01 Apr 2020 00:35)  T(F): 98.8 (01 Apr 2020 11:14), Max: 99.3 (01 Apr 2020 00:35)  HR: 74 (01 Apr 2020 11:14) (67 - 96)  BP: 132/90 (01 Apr 2020 11:14) (132/90 - 179/91)  BP(mean): --  RR: 18 (01 Apr 2020 11:14) (17 - 18)  SpO2: 95% (01 Apr 2020 11:14) (92% - 95%)    PHYSICAL EXAM:  GENERAL: NAD, well-groomed, well-developed  HEAD:  Atraumatic, Normocephalic  EYES: EOMI, PERRLA, conjunctiva and sclera clear  NECK: Supple, No JVD, Normal thyroid  NERVOUS SYSTEM:  Alert & Oriented X3, Good concentration; Motor Strength 5/5 B/L upper and lower extremities;   CHEST/LUNG: Clear to percussion bilaterally; No rales, rhonchi, wheezing, or rubs  HEART: Regular rate and rhythm; No murmurs, rubs, or gallops  ABDOMEN: Soft, Nontender, Nondistended; Bowel sounds present  EXTREMITIES:  2+ Peripheral Pulses, No clubbing, cyanosis, or edema  LYMPH: No lymphadenopathy noted  SKIN: No rashes or lesions    LAB                          12.7   9.31  )-----------( 355      ( 01 Apr 2020 07:45 )             38.4       CBC:  04-01 @ 07:45  WBC 9.31   Hgb 12.7   Hct 38.4   Plts 355  MCV 83.3  03-31 @ 08:05  WBC 11.47   Hgb 12.3   Hct 37.0   Plts 339  MCV 83.3  03-30 @ 09:43  WBC 8.70   Hgb 11.8   Hct 36.4   Plts 339  MCV 84.8  03-27 @ 10:40  WBC 7.23   Hgb 12.0   Hct 36.3   Plts 337  MCV 84.4      Chemistry:  03-31 @ 08:05  Na+ 142  K+ 4.3  Cl- 111  CO2 25  BUN 17  Cr 1.77     03-30 @ 06:39  Na+ 141  K+ 3.9  Cl- 111  CO2 26  BUN 17  Cr 1.81     03-27 @ 10:40  Na+ 138  K+ 3.7  Cl- 106  CO2 26  BUN 22  Cr 2.37         Glucose, Serum: 80 mg/dL (03-31 @ 08:05)  Glucose, Serum: 105 mg/dL (03-30 @ 06:39)  Glucose, Serum: 113 mg/dL (03-27 @ 10:40)      31 Mar 2020 08:05    142    |  111    |  17     ----------------------------<  80     4.3     |  25     |  1.77   30 Mar 2020 06:39    141    |  111    |  17     ----------------------------<  105    3.9     |  26     |  1.81   27 Mar 2020 10:40    138    |  106    |  22     ----------------------------<  113    3.7     |  26     |  2.37     Ca    8.1        31 Mar 2020 08:05  Ca    7.9        30 Mar 2020 06:39  Ca    7.8        27 Mar 2020 10:40  Phos  1.9       31 Mar 2020 08:05  Mg     2.0       31 Mar 2020 08:05    TPro  6.7    /  Alb  2.2    /  TBili  0.9    /  DBili  .17    /  AST  74     /  ALT  95     /  AlkPhos  56     01 Apr 2020 07:45  TPro  6.5    /  Alb  2.0    /  TBili  0.8    /  DBili  x      /  AST  80     /  ALT  111    /  AlkPhos  50     31 Mar 2020 08:05              CAPILLARY BLOOD GLUCOSE          C-Reactive Protein, Serum: 6.35 mg/dL (03-31 @ 10:47)  C-Reactive Protein, Serum: 3.66 mg/dL (03-30 @ 14:29)  C-Reactive Protein, Serum: 11.33 mg/dL (03-27 @ 14:45)  C-Reactive Protein, Serum: 9.27 mg/dL (03-25 @ 14:03)      RADIOLOGY & ADDITIONAL TESTS:    Imaging Personally Reviewed:  [ ] YES  [ ] NO    Consultant(s) Notes Reviewed:  [ ] YES  [ ] NO    Care Discussed with Consultants/Other Providers [ ] YES  [ ] NO

## 2020-04-01 NOTE — PHYSICAL THERAPY INITIAL EVALUATION ADULT - ADDITIONAL COMMENTS
As per patient, he lives in a private house with 4 steps to enter, + rails, 1 flight inside. Patient denies use of assistive equipment prior.

## 2020-04-01 NOTE — PROGRESS NOTE ADULT - SUBJECTIVE AND OBJECTIVE BOX
HPI:  65yo male with pmh of MG presents from PMD office for syncopal episode today. Complains of worsening nausea with lack of appetite and subjective fever/chills x 1 week. States that he was diagnosed with diverticulitis 2 weeks ago and put on abx for it. Went to PMD for worsening symptoms today, felt very dizzy and fainted. States that PMD said he did not hit his head. Denies abdominal pain, vomiting, diarrhea, bloody in stool/black stool, chest pain, palpitations, sob, urinary incontinence and other assocaited sx. (24 Mar 2020 19:35)      Allergies    No Known Allergies    Intolerances        MEDICATIONS  (STANDING):  amLODIPine   Tablet 5 milliGRAM(s) Oral daily  meropenem  IVPB      pantoprazole    Tablet 40 milliGRAM(s) Oral before breakfast  pyridostigmine 60 milliGRAM(s) Oral three times a day    MEDICATIONS  (PRN):  acetaminophen   Tablet .. 650 milliGRAM(s) Oral every 6 hours PRN Temp greater or equal to 38C (100.4F), Mild Pain (1 - 3)  acetaminophen   Tablet .. 650 milliGRAM(s) Oral every 6 hours PRN Moderate Pain (4 - 6)  ondansetron Injectable 4 milliGRAM(s) IV Push every 6 hours PRN Nausea and/or Vomiting      REVIEW OF SYSTEMS:  poor historian   CONSTITUTIONAL: No fever  RESPIRATORY: No cough, wheezing, No shortness of breath  CARDIOVASCULAR: No chest pain, palpitations, dizziness  GASTROINTESTINAL: No abdominal pain. No nausea, vomiting, diarrhea  GENITOURINARY: No dysuria, increase frequency, hematuria, or incontinence  NEUROLOGICAL: No headaches, memory loss  EXTREMITY: No pedal edema BLE  SKIN: No itching, burning, rashes    VITAL SIGNS:  T(C): 37.1 (04-01-20 @ 11:14), Max: 37.4 (04-01-20 @ 00:35)  T(F): 98.8 (04-01-20 @ 11:14), Max: 99.3 (04-01-20 @ 00:35)  HR: 74 (04-01-20 @ 11:14) (67 - 96)  BP: 132/90 (04-01-20 @ 11:14) (132/90 - 179/91)  RR: 18 (04-01-20 @ 11:14) (17 - 18)  SpO2: 95% (04-01-20 @ 11:14) (93% - 95%)  Wt(kg): --    PHYSICAL EXAM:    GENERAL: not in any distress  HEENT: Neck is supple, normocephalic, atraumatic   CHEST/LUNG: Clear to percussion bilaterally; No rales, rhonchi, wheezing  HEART: Regular rate and rhythm; No murmurs, rubs, or gallops  ABDOMEN: Soft, Nontender, Nondistended; Bowel sounds present, no rebound   EXTREMITIES:  2+ Peripheral Pulses, No clubbing, cyanosis, or edema  GENITOURINARY:   SKIN: No rashes or lesions  BACK: no pressor sore   NERVOUS SYSTEM:  Alert & Oriented     LABS:                         12.7   9.31  )-----------( 355      ( 01 Apr 2020 07:45 )             38.4     03-31    142  |  111<H>  |  17  ----------------------------<  80  4.3   |  25  |  1.77<H>    Ca    8.1<L>      31 Mar 2020 08:05  Phos  1.9     03-31  Mg     2.0     03-31    TPro  6.7  /  Alb  2.2<L>  /  TBili  0.9  /  DBili  .17  /  AST  74<H>  /  ALT  95<H>  /  AlkPhos  56  04-01    LIVER FUNCTIONS - ( 01 Apr 2020 07:45 )  Alb: 2.2 g/dL / Pro: 6.7 gm/dL / ALK PHOS: 56 U/L / ALT: 95 U/L / AST: 74 U/L / GGT: x                                                 Radiology:

## 2020-04-01 NOTE — PROGRESS NOTE ADULT - ASSESSMENT
63 yo man with PMH of MG presents from PMD office for syncopal episode   Second episodes of Acute diverticulitis with abscess   PNA r/o aspiration / COVID 19 NEG   CT abd with Acute diverticulitis of the proximal sigmoid colon with suspected intramural abscess measuring 2.8 x 2.0 x 1.7 cm.. Bilateral airspace opacities in the lung bases, right greater than left, suspicious for pneumonia. Findings are somewhat atypical for COVID  s/p zosyn and zithromax   switched to meropenem renally dose   pt is in RA, breathing ok , except fever  fu cultures   repeat blood cultures neg   cc of nausea on exam, no vomitting or abd pain   consider IR aspiration of abscess   advance diet   may need iv latia 1 gm Q 12 hr for about 2 weeks upon discharge if no indication for Aspiration or Sx. I called [facility] ( [phone] ) for the prescription , however it went to voice message and info back to  Michaela.   midline   case discussed with Dr Arias few days ago.

## 2020-04-01 NOTE — PROGRESS NOTE ADULT - ASSESSMENT
65 yo male [facility] MG presents from PMD office for syncopal episode today. Complains of worsening nausea with lack of appetite and subjective fever/chills x 1 week. States that he was diagnosed with diverticulitis 2 weeks ago and put on abx for it. Went to PMD for worsening symptoms today, felt very dizzy and fainted. States that PMD said he did not hit his head. Denies abdominal pain, vomiting, diarrhea, bloody in stool/black stool, chest pain, palpitations, sob, urinary incontinence and other assocaited sx. (24 Mar 2020 19:35)    The patient states he was hospitalized for two days with a diagnosis of diverticulitis last week. He was discharged with two antibiotics. He finished one but the second one was not completed because it made him nauseous. He never did have a recurrence of abdominal pain but was very nauseous.   Patient presented with a syncopal episode.  Also had an episode of diverticulitis last October.  Still feels nauseous today Tolerated clear liquids today. Had a normal colonoscopy 4 years ago.     Diverticulitis - CT scan shows improvement. No abdominal pain. Tolerating regular diet. Unclear why CRP increased to 6.35 WBC better at 9.31 OK from GI point of view for PO antibiotics    Nausea - Secondary to vertigo ?, better today, resolves with sitting upright. 1) anti reflux regiment  2) PPI

## 2020-04-01 NOTE — PROGRESS NOTE ADULT - ASSESSMENT
64 years old male with history of recently treated diverticultiis presents with syncope Doing better would perfer discharge given Covid as patient is improving     Problem/Plan - 1:  ·  Problem: Diverticulitis.  Plan: Meopemen 1 gram every 12 hours.     Also has intramural abscess on CT, evaluated by Surgery and images reviewed by IR, no role for surgical or IR drainage.  Recommend IV abx and Colorectal Eval with Dr. Ibrahim on Friday for ?elective colon resection.   - GI eval appreciated.  MID line insertion d/c planning on iv latia       COVID and RVP is negative.      Problem/Plan - 3:  ·  Problem: High cholesterol.  Plan: dash.     Problem/Plan - 4:  ·  Problem: Syncope.  Plan: has had recurrent Syncope likely in the setting of infection   Echocardiogram reviewed.        Problem/Plan - 5:  ·  Problem: BARBARA from contrast.      -Urine for Eosinophils requested, not generally useful .      Has midline in place, discharge home in AM with IV ABX if CT abdomen stable.

## 2020-04-01 NOTE — DISCHARGE NOTE PROVIDER - HOSPITAL COURSE
65 yo male PMH MG presented to the ED from PMD office for syncopal episode. Complained of worsening nausea with lack of appetite and subjective fever/chills x 1 week. States that he was diagnosed with diverticulitis 2 weeks ago and put on abx for it. Went to PMD for worsening symptoms, felt very dizzy and fainted. States that PMD said he did not hit his head. Denies abdominal pain, vomiting, diarrhea, bloody in stool/black stool, chest pain, palpitations, sob, urinary incontinence and other associated sx. (24 Mar 2020 19:35)        The patient states he was hospitalized for two days with a diagnosis of diverticulitis last week. He was discharged with two antibiotics. He finished one but the second one was not completed because it made him nauseous. He never did have a recurrence of abdominal pain but was very nauseous.          Diverticulitis - CT scan showed improvement. No abdominal pain. Tolerating regular diet.         Nausea - Secondary to vertigo likely, better today, resolved with sitting upright.  Treated with anti reflux regiment and PPI.

## 2020-04-01 NOTE — PHYSICAL THERAPY INITIAL EVALUATION ADULT - PERTINENT HX OF CURRENT PROBLEM, REHAB EVAL
Patient admitted with syncope, found to have diverticulitis and PNA. Patient found to be negative for covid-19.

## 2020-04-01 NOTE — DISCHARGE NOTE PROVIDER - NSDCMRMEDTOKEN_GEN_ALL_CORE_FT
Antivert 12.5 mg oral tablet: 1 tab(s) orally 3 times a day   pyridostigmine 60 mg oral tablet: 1 tab(s) orally 3 times a day amLODIPine 5 mg oral tablet: 1 tab(s) orally once a day  Antivert 12.5 mg oral tablet: 1 tab(s) orally 3 times a day   meropenem: 1 gram(s) intravenous every 12 hours  pantoprazole 40 mg oral delayed release tablet: 1 tab(s) orally once a day (before a meal)  pyridostigmine 60 mg oral tablet: 1 tab(s) orally 3 times a day

## 2020-04-01 NOTE — DISCHARGE NOTE PROVIDER - NSDCCPCAREPLAN_GEN_ALL_CORE_FT
PRINCIPAL DISCHARGE DIAGNOSIS  Diagnosis: Diverticulitis  Assessment and Plan of Treatment: continue home meds      SECONDARY DISCHARGE DIAGNOSES  Diagnosis: CAP (community acquired pneumonia)  Assessment and Plan of Treatment: continue home meds PRINCIPAL DISCHARGE DIAGNOSIS  Diagnosis: Diverticulitis  Assessment and Plan of Treatment: continue home meds  IV meropenem      SECONDARY DISCHARGE DIAGNOSES  Diagnosis: CAP (community acquired pneumonia)  Assessment and Plan of Treatment: continue home meds

## 2020-04-02 ENCOUNTER — TRANSCRIPTION ENCOUNTER (OUTPATIENT)
Age: 65
End: 2020-04-02

## 2020-04-02 VITALS
TEMPERATURE: 98 F | OXYGEN SATURATION: 99 % | RESPIRATION RATE: 18 BRPM | HEART RATE: 71 BPM | SYSTOLIC BLOOD PRESSURE: 140 MMHG | DIASTOLIC BLOOD PRESSURE: 77 MMHG

## 2020-04-02 PROCEDURE — 99239 HOSP IP/OBS DSCHRG MGMT >30: CPT

## 2020-04-02 RX ORDER — PANTOPRAZOLE SODIUM 20 MG/1
1 TABLET, DELAYED RELEASE ORAL
Qty: 30 | Refills: 0
Start: 2020-04-02 | End: 2020-05-01

## 2020-04-02 RX ORDER — AMLODIPINE BESYLATE 2.5 MG/1
1 TABLET ORAL
Qty: 30 | Refills: 0
Start: 2020-04-02 | End: 2020-05-01

## 2020-04-02 RX ADMIN — PYRIDOSTIGMINE BROMIDE 60 MILLIGRAM(S): 60 SOLUTION ORAL at 08:35

## 2020-04-02 RX ADMIN — AMLODIPINE BESYLATE 5 MILLIGRAM(S): 2.5 TABLET ORAL at 05:23

## 2020-04-02 RX ADMIN — MEROPENEM 100 MILLIGRAM(S): 1 INJECTION INTRAVENOUS at 05:23

## 2020-04-02 RX ADMIN — PANTOPRAZOLE SODIUM 40 MILLIGRAM(S): 20 TABLET, DELAYED RELEASE ORAL at 05:23

## 2020-04-02 NOTE — DISCHARGE NOTE NURSING/CASE MANAGEMENT/SOCIAL WORK - PATIENT PORTAL LINK FT
You can access the FollowMyHealth Patient Portal offered by [facility] by registering at the following website: [email or web address]. By joining [employer]’s FollowMyHealth portal, you will also be able to view your health information using other applications (apps) compatible with our system.

## 2020-04-02 NOTE — PROGRESS NOTE ADULT - PROVIDER SPECIALTY LIST ADULT
Gastroenterology
Hospitalist
Infectious Disease
Orthopedics
Surgery
Surgery
Hospitalist

## 2020-04-02 NOTE — PROGRESS NOTE ADULT - ASSESSMENT
63 yo male [facility] MG presents from PMD office for syncopal episode today. Complains of worsening nausea with lack of appetite and subjective fever/chills x 1 week. States that he was diagnosed with diverticulitis 2 weeks ago and put on abx for it. Went to PMD for worsening symptoms today, felt very dizzy and fainted. States that PMD said he did not hit his head. Denies abdominal pain, vomiting, diarrhea, bloody in stool/black stool, chest pain, palpitations, sob, urinary incontinence and other assocaited sx. (24 Mar 2020 19:35)    The patient states he was hospitalized for two days with a diagnosis of diverticulitis last week. He was discharged with two antibiotics. He finished one but the second one was not completed because it made him nauseous. He never did have a recurrence of abdominal pain but was very nauseous.   Patient presented with a syncopal episode.  Also had an episode of diverticulitis last October.  Still feels nauseous today Tolerated clear liquids today. Had a normal colonoscopy 4 years ago.     Diverticulitis - CT scan shows improvement. No abdominal pain. Tolerating regular diet. Unclear why CRP increased to 6.35 WBC better at 9.31 OK from GI point of view for PO antibiotics    Nausea - Secondary to vertigo ?, again better today, resolves with sitting upright. 1) anti reflux regiment  2) PPI

## 2020-04-02 NOTE — PROGRESS NOTE ADULT - REASON FOR ADMISSION
syncope

## 2020-04-02 NOTE — PROGRESS NOTE ADULT - SUBJECTIVE AND OBJECTIVE BOX
Patient is a 64y old  Male who presents with a chief complaint of syncope (01 Apr 2020 15:27)      HPI:  63yo male with pmh of MG presents from PMD office for syncopal episode today. Complains of worsening nausea with lack of appetite and subjective fever/chills x 1 week. States that he was diagnosed with diverticulitis 2 weeks ago and put on abx for it. Went to PMD for worsening symptoms today, felt very dizzy and fainted. States that PMD said he did not hit his head. Denies abdominal pain, vomiting, diarrhea, bloody in stool/black stool, chest pain, palpitations, sob, urinary incontinence and other assocaited sx. (24 Mar 2020 19:35)      INTERVAL HPI/OVERNIGHT EVENTS:  Minimal nasuea ( better than yesterday ). The patient denies melena, hematochezia, hematemesis, vomiting, abdominal pain, constipation, diarrhea, or change in bowel movements  Tolerating regular diet.     MEDICATIONS  (STANDING):  amLODIPine   Tablet 5 milliGRAM(s) Oral daily  meropenem  IVPB      meropenem  IVPB 1000 milliGRAM(s) IV Intermittent every 12 hours  pantoprazole    Tablet 40 milliGRAM(s) Oral before breakfast  pyridostigmine 60 milliGRAM(s) Oral three times a day    MEDICATIONS  (PRN):  acetaminophen   Tablet .. 650 milliGRAM(s) Oral every 6 hours PRN Temp greater or equal to 38C (100.4F), Mild Pain (1 - 3)  acetaminophen   Tablet .. 650 milliGRAM(s) Oral every 6 hours PRN Moderate Pain (4 - 6)  ondansetron Injectable 4 milliGRAM(s) IV Push every 6 hours PRN Nausea and/or Vomiting      FAMILY HISTORY:      Allergies    No Known Allergies    Intolerances        PMH/PSH:  Eye muscle weakness, right  High cholesterol  Kidney stones  History of appendectomy        REVIEW OF SYSTEMS:  CONSTITUTIONAL: No fever, weight loss,   EYES: No eye pain, visual disturbances, or discharge  ENMT:  No difficulty hearing, tinnitus, vertigo; No sinus or throat pain  NECK: No pain or stiffness  BREASTS: No pain, masses, or nipple discharge  RESPIRATORY: No cough, wheezing, chills or hemoptysis; No shortness of breath  CARDIOVASCULAR: No chest pain, palpitations, dizziness, or leg swelling  GASTROINTESTINAL: See above  GENITOURINARY: No dysuria, frequency, hematuria, or incontinence  NEUROLOGICAL: No headaches, memory loss, loss of strength, numbness, or tremors  SKIN: No itching, burning, rashes, or lesions   LYMPH NODES: No enlarged glands  ENDOCRINE: No heat or cold intolerance; No hair loss  MUSCULOSKELETAL: No joint pain or swelling; No muscle, back, or extremity pain  PSYCHIATRIC: No depression, anxiety, mood swings, or difficulty sleeping  HEME/LYMPH: No easy bruising, or bleeding gums  ALLERGY AND IMMUNOLOGIC: No hives or eczema    Vital Signs Last 24 Hrs  T(C): 37.1 (02 Apr 2020 11:17), Max: 37.6 (02 Apr 2020 00:55)  T(F): 98.8 (02 Apr 2020 11:17), Max: 99.6 (02 Apr 2020 00:55)  HR: 96 (02 Apr 2020 11:17) (66 - 96)  BP: 158/88 (02 Apr 2020 11:17) (158/88 - 173/93)  BP(mean): --  RR: 19 (02 Apr 2020 11:17) (19 - 24)  SpO2: 98% (02 Apr 2020 11:17) (95% - 98%)    PHYSICAL EXAM:  GENERAL: NAD, well-groomed, well-developed  HEAD:  Atraumatic, Normocephalic  EYES: EOMI, PERRLA, conjunctiva and sclera clear  NECK: Supple, No JVD, Normal thyroid  NERVOUS SYSTEM:  Alert & Oriented X3, Good concentration; Motor Strength 5/5 B/L upper and lower extremities;   CHEST/LUNG: Clear to percussion bilaterally; No rales, rhonchi, wheezing, or rubs  HEART: Regular rate and rhythm; No murmurs, rubs, or gallops  ABDOMEN: Soft, Nontender, Nondistended; Bowel sounds present  EXTREMITIES:  2+ Peripheral Pulses, No clubbing, cyanosis, or edema  LYMPH: No lymphadenopathy noted  SKIN: No rashes or lesions    LAB                          12.7   9.31  )-----------( 355      ( 01 Apr 2020 07:45 )             38.4       CBC:  04-01 @ 07:45  WBC 9.31   Hgb 12.7   Hct 38.4   Plts 355  MCV 83.3  03-31 @ 08:05  WBC 11.47   Hgb 12.3   Hct 37.0   Plts 339  MCV 83.3  03-30 @ 09:43  WBC 8.70   Hgb 11.8   Hct 36.4   Plts 339  MCV 84.8  03-27 @ 10:40  WBC 7.23   Hgb 12.0   Hct 36.3   Plts 337  MCV 84.4      Chemistry:  03-31 @ 08:05  Na+ 142  K+ 4.3  Cl- 111  CO2 25  BUN 17  Cr 1.77     03-30 @ 06:39  Na+ 141  K+ 3.9  Cl- 111  CO2 26  BUN 17  Cr 1.81     03-27 @ 10:40  Na+ 138  K+ 3.7  Cl- 106  CO2 26  BUN 22  Cr 2.37         Glucose, Serum: 80 mg/dL (03-31 @ 08:05)  Glucose, Serum: 105 mg/dL (03-30 @ 06:39)  Glucose, Serum: 113 mg/dL (03-27 @ 10:40)      31 Mar 2020 08:05    142    |  111    |  17     ----------------------------<  80     4.3     |  25     |  1.77   30 Mar 2020 06:39    141    |  111    |  17     ----------------------------<  105    3.9     |  26     |  1.81   27 Mar 2020 10:40    138    |  106    |  22     ----------------------------<  113    3.7     |  26     |  2.37     Ca    8.1        31 Mar 2020 08:05  Ca    7.9        30 Mar 2020 06:39  Ca    7.8        27 Mar 2020 10:40  Phos  1.9       31 Mar 2020 08:05  Mg     2.0       31 Mar 2020 08:05    TPro  6.7    /  Alb  2.2    /  TBili  0.9    /  DBili  .17    /  AST  74     /  ALT  95     /  AlkPhos  56     01 Apr 2020 07:45  TPro  6.5    /  Alb  2.0    /  TBili  0.8    /  DBili  x      /  AST  80     /  ALT  111    /  AlkPhos  50     31 Mar 2020 08:05              CAPILLARY BLOOD GLUCOSE          C-Reactive Protein, Serum: 6.35 mg/dL (03-31 @ 10:47)  C-Reactive Protein, Serum: 3.66 mg/dL (03-30 @ 14:29)  C-Reactive Protein, Serum: 11.33 mg/dL (03-27 @ 14:45)      RADIOLOGY & ADDITIONAL TESTS:    Imaging Personally Reviewed:  [ ] YES  [ ] NO    Consultant(s) Notes Reviewed:  [ ] YES  [ ] NO    Care Discussed with Consultants/Other Providers [ ] YES  [ ] NO

## 2020-04-06 DIAGNOSIS — J18.9 PNEUMONIA, UNSPECIFIED ORGANISM: ICD-10-CM

## 2020-04-06 DIAGNOSIS — N17.9 ACUTE KIDNEY FAILURE, UNSPECIFIED: ICD-10-CM

## 2020-04-06 DIAGNOSIS — Z03.818 ENCOUNTER FOR OBSERVATION FOR SUSPECTED EXPOSURE TO OTHER BIOLOGICAL AGENTS RULED OUT: ICD-10-CM

## 2020-04-06 DIAGNOSIS — K57.20 DIVERTICULITIS OF LARGE INTESTINE WITH PERFORATION AND ABSCESS WITHOUT BLEEDING: ICD-10-CM

## 2020-04-06 DIAGNOSIS — R55 SYNCOPE AND COLLAPSE: ICD-10-CM

## 2020-04-06 DIAGNOSIS — E78.00 PURE HYPERCHOLESTEROLEMIA, UNSPECIFIED: ICD-10-CM

## 2020-04-06 DIAGNOSIS — G70.00 MYASTHENIA GRAVIS WITHOUT (ACUTE) EXACERBATION: ICD-10-CM

## 2020-04-06 DIAGNOSIS — R42 DIZZINESS AND GIDDINESS: ICD-10-CM

## 2020-05-04 PROBLEM — Z00.00 ENCOUNTER FOR PREVENTIVE HEALTH EXAMINATION: Status: ACTIVE | Noted: 2020-05-04

## 2020-05-15 ENCOUNTER — APPOINTMENT (OUTPATIENT)
Dept: COLORECTAL SURGERY | Facility: CLINIC | Age: 65
End: 2020-05-15
Payer: COMMERCIAL

## 2020-05-15 VITALS
DIASTOLIC BLOOD PRESSURE: 84 MMHG | BODY MASS INDEX: 25.92 KG/M2 | RESPIRATION RATE: 14 BRPM | SYSTOLIC BLOOD PRESSURE: 166 MMHG | HEART RATE: 70 BPM | HEIGHT: 69 IN | WEIGHT: 175 LBS

## 2020-05-15 DIAGNOSIS — Z72.89 OTHER PROBLEMS RELATED TO LIFESTYLE: ICD-10-CM

## 2020-05-15 DIAGNOSIS — Z86.39 PERSONAL HISTORY OF OTHER ENDOCRINE, NUTRITIONAL AND METABOLIC DISEASE: ICD-10-CM

## 2020-05-15 DIAGNOSIS — Z87.898 PERSONAL HISTORY OF OTHER SPECIFIED CONDITIONS: ICD-10-CM

## 2020-05-15 PROCEDURE — 99214 OFFICE O/P EST MOD 30 MIN: CPT

## 2020-05-15 RX ORDER — MECLIZINE HYDROCHLORIDE 25 MG/1
25 TABLET ORAL
Refills: 0 | Status: ACTIVE | COMMUNITY

## 2020-05-15 RX ORDER — PYRIDOSTIGMINE BROMIDE 30 MG/1
TABLET ORAL
Refills: 0 | Status: ACTIVE | COMMUNITY

## 2020-05-15 NOTE — DATA REVIEWED
[FreeTextEntry1] : 3/24/2020 CT abd pelvis\par \par Acute diverticulitis of the proximal sigmoid colon with suspected \par intramural abscess measuring 2.8 x 2.0 x 1.7 cm. \par Bilateral airspace opacities in the lung bases, right greater than left, \par suspicious for pneumonia. Findings are somewhat atypical for COVID, however, \par correlation with laboratory testing is recommended.

## 2020-05-15 NOTE — REVIEW OF SYSTEMS
[As Noted in HPI] : as noted in HPI [Negative] : Endocrine [Lower Ext Edema] : no extremity edema [Palpitations] : no palpitations [Chest Pain] : no chest pain [Shortness Of Breath] : no shortness of breath [Easy Bleeding] : no tendency for easy bleeding [de-identified] : occasional lightheadedness. [Easy Bruising] : no tendency for easy bruising

## 2020-05-15 NOTE — CONSULT LETTER
[Dear  ___] : Dear  [unfilled], [Please see my note below.] : Please see my note below. [Courtesy Letter:] : I had the pleasure of seeing your patient, [unfilled], in my office today. [Sincerely,] : Sincerely, [FreeTextEntry3] : Ryan Martinez MD, FACS, FASCRS\par Colorectal Surgery\par [facility]\par Assistant Professor of Surgery [employer] [facility]\par [address] [place] [address], [address]\[place] [address]\par Tel: [phone] \par Cell: [phone] \par Email: [email or web address]\par

## 2020-05-15 NOTE — ASSESSMENT
[FreeTextEntry1] : Diverticulitis\par The patient has had 2 episodes within 6 months, the most recent requiring prolonged IV abx via PICC. He is offered observation vs elective resection. He is advised that if he has a recurrent episode with perforation he may require emergent surgery with possible ostomy. \par He would like to pursue elective surgery. He is offered ERP robotic/laparoscopic, possible open, LAR with ureteral caths if possible were. R/B/A were d/w him including but not limited to pain, bleeding, infection, anastomotic leak, ileus, wound complications, MI, stroke, DVT/PE, and death. He would require medical clearance from his PCP prior to surgery. \par Due to COVID 19 I am currently unable to schedule elective surgery. As soon as this restriction is lifted by [facility] he will be contacted to schedule. \par \par The following has been addressed with the patient during their preop visit. \par \par o	Antibiotics ordered\par o	Fasting times reviewed\par o	Bowel prep instructions given\par o	Oral carbohydrate given\par o	Goals for nutrition intake/ diet information given\par o	Pain management information given\par o	Discharge criteria & expected hospital stay\par \par

## 2020-05-15 NOTE — HISTORY OF PRESENT ILLNESS
[FreeTextEntry1] : 63yo [place] male, poor historian, hospitalized with initial bout of diverticulitis in Oct 2019 in [place].  Recd IV ABX and discharged on oral ABX. March 2020 developed lower abdominal pain/nausea.  Seen by PCP and had syncopal episode therefore transferred to [facility] via ambulance.\par \par MRI head without acute pathology. CT A&P revealed diverticulitis with 2x3cm intramural abscess.  Treated with IV ABX and discharged w/PICC for IV ABX x2weeks. He has since had his PICC removed. Presently denies abdominal pain/fever. +nausea (takes Meclizine) Daily BM. Maintaining LRD.\par \par Last colonoscopy @3yrs ago but does not know by who or where

## 2020-05-15 NOTE — PHYSICAL EXAM
[Normal Breath Sounds] : Normal breath sounds [Normal Heart Sounds] : normal heart sounds [No Edema] : No edema [Alert] : alert [Normal Rate and Rhythm] : normal rate and rhythm [Oriented to Person] : oriented to person [Oriented to Place] : oriented to place [Calm] : calm [Oriented to Time] : oriented to time [de-identified] : round soft +BS NT/ND RLQ scar [de-identified] : NC/AT [de-identified] : +ROM [de-identified] : intact

## 2020-07-31 ENCOUNTER — OUTPATIENT (OUTPATIENT)
Dept: OUTPATIENT SERVICES | Facility: HOSPITAL | Age: 65
LOS: 1 days | End: 2020-07-31
Payer: COMMERCIAL

## 2020-07-31 VITALS
DIASTOLIC BLOOD PRESSURE: 80 MMHG | WEIGHT: 171.08 LBS | HEART RATE: 59 BPM | TEMPERATURE: 97 F | RESPIRATION RATE: 16 BRPM | SYSTOLIC BLOOD PRESSURE: 162 MMHG | OXYGEN SATURATION: 98 % | HEIGHT: 69 IN

## 2020-07-31 DIAGNOSIS — K57.92 DIVERTICULITIS OF INTESTINE, PART UNSPECIFIED, WITHOUT PERFORATION OR ABSCESS WITHOUT BLEEDING: ICD-10-CM

## 2020-07-31 DIAGNOSIS — Z29.9 ENCOUNTER FOR PROPHYLACTIC MEASURES, UNSPECIFIED: ICD-10-CM

## 2020-07-31 DIAGNOSIS — Z86.69 PERSONAL HISTORY OF OTHER DISEASES OF THE NERVOUS SYSTEM AND SENSE ORGANS: ICD-10-CM

## 2020-07-31 DIAGNOSIS — Z98.89 OTHER SPECIFIED POSTPROCEDURAL STATES: Chronic | ICD-10-CM

## 2020-07-31 DIAGNOSIS — Z01.818 ENCOUNTER FOR OTHER PREPROCEDURAL EXAMINATION: ICD-10-CM

## 2020-07-31 DIAGNOSIS — K57.32 DIVERTICULITIS OF LARGE INTESTINE WITHOUT PERFORATION OR ABSCESS WITHOUT BLEEDING: ICD-10-CM

## 2020-07-31 LAB
A1C WITH ESTIMATED AVERAGE GLUCOSE RESULT: 5.1 % — SIGNIFICANT CHANGE UP (ref 4–5.6)
ANION GAP SERPL CALC-SCNC: 15 MMOL/L — SIGNIFICANT CHANGE UP (ref 5–17)
BLD GP AB SCN SERPL QL: NEGATIVE — SIGNIFICANT CHANGE UP
BUN SERPL-MCNC: 14 MG/DL — SIGNIFICANT CHANGE UP (ref 7–23)
CALCIUM SERPL-MCNC: 9.6 MG/DL — SIGNIFICANT CHANGE UP (ref 8.4–10.5)
CHLORIDE SERPL-SCNC: 105 MMOL/L — SIGNIFICANT CHANGE UP (ref 96–108)
CO2 SERPL-SCNC: 23 MMOL/L — SIGNIFICANT CHANGE UP (ref 22–31)
CREAT SERPL-MCNC: 1.36 MG/DL — HIGH (ref 0.5–1.3)
ESTIMATED AVERAGE GLUCOSE: 100 MG/DL — SIGNIFICANT CHANGE UP (ref 68–114)
GLUCOSE SERPL-MCNC: 102 MG/DL — HIGH (ref 70–99)
HCT VFR BLD CALC: 42.5 % — SIGNIFICANT CHANGE UP (ref 39–50)
HGB BLD-MCNC: 13.5 G/DL — SIGNIFICANT CHANGE UP (ref 13–17)
MCHC RBC-ENTMCNC: 27.8 PG — SIGNIFICANT CHANGE UP (ref 27–34)
MCHC RBC-ENTMCNC: 31.8 GM/DL — LOW (ref 32–36)
MCV RBC AUTO: 87.6 FL — SIGNIFICANT CHANGE UP (ref 80–100)
NRBC # BLD: 0 /100 WBCS — SIGNIFICANT CHANGE UP (ref 0–0)
PLATELET # BLD AUTO: 251 K/UL — SIGNIFICANT CHANGE UP (ref 150–400)
POTASSIUM SERPL-MCNC: 3.7 MMOL/L — SIGNIFICANT CHANGE UP (ref 3.5–5.3)
POTASSIUM SERPL-SCNC: 3.7 MMOL/L — SIGNIFICANT CHANGE UP (ref 3.5–5.3)
RBC # BLD: 4.85 M/UL — SIGNIFICANT CHANGE UP (ref 4.2–5.8)
RBC # FLD: 13.2 % — SIGNIFICANT CHANGE UP (ref 10.3–14.5)
RH IG SCN BLD-IMP: POSITIVE — SIGNIFICANT CHANGE UP
SODIUM SERPL-SCNC: 143 MMOL/L — SIGNIFICANT CHANGE UP (ref 135–145)
WBC # BLD: 4.76 K/UL — SIGNIFICANT CHANGE UP (ref 3.8–10.5)
WBC # FLD AUTO: 4.76 K/UL — SIGNIFICANT CHANGE UP (ref 3.8–10.5)

## 2020-07-31 PROCEDURE — 85027 COMPLETE CBC AUTOMATED: CPT

## 2020-07-31 PROCEDURE — 86901 BLOOD TYPING SEROLOGIC RH(D): CPT

## 2020-07-31 PROCEDURE — 86900 BLOOD TYPING SEROLOGIC ABO: CPT

## 2020-07-31 PROCEDURE — 86850 RBC ANTIBODY SCREEN: CPT

## 2020-07-31 PROCEDURE — 83036 HEMOGLOBIN GLYCOSYLATED A1C: CPT

## 2020-07-31 PROCEDURE — 87086 URINE CULTURE/COLONY COUNT: CPT

## 2020-07-31 PROCEDURE — G0463: CPT

## 2020-07-31 PROCEDURE — 80048 BASIC METABOLIC PNL TOTAL CA: CPT

## 2020-07-31 RX ORDER — CEFOTETAN DISODIUM 1 G
2 VIAL (EA) INJECTION ONCE
Refills: 0 | Status: DISCONTINUED | OUTPATIENT
Start: 2020-08-10 | End: 2020-08-10

## 2020-07-31 RX ORDER — PYRIDOSTIGMINE BROMIDE 60 MG/5ML
1 SOLUTION ORAL
Qty: 0 | Refills: 0 | DISCHARGE

## 2020-07-31 NOTE — H&P PST ADULT - ASSESSMENT
1.	Diverticulitis  2.	Myasthenia Gravis  CAPRINI VTE 2.0 SCORE [CLOT updated 2019]    AGE RELATED RISK FACTORS                                                       MOBILITY RELATED FACTORS  [ ] Age 41-60 years                                            (1 Point)                    [ ] Bed rest                                                        (1 Point)  [x ] Age: 61-74 years                                           (2 Points)                  [ ] Plaster cast                                                   (2 Points)  [ ] Age= 75 years                                              (3 Points)                    [ ] Bed bound for more than 72 hours                 (2 Points)    DISEASE RELATED RISK FACTORS                                               GENDER SPECIFIC FACTORS  [ ] Edema in the lower extremities                       (1 Point)              [ ] Pregnancy                                                     (1 Point)  [ ] Varicose veins                                               (1 Point)                     [ ] Post-partum < 6 weeks                                   (1 Point)             [ ] BMI > 25 Kg/m2                                            (1 Point)                     [ ] Hormonal therapy  or oral contraception          (1 Point)                 [ ] Sepsis (in the previous month)                        (1 Point)               [ ] History of pregnancy complications                 (1 point)  [ ] Pneumonia or serious lung disease                                               [ ] Unexplained or recurrent                     (1 Point)           (in the previous month)                               (1 Point)  [ ] Abnormal pulmonary function test                     (1 Point)                 SURGERY RELATED RISK FACTORS  [ ] Acute myocardial infarction                              (1 Point)               [ ]  Section                                             (1 Point)  [ ] Congestive heart failure (in the previous month)  (1 Point)      [ ] Minor surgery                                                  (1 Point)   [ ] Inflammatory bowel disease                             (1 Point)               [ ] Arthroscopic surgery                                        (2 Points)  [ ] Central venous access                                      (2 Points)                [ x] General surgery lasting more than 45 minutes (2 points)  [ ] Malignancy- Present or previous                   (2 Points)                [ ] Elective arthroplasty                                         (5 points)    [ ] Stroke (in the previous month)                          (5 Points)                                                                                                                                                           HEMATOLOGY RELATED FACTORS                                                 TRAUMA RELATED RISK FACTORS  [ ] Prior episodes of VTE                                     (3 Points)                [ ] Fracture of the hip, pelvis, or leg                       (5 Points)  [ ] Positive family history for VTE                         (3 Points)             [ ] Acute spinal cord injury (in the previous month)  (5 Points)  [ ] Prothrombin 41893 A                                     (3 Points)               [ ] Paralysis  (less than 1 month)                             (5 Points)  [ ] Factor V Leiden                                             (3 Points)                  [ ] Multiple Trauma within 1 month                        (5 Points)  [ ] Lupus anticoagulants                                     (3 Points)                                                           [ ] Anticardiolipin antibodies                               (3 Points)                                                       [ ] High homocysteine in the blood                      (3 Points)                                             [ ] Other congenital or acquired thrombophilia      (3 Points)                                                [ ] Heparin induced thrombocytopenia                  (3 Points)                                     Total Score [      4    ]

## 2020-07-31 NOTE — H&P PST ADULT - HISTORY OF PRESENT ILLNESS
64 yo male with h/o Myasthenia Gravis and vertigo, s/p two recent bouts of diverticulitis. The first episode occurred 10/2019 (Chandrika) and the most recent was 3/2020 (Gregory).  Pt is scheduled for ERAS and Robotic Low Anterior Resection on 8/10/2020.    Pt is scheduled for Covid-19 PCR on 8/7/2020 at [facility] site.

## 2020-07-31 NOTE — H&P PST ADULT - NSICDXPASTMEDICALHX_GEN_ALL_CORE_FT
PAST MEDICAL HISTORY:  Diverticulitis     Eye muscle weakness, right     H/O myasthenia gravis diagnosed 3 years ago    High cholesterol     Kidney stones PAST MEDICAL HISTORY:  Diverticulitis 10/2019, 3/2020    Eye muscle weakness, right     H/O myasthenia gravis diagnosed 3 years ago - on pyridostigmine    High cholesterol     Kidney stones

## 2020-07-31 NOTE — H&P PST ADULT - NSICDXPROBLEM_GEN_ALL_CORE_FT
PROBLEM DIAGNOSES  Problem: Diverticulitis  Assessment and Plan: ERAS  Robotic Low Anterior Resection  Stat FS on admission    Problem: H/O myasthenia gravis  Assessment and Plan: Discussed with Anesthesia  Pt instructed to take regular pyrostigmine dose on am of surgery with minimal water and bring meds with him on day of surgery    Problem: Need for prophylactic measure  Assessment and Plan: The Caprini score indicates this patient is at risk for a VTE event (score 3-5).  Most surgical patients in this group would benefit from pharmacologic prophylaxis.  The surgical team will determine the balance between VTE risk and bleeding risk

## 2020-07-31 NOTE — H&P PST ADULT - ANESTHESIA, PREVIOUS REACTION, PROFILE
none/denies family history of anesthesia complications none/denies personal/family history of anesthesia complications

## 2020-08-02 LAB
CULTURE RESULTS: NO GROWTH — SIGNIFICANT CHANGE UP
SPECIMEN SOURCE: SIGNIFICANT CHANGE UP

## 2020-08-05 DIAGNOSIS — Z01.818 ENCOUNTER FOR OTHER PREPROCEDURAL EXAMINATION: ICD-10-CM

## 2020-08-07 ENCOUNTER — APPOINTMENT (OUTPATIENT)
Dept: DISASTER EMERGENCY | Facility: CLINIC | Age: 65
End: 2020-08-07

## 2020-08-08 LAB — SARS-COV-2 N GENE NPH QL NAA+PROBE: NOT DETECTED

## 2020-08-09 ENCOUNTER — TRANSCRIPTION ENCOUNTER (OUTPATIENT)
Age: 65
End: 2020-08-09

## 2020-08-10 ENCOUNTER — APPOINTMENT (OUTPATIENT)
Dept: COLORECTAL SURGERY | Facility: HOSPITAL | Age: 65
End: 2020-08-10
Payer: COMMERCIAL

## 2020-08-10 ENCOUNTER — INPATIENT (INPATIENT)
Facility: HOSPITAL | Age: 65
LOS: 8 days | Discharge: HOME | DRG: 329 | End: 2020-08-19
Attending: SURGERY | Admitting: SURGERY
Payer: COMMERCIAL

## 2020-08-10 ENCOUNTER — RESULT REVIEW (OUTPATIENT)
Age: 65
End: 2020-08-10

## 2020-08-10 VITALS
RESPIRATION RATE: 18 BRPM | TEMPERATURE: 98 F | SYSTOLIC BLOOD PRESSURE: 141 MMHG | DIASTOLIC BLOOD PRESSURE: 77 MMHG | OXYGEN SATURATION: 97 % | WEIGHT: 171.08 LBS | HEART RATE: 66 BPM | HEIGHT: 68.9 IN

## 2020-08-10 DIAGNOSIS — Z98.89 OTHER SPECIFIED POSTPROCEDURAL STATES: Chronic | ICD-10-CM

## 2020-08-10 DIAGNOSIS — K57.32 DIVERTICULITIS OF LARGE INTESTINE WITHOUT PERFORATION OR ABSCESS WITHOUT BLEEDING: ICD-10-CM

## 2020-08-10 LAB
GLUCOSE BLDC GLUCOMTR-MCNC: 85 MG/DL — SIGNIFICANT CHANGE UP (ref 70–99)
RH IG SCN BLD-IMP: POSITIVE — SIGNIFICANT CHANGE UP

## 2020-08-10 PROCEDURE — 88307 TISSUE EXAM BY PATHOLOGIST: CPT | Mod: 26

## 2020-08-10 PROCEDURE — 44213 LAP MOBIL SPLENIC FL ADD-ON: CPT

## 2020-08-10 PROCEDURE — S2900 ROBOTIC SURGICAL SYSTEM: CPT | Mod: NC

## 2020-08-10 PROCEDURE — 44207 L COLECTOMY/COLOPROCTOSTOMY: CPT

## 2020-08-10 PROCEDURE — 45330 DIAGNOSTIC SIGMOIDOSCOPY: CPT

## 2020-08-10 RX ORDER — MORPHINE SULFATE 50 MG/1
0.1 CAPSULE, EXTENDED RELEASE ORAL ONCE
Refills: 0 | Status: DISCONTINUED | OUTPATIENT
Start: 2020-08-10 | End: 2020-08-11

## 2020-08-10 RX ORDER — SODIUM CHLORIDE 9 MG/ML
1000 INJECTION, SOLUTION INTRAVENOUS
Refills: 0 | Status: DISCONTINUED | OUTPATIENT
Start: 2020-08-10 | End: 2020-08-10

## 2020-08-10 RX ORDER — PYRIDOSTIGMINE BROMIDE 60 MG/5ML
60 SOLUTION ORAL
Refills: 0 | Status: DISCONTINUED | OUTPATIENT
Start: 2020-08-10 | End: 2020-08-13

## 2020-08-10 RX ORDER — PYRIDOSTIGMINE BROMIDE 60 MG/5ML
180 SOLUTION ORAL AT BEDTIME
Refills: 0 | Status: DISCONTINUED | OUTPATIENT
Start: 2020-08-10 | End: 2020-08-13

## 2020-08-10 RX ORDER — CELECOXIB 200 MG/1
400 CAPSULE ORAL ONCE
Refills: 0 | Status: COMPLETED | OUTPATIENT
Start: 2020-08-10 | End: 2020-08-10

## 2020-08-10 RX ORDER — ONDANSETRON 8 MG/1
4 TABLET, FILM COATED ORAL EVERY 6 HOURS
Refills: 0 | Status: DISCONTINUED | OUTPATIENT
Start: 2020-08-10 | End: 2020-08-13

## 2020-08-10 RX ORDER — OXYCODONE HYDROCHLORIDE 5 MG/1
10 TABLET ORAL EVERY 4 HOURS
Refills: 0 | Status: DISCONTINUED | OUTPATIENT
Start: 2020-08-10 | End: 2020-08-13

## 2020-08-10 RX ORDER — ACETAMINOPHEN 500 MG
1000 TABLET ORAL EVERY 6 HOURS
Refills: 0 | Status: COMPLETED | OUTPATIENT
Start: 2020-08-10 | End: 2020-08-11

## 2020-08-10 RX ORDER — SODIUM CHLORIDE 9 MG/ML
1000 INJECTION, SOLUTION INTRAVENOUS
Refills: 0 | Status: DISCONTINUED | OUTPATIENT
Start: 2020-08-10 | End: 2020-08-11

## 2020-08-10 RX ORDER — ACETAMINOPHEN 500 MG
975 TABLET ORAL EVERY 6 HOURS
Refills: 0 | Status: DISCONTINUED | OUTPATIENT
Start: 2020-08-12 | End: 2020-08-13

## 2020-08-10 RX ORDER — LABETALOL HCL 100 MG
5 TABLET ORAL ONCE
Refills: 0 | Status: COMPLETED | OUTPATIENT
Start: 2020-08-10 | End: 2020-08-10

## 2020-08-10 RX ORDER — GABAPENTIN 400 MG/1
600 CAPSULE ORAL ONCE
Refills: 0 | Status: COMPLETED | OUTPATIENT
Start: 2020-08-10 | End: 2020-08-10

## 2020-08-10 RX ORDER — OXYCODONE HYDROCHLORIDE 5 MG/1
5 TABLET ORAL EVERY 4 HOURS
Refills: 0 | Status: DISCONTINUED | OUTPATIENT
Start: 2020-08-10 | End: 2020-08-13

## 2020-08-10 RX ORDER — HYDRALAZINE HCL 50 MG
10 TABLET ORAL ONCE
Refills: 0 | Status: COMPLETED | OUTPATIENT
Start: 2020-08-10 | End: 2020-08-10

## 2020-08-10 RX ORDER — CHLORHEXIDINE GLUCONATE 213 G/1000ML
1 SOLUTION TOPICAL ONCE
Refills: 0 | Status: DISCONTINUED | OUTPATIENT
Start: 2020-08-10 | End: 2020-08-10

## 2020-08-10 RX ORDER — LIDOCAINE HCL 20 MG/ML
0.2 VIAL (ML) INJECTION ONCE
Refills: 0 | Status: DISCONTINUED | OUTPATIENT
Start: 2020-08-10 | End: 2020-08-10

## 2020-08-10 RX ORDER — NALOXONE HYDROCHLORIDE 4 MG/.1ML
0.1 SPRAY NASAL
Refills: 0 | Status: DISCONTINUED | OUTPATIENT
Start: 2020-08-10 | End: 2020-08-13

## 2020-08-10 RX ORDER — KETOROLAC TROMETHAMINE 30 MG/ML
15 SYRINGE (ML) INJECTION EVERY 6 HOURS
Refills: 0 | Status: DISCONTINUED | OUTPATIENT
Start: 2020-08-10 | End: 2020-08-11

## 2020-08-10 RX ORDER — SODIUM CHLORIDE 9 MG/ML
3 INJECTION INTRAMUSCULAR; INTRAVENOUS; SUBCUTANEOUS EVERY 8 HOURS
Refills: 0 | Status: DISCONTINUED | OUTPATIENT
Start: 2020-08-10 | End: 2020-08-10

## 2020-08-10 RX ORDER — ACETAMINOPHEN 500 MG
1000 TABLET ORAL ONCE
Refills: 0 | Status: DISCONTINUED | OUTPATIENT
Start: 2020-08-10 | End: 2020-08-10

## 2020-08-10 RX ORDER — ENOXAPARIN SODIUM 100 MG/ML
40 INJECTION SUBCUTANEOUS EVERY 24 HOURS
Refills: 0 | Status: DISCONTINUED | OUTPATIENT
Start: 2020-08-10 | End: 2020-08-13

## 2020-08-10 RX ADMIN — SODIUM CHLORIDE 40 MILLILITER(S): 9 INJECTION, SOLUTION INTRAVENOUS at 19:53

## 2020-08-10 RX ADMIN — Medication 1000 MILLIGRAM(S): at 23:54

## 2020-08-10 RX ADMIN — PYRIDOSTIGMINE BROMIDE 180 MILLIGRAM(S): 60 SOLUTION ORAL at 21:40

## 2020-08-10 RX ADMIN — Medication 5 MILLIGRAM(S): at 19:45

## 2020-08-10 RX ADMIN — Medication 400 MILLIGRAM(S): at 23:26

## 2020-08-10 RX ADMIN — Medication 10 MILLIGRAM(S): at 22:00

## 2020-08-10 RX ADMIN — GABAPENTIN 600 MILLIGRAM(S): 400 CAPSULE ORAL at 09:52

## 2020-08-10 NOTE — CHART NOTE - NSCHARTNOTEFT_GEN_A_CORE
Patient seen and examined at bedside. S/p LAP robotic assisted partial colectomy  with low anterior resection of rectum for colon diverticulitis. Intra op found large phlegmonous mass in sigmoid, with abscess present at time of surgery, purulence drained. EEA 29 at top of rectum. Now is 5 hours pot-op, NPO. Pain is controlled with spinal dura-morphine with standing IV Tylenol. Pt mildly dizzy, but denies N/V, or Abd pain.   Overnight Team alerted for /92, no CP, SOB, or dyspnea. HD stable with HR~65, pt was given 10mg Hydralazine.    Physical Exam  General: NAD  Respiratory: Patent airway, No labored breathing.  Abdominal: Dressings are intact without active bleeding or hematoma. Abd S, NT, ND  Extremities: No edema    Vital Signs Last 24 Hrs  T(C): 36.6 (10 Aug 2020 22:23), Max: 36.6 (10 Aug 2020 09:34)  T(F): 97.9 (10 Aug 2020 22:23), Max: 97.9 (10 Aug 2020 09:34)  HR: 71 (10 Aug 2020 22:23) (58 - 78)  BP: 155/77 (10 Aug 2020 22:23) (141/77 - 189/90)  BP(mean): 111 (10 Aug 2020 22:23) (111 - 132)  RR: 15 (10 Aug 2020 22:13) (15 - 22)  SpO2: 100% (10 Aug 2020 22:23) (97% - 100%)    I&O's Detail    10 Aug 2020 07:01  -  10 Aug 2020 22:33  --------------------------------------------------------  IN:    lactated ringers.: 160 mL    Oral Fluid: 60 mL  Total IN: 220 mL    OUT:    Indwelling Catheter - Urethral: 530 mL  Total OUT: 530 mL    Total NET: -310 mL          MEDICATIONS  (STANDING):  acetaminophen  IVPB .. 1000 milliGRAM(s) IV Intermittent every 6 hours  acetaminophen  IVPB .. 1000 milliGRAM(s) IV Intermittent once  enoxaparin Injectable 40 milliGRAM(s) SubCutaneous every 24 hours  ketorolac   Injectable 15 milliGRAM(s) IV Push every 6 hours  lactated ringers. 1000 milliLiter(s) (40 mL/Hr) IV Continuous <Continuous>  lactated ringers. 1000 milliLiter(s) (75 mL/Hr) IV Continuous <Continuous>  morphine PF Spinal 0.1 milliGRAM(s) IntraThecal. once  pyridostigmine 60 milliGRAM(s) Oral four times a day  pyridostigmine  milliGRAM(s) Oral at bedtime    MEDICATIONS  (PRN):  naloxone Injectable 0.1 milliGRAM(s) IV Push every 3 minutes PRN For ANY of the following changes in patient status:  A. RR LESS THAN 10 breaths per minute, B. Oxygen saturation LESS THAN 90%, C. Sedation score of 6  ondansetron Injectable 4 milliGRAM(s) IV Push every 6 hours PRN Nausea  oxyCODONE    IR 5 milliGRAM(s) Oral every 4 hours PRN Moderate Pain (4 - 6)  oxyCODONE    IR 10 milliGRAM(s) Oral every 4 hours PRN Severe Pain (7 - 10)            Plan:  - Pain control with spinal morphine and standing IV Tylenol   - NPO  - BP soft after 10mg of IV hydralazine now 155/77 from 189/92. PT deemed hemodynamically stable and transferable to floors.  - Continuous vitals monitoring including BP, If SBP >175 , please page.  - Cruz will likely be dc'ed after 24hours.  - Strict I/O's  - Encourage IS Patient seen and examined at bedside. S/p LAP robotic assisted partial colectomy  with low anterior resection of rectum for colon diverticulitis. Intra op found large phlegmonous mass in sigmoid, with abscess present at time of surgery, purulence drained. EEA 29 at top of rectum. Now is 5 hours pot-op, CLD. Pain is controlled with spinal dura-morphine with standing IV Tylenol. Pt mildly dizzy, but denies N/V, or Abd pain.   Overnight Team alerted for /92, no CP, SOB, or dyspnea. HD stable with HR~65, pt was given 10mg Hydralazine.    Physical Exam  General: NAD  Respiratory: Patent airway, No labored breathing.  Abdominal: Dressings are intact without active bleeding or hematoma. Abd S, NT, ND  Extremities: No edema    Vital Signs Last 24 Hrs  T(C): 36.6 (10 Aug 2020 22:23), Max: 36.6 (10 Aug 2020 09:34)  T(F): 97.9 (10 Aug 2020 22:23), Max: 97.9 (10 Aug 2020 09:34)  HR: 71 (10 Aug 2020 22:23) (58 - 78)  BP: 155/77 (10 Aug 2020 22:23) (141/77 - 189/90)  BP(mean): 111 (10 Aug 2020 22:23) (111 - 132)  RR: 15 (10 Aug 2020 22:13) (15 - 22)  SpO2: 100% (10 Aug 2020 22:23) (97% - 100%)    I&O's Detail    10 Aug 2020 07:01  -  10 Aug 2020 22:33  --------------------------------------------------------  IN:    lactated ringers.: 160 mL    Oral Fluid: 60 mL  Total IN: 220 mL    OUT:    Indwelling Catheter - Urethral: 530 mL  Total OUT: 530 mL    Total NET: -310 mL          MEDICATIONS  (STANDING):  acetaminophen  IVPB .. 1000 milliGRAM(s) IV Intermittent every 6 hours  acetaminophen  IVPB .. 1000 milliGRAM(s) IV Intermittent once  enoxaparin Injectable 40 milliGRAM(s) SubCutaneous every 24 hours  ketorolac   Injectable 15 milliGRAM(s) IV Push every 6 hours  lactated ringers. 1000 milliLiter(s) (40 mL/Hr) IV Continuous <Continuous>  lactated ringers. 1000 milliLiter(s) (75 mL/Hr) IV Continuous <Continuous>  morphine PF Spinal 0.1 milliGRAM(s) IntraThecal. once  pyridostigmine 60 milliGRAM(s) Oral four times a day  pyridostigmine  milliGRAM(s) Oral at bedtime    MEDICATIONS  (PRN):  naloxone Injectable 0.1 milliGRAM(s) IV Push every 3 minutes PRN For ANY of the following changes in patient status:  A. RR LESS THAN 10 breaths per minute, B. Oxygen saturation LESS THAN 90%, C. Sedation score of 6  ondansetron Injectable 4 milliGRAM(s) IV Push every 6 hours PRN Nausea  oxyCODONE    IR 5 milliGRAM(s) Oral every 4 hours PRN Moderate Pain (4 - 6)  oxyCODONE    IR 10 milliGRAM(s) Oral every 4 hours PRN Severe Pain (7 - 10)          Plan:  - Pain control with spinal morphine and standing IV Tylenol   - CLD  - 10mg of IV hydralazine /77 from 189/92. PT deemed hemodynamically stable and transferable to floors.  - Continuous vitals monitoring including BP, If SBP >175 , please page.  - Nancy will likely be dc'ed after 24hours.  - Strict I/O's  - Encourage IS

## 2020-08-11 ENCOUNTER — TRANSCRIPTION ENCOUNTER (OUTPATIENT)
Age: 65
End: 2020-08-11

## 2020-08-11 LAB
ANION GAP SERPL CALC-SCNC: 15 MMOL/L — SIGNIFICANT CHANGE UP (ref 5–17)
BASOPHILS # BLD AUTO: 0.01 K/UL — SIGNIFICANT CHANGE UP (ref 0–0.2)
BASOPHILS NFR BLD AUTO: 0.1 % — SIGNIFICANT CHANGE UP (ref 0–2)
BUN SERPL-MCNC: 14 MG/DL — SIGNIFICANT CHANGE UP (ref 7–23)
CALCIUM SERPL-MCNC: 8.6 MG/DL — SIGNIFICANT CHANGE UP (ref 8.4–10.5)
CHLORIDE SERPL-SCNC: 100 MMOL/L — SIGNIFICANT CHANGE UP (ref 96–108)
CO2 SERPL-SCNC: 21 MMOL/L — LOW (ref 22–31)
CREAT SERPL-MCNC: 1.29 MG/DL — SIGNIFICANT CHANGE UP (ref 0.5–1.3)
EOSINOPHIL # BLD AUTO: 0 K/UL — SIGNIFICANT CHANGE UP (ref 0–0.5)
EOSINOPHIL NFR BLD AUTO: 0 % — SIGNIFICANT CHANGE UP (ref 0–6)
GLUCOSE SERPL-MCNC: 114 MG/DL — HIGH (ref 70–99)
HCT VFR BLD CALC: 42.3 % — SIGNIFICANT CHANGE UP (ref 39–50)
HGB BLD-MCNC: 13.5 G/DL — SIGNIFICANT CHANGE UP (ref 13–17)
IMM GRANULOCYTES NFR BLD AUTO: 0.2 % — SIGNIFICANT CHANGE UP (ref 0–1.5)
LYMPHOCYTES # BLD AUTO: 2.23 K/UL — SIGNIFICANT CHANGE UP (ref 1–3.3)
LYMPHOCYTES # BLD AUTO: 22.3 % — SIGNIFICANT CHANGE UP (ref 13–44)
MAGNESIUM SERPL-MCNC: 2.1 MG/DL — SIGNIFICANT CHANGE UP (ref 1.6–2.6)
MCHC RBC-ENTMCNC: 27.9 PG — SIGNIFICANT CHANGE UP (ref 27–34)
MCHC RBC-ENTMCNC: 31.9 GM/DL — LOW (ref 32–36)
MCV RBC AUTO: 87.4 FL — SIGNIFICANT CHANGE UP (ref 80–100)
MONOCYTES # BLD AUTO: 1.04 K/UL — HIGH (ref 0–0.9)
MONOCYTES NFR BLD AUTO: 10.4 % — SIGNIFICANT CHANGE UP (ref 2–14)
NEUTROPHILS # BLD AUTO: 6.69 K/UL — SIGNIFICANT CHANGE UP (ref 1.8–7.4)
NEUTROPHILS NFR BLD AUTO: 67 % — SIGNIFICANT CHANGE UP (ref 43–77)
NRBC # BLD: 0 /100 WBCS — SIGNIFICANT CHANGE UP (ref 0–0)
PHOSPHATE SERPL-MCNC: 2.8 MG/DL — SIGNIFICANT CHANGE UP (ref 2.5–4.5)
PLATELET # BLD AUTO: 239 K/UL — SIGNIFICANT CHANGE UP (ref 150–400)
POTASSIUM SERPL-MCNC: 4.1 MMOL/L — SIGNIFICANT CHANGE UP (ref 3.5–5.3)
POTASSIUM SERPL-SCNC: 4.1 MMOL/L — SIGNIFICANT CHANGE UP (ref 3.5–5.3)
RBC # BLD: 4.84 M/UL — SIGNIFICANT CHANGE UP (ref 4.2–5.8)
RBC # FLD: 13.3 % — SIGNIFICANT CHANGE UP (ref 10.3–14.5)
SODIUM SERPL-SCNC: 136 MMOL/L — SIGNIFICANT CHANGE UP (ref 135–145)
WBC # BLD: 9.99 K/UL — SIGNIFICANT CHANGE UP (ref 3.8–10.5)
WBC # FLD AUTO: 9.99 K/UL — SIGNIFICANT CHANGE UP (ref 3.8–10.5)

## 2020-08-11 RX ADMIN — Medication 400 MILLIGRAM(S): at 12:31

## 2020-08-11 RX ADMIN — Medication 15 MILLIGRAM(S): at 06:22

## 2020-08-11 RX ADMIN — PYRIDOSTIGMINE BROMIDE 60 MILLIGRAM(S): 60 SOLUTION ORAL at 05:05

## 2020-08-11 RX ADMIN — Medication 400 MILLIGRAM(S): at 16:43

## 2020-08-11 RX ADMIN — Medication 15 MILLIGRAM(S): at 21:42

## 2020-08-11 RX ADMIN — Medication 15 MILLIGRAM(S): at 14:39

## 2020-08-11 RX ADMIN — PYRIDOSTIGMINE BROMIDE 60 MILLIGRAM(S): 60 SOLUTION ORAL at 12:34

## 2020-08-11 RX ADMIN — Medication 1000 MILLIGRAM(S): at 13:02

## 2020-08-11 RX ADMIN — Medication 15 MILLIGRAM(S): at 00:45

## 2020-08-11 RX ADMIN — Medication 15 MILLIGRAM(S): at 06:04

## 2020-08-11 RX ADMIN — Medication 1000 MILLIGRAM(S): at 17:13

## 2020-08-11 RX ADMIN — Medication 400 MILLIGRAM(S): at 05:05

## 2020-08-11 RX ADMIN — PYRIDOSTIGMINE BROMIDE 180 MILLIGRAM(S): 60 SOLUTION ORAL at 21:43

## 2020-08-11 RX ADMIN — Medication 15 MILLIGRAM(S): at 14:24

## 2020-08-11 RX ADMIN — Medication 1000 MILLIGRAM(S): at 05:24

## 2020-08-11 RX ADMIN — PYRIDOSTIGMINE BROMIDE 60 MILLIGRAM(S): 60 SOLUTION ORAL at 16:43

## 2020-08-11 RX ADMIN — PYRIDOSTIGMINE BROMIDE 60 MILLIGRAM(S): 60 SOLUTION ORAL at 00:44

## 2020-08-11 RX ADMIN — Medication 15 MILLIGRAM(S): at 01:01

## 2020-08-11 RX ADMIN — ENOXAPARIN SODIUM 40 MILLIGRAM(S): 100 INJECTION SUBCUTANEOUS at 12:34

## 2020-08-11 RX ADMIN — ONDANSETRON 4 MILLIGRAM(S): 8 TABLET, FILM COATED ORAL at 10:05

## 2020-08-11 RX ADMIN — Medication 15 MILLIGRAM(S): at 22:12

## 2020-08-11 NOTE — DISCHARGE NOTE PROVIDER - NSDCFUADDINST_GEN_ALL_CORE_FT
You may use Tylenol or Motrin for pain control every 6 hours, with oxycodone as needed if pain is not controlled with the Tylenol/Motrin. We suggest staggering the Tylenol/Motrin every 3 hours for maximum pain relief.     You may shower but do not soak in the tub, pool, or ocean.     You should call the doctor's office if you develop intractable nausea, vomiting, or pain that cannot be controlled with oxycodone. If the doctor's office is not open or you feel this is an emergency, please call 911 or visit the nearest emergency room.

## 2020-08-11 NOTE — PROGRESS NOTE ADULT - ASSESSMENT
Assessment: 65M POD1 s/p LAR and sigmoid colectomy for diverticulitis. Pt is doing well, is ambulatory, and has passed gas.    Plan:  - Progress to low residue diet  - Trial of void  - DVT Prophylaxis: Continue with enoxaparin + OOB  - Pain: Tylenol IV PRN  - Nausea: Zofran PRN Assessment: 65M POD1 s/p LAR and sigmoid colectomy for diverticulitis. Pt is doing well, is ambulatory, and has passed gas.    Plan:  - Progress to low residue diet  - Trial of void  - DVT Prophylaxis: Continue with enoxaparin + OOB  - Pain: Tylenol IV PRN  - Nausea: Zofran PRN    Fili Moore MS3  Red Team Surgery [id number] Assessment: 65M POD1 s/p LAR and sigmoid colectomy for diverticulitis. Pt is doing well, is ambulatory, and has passed gas.    Plan:  - Advance to low residue diet  - Trial of void, DTV 2:30 PM  - DVT Prophylaxis  - OOB/ambulation  - Pain: Tylenol IV PRN, transition to PO pain meds  - Nausea: Zofran PRN  - d/c planning    Fili Moore MS3  Red Team Surgery [id number]    Agree with medical student's assessment and plan as above.  OSVALDO Vigil PA-C , pager # [phone]

## 2020-08-11 NOTE — DISCHARGE NOTE PROVIDER - NSDCCPTREATMENT_GEN_ALL_CORE_FT
PRINCIPAL PROCEDURE  Procedure: Colectomy, partial, robot-assisted, laparoscopic, with low anterior resection of rectum  Findings and Treatment: PRINCIPAL PROCEDURE  Procedure: Colectomy, partial, robot-assisted, laparoscopic, with low anterior resection of rectum  Findings and Treatment:       SECONDARY PROCEDURE  Procedure: Bryn procedure  Findings and Treatment: PRINCIPAL PROCEDURE  Procedure: Colectomy, partial, robot-assisted, laparoscopic, with low anterior resection of rectum  Findings and Treatment: recover from surgery      SECONDARY PROCEDURE  Procedure: Bryn procedure  Findings and Treatment: recover from surgery

## 2020-08-11 NOTE — DIETITIAN INITIAL EVALUATION ADULT. - ADD RECOMMEND
1) Continue current diet as tolerated. 2) Diet education provided, reinforce as needed. 3) RD to remain available and follow-up as medically appropriate.

## 2020-08-11 NOTE — PROGRESS NOTE ADULT - SUBJECTIVE AND OBJECTIVE BOX
POD #1    24hr events:  - Pt was hypertensive in PACU and given hydralazine and labetalol  - Pyridostigmine resumed for myasthenia gravis  - Pt started on clear fluids     Overnight events:   - No acute events    SUBJECTIVE: Pt was seen and examined at bedside. He complained of diffuse abdominal pain and was given IV tylenol at 5am. Overnight, he got out of bed to use the bathroom and passed gas along with a small amount of bright red blood and melena. He denies shortness of breath, or vomiting, but complained of brief dizziness and some nausea when standing up.       OBJECTIVE:  Vital Signs Last 24 Hrs  T(C): 36.7 (11 Aug 2020 05:35), Max: 37.4 (11 Aug 2020 02:09)  T(F): 98 (11 Aug 2020 05:35), Max: 99.3 (11 Aug 2020 02:09)  HR: 66 (11 Aug 2020 05:35) (58 - 80)  BP: 122/62 (11 Aug 2020 05:35) (122/62 - 189/90)  BP(mean): 111 (10 Aug 2020 22:23) (111 - 132)  RR: 18 (11 Aug 2020 05:35) (15 - 22)  SpO2: 98% (11 Aug 2020 05:35) (97% - 100%)    Physical Examination:  GEN: NAD, resting quietly  PULM: symmetric chest rise bilaterally, no increased WOB  ABD: soft, distended, tympanitic. Tenderness to RUQ, LUQ, LLQ palpation, central umbilical dressing showed mild serosanguinous fluid staining. Other incisions c/d/i  EXTR: no LE erythema, moving all extremities    Cruz catheter output 650 clear, light colored urine

## 2020-08-11 NOTE — DISCHARGE NOTE PROVIDER - NSDCCPCAREPLAN_GEN_ALL_CORE_FT
PRINCIPAL DISCHARGE DIAGNOSIS  Diagnosis: Diverticulitis  Assessment and Plan of Treatment: WOUND CARE: Keep incisions clean and dry.  Follow-up in office 1-2 weeks. for staple removal.  Steri-strips have been applied to your incisions.  Do not remove these.  They will fall off as they get wet; in approximately 7-10 days.   BATHING: Please do not submerge wound underwater. You may shower and/or sponge bathe.  ACTIVITY: No heavy lifting or straining. Otherwise, you may return to your usual level of physical activity. If you are taking narcotic pain medication (such as Percocet), do NOT drive a car, operate machinery or make important decisions.  DIET: Low fiber diet  NOTIFY YOUR SURGEON IF: You have any bleeding that does not stop, any pus draining from your wound, any fever (over 100.4 F) or chills, persistent nausea/vomiting, persistent diarrhea, or if your pain is not controlled on your discharge pain medications.  FOLLOW-UP:  1. Follow-up with Dr. Martinez within 1-2 weeks of discharge.  Please call office for appointment  2. Please follow up with your primary care physician in one week regarding your hospitalization. PRINCIPAL DISCHARGE DIAGNOSIS  Diagnosis: Diverticulitis  Assessment and Plan of Treatment: WOUND CARE: Keep incisions clean and dry.  Follow-up in office 1-2 weeks. for staple removal.  Steri-strips have been applied to your incisions.  Do not remove these.  They will fall off as they get wet; in approximately 7-10 days.   BATHING: Please do not submerge wound underwater. You may shower and/or sponge bathe.  ACTIVITY: No heavy lifting or straining. Otherwise, you may return to your usual level of physical activity. If you are taking narcotic pain medication (such as Percocet), do NOT drive a car, operate machinery or make important decisions.  DIET: Low fiber diet  NOTIFY YOUR SURGEON IF: You have any bleeding that does not stop, any pus draining from your wound, any fever (over 100.4 F) or chills, persistent nausea/vomiting, persistent diarrhea, or if your pain is not controlled on your discharge pain medications.  FOLLOW-UP:  1. Follow-up with Dr. Martinez within 1-2 weeks of discharge.  Please call office for appointment  2. Please follow up with your primary care physician in one week regarding your hospitalization.      SECONDARY DISCHARGE DIAGNOSES  Diagnosis: Large bowel anastomotic leak  Assessment and Plan of Treatment: PRINCIPAL DISCHARGE DIAGNOSIS  Diagnosis: Diverticulitis  Assessment and Plan of Treatment: WOUND CARE: Keep incisions clean and dry.  Follow-up in office 1-2 weeks. for staple removal.  Steri-strips have been applied to your incisions.  Do not remove these.  They will fall off as they get wet; in approximately 7-10 days. Please pack top portion of wound with dry gauze.  Cover with dry gauze and paper tape, change daily. Ostomy care as instructed by nursing staff.   BATHING: Please do not submerge wound underwater. You may shower and/or sponge bathe.  ACTIVITY: No heavy lifting or straining. Otherwise, you may return to your usual level of physical activity. If you are taking narcotic pain medication (such as Percocet), do NOT drive a car, operate machinery or make important decisions.  DIET: Low fiber diet  NOTIFY YOUR SURGEON IF: You have any bleeding that does not stop, any pus draining from your wound, any fever (over 100.4 F) or chills, persistent nausea/vomiting, lack of ostomy function, or if your pain is not controlled on your discharge pain medications.  FOLLOW-UP:  1. Follow-up with Dr. Martinez within 1-2 weeks of discharge.  Please call office for appointment  2. Please follow up with your primary care physician in one week regarding your hospitalization.      SECONDARY DISCHARGE DIAGNOSES  Diagnosis: Large bowel anastomotic leak  Assessment and Plan of Treatment: PRINCIPAL DISCHARGE DIAGNOSIS  Diagnosis: Diverticulitis  Assessment and Plan of Treatment: WOUND CARE: Keep incisions clean and dry.  Follow-up in office 1-2 weeks. for staple removal.  Steri-strips have been applied to your incisions.  Do not remove these.  They will fall off as they get wet; in approximately 7-10 days. Please pack top portion of wound with aquacel in two places.  Cover with dry gauze and paper tape, change daily. Ostomy care as instructed by nursing staff.   BATHING: Please do not submerge wound underwater. You may shower and/or sponge bathe.  ACTIVITY: No heavy lifting or straining. Otherwise, you may return to your usual level of physical activity. If you are taking narcotic pain medication (such as Percocet), do NOT drive a car, operate machinery or make important decisions.  DIET: Low fiber diet  NOTIFY YOUR SURGEON IF: You have any bleeding that does not stop, any pus draining from your wound, any fever (over 100.4 F) or chills, persistent nausea/vomiting, lack of ostomy function, or if your pain is not controlled on your discharge pain medications.  FOLLOW-UP:  1. Follow-up with Dr. Martinez within 1-2 weeks of discharge.  Please call office for appointment  2. Please follow up with your primary care physician in one week regarding your hospitalization.      SECONDARY DISCHARGE DIAGNOSES  Diagnosis: Large bowel anastomotic leak  Assessment and Plan of Treatment: Ostomy care as instructed by nursing staff.  Please complete 14 day course of Augmentin

## 2020-08-11 NOTE — DISCHARGE NOTE PROVIDER - CARE PROVIDER_API CALL
Ryan Martinez  COLON/RECTAL SURGERY  [address] [place] [address].  [place] [address]  Phone: [phone]  Fax: [phone]  Follow Up Time: 2 weeks

## 2020-08-11 NOTE — DISCHARGE NOTE PROVIDER - NSDCHHHOMEBOUNDOTHER_GEN_ALL_CORE_FT
packing to midline wound with dry gauze and paper tape daily packing to midline wound with2 separate pieces of aquacel and paper tape daily

## 2020-08-11 NOTE — DISCHARGE NOTE PROVIDER - NSDCMRMEDTOKEN_GEN_ALL_CORE_FT
meclizine 25 mg oral tablet: 1 tab(s) orally 3 times a day, As Needed  pyridostigmine 180 mg oral tablet, extended release: 1 tab(s) orally once a day (at bedtime)  pyridostigmine 60 mg oral tablet: 1 tab(s) orally 4 times a day (pt takes 3-4x daily) acetaminophen 325 mg oral tablet: 3 tab(s) orally every 6 hours  meclizine 25 mg oral tablet: 1 tab(s) orally 3 times a day, As Needed  oxyCODONE 5 mg oral tablet: 1 tab(s) orally every 4 hours, As needed, Moderate Pain (4 - 6) MDD:4  pyridostigmine 180 mg oral tablet, extended release: 1 tab(s) orally once a day (at bedtime)  pyridostigmine 60 mg oral tablet: 1 tab(s) orally 4 times a day (pt takes 3-4x daily) acetaminophen 325 mg oral tablet: 3 tab(s) orally every 6 hours  amLODIPine 10 mg oral tablet: 1 tab(s) orally once a day  meclizine 25 mg oral tablet: 1 tab(s) orally 3 times a day, As Needed  oxyCODONE 5 mg oral tablet: 1 tab(s) orally every 4 hours, As needed, Moderate Pain (4 - 6) MDD:4  pyridostigmine 180 mg oral tablet, extended release: 1 tab(s) orally once a day (at bedtime)  pyridostigmine 60 mg oral tablet: 1 tab(s) orally 4 times a day (pt takes 3-4x daily) acetaminophen 325 mg oral tablet: 3 tab(s) orally every 6 hours  amLODIPine 10 mg oral tablet: 1 tab(s) orally once a day  Augmentin 875 mg-125 mg oral tablet: 1 tab(s) orally every 12 hours   meclizine 25 mg oral tablet: 1 tab(s) orally 3 times a day, As Needed  oxyCODONE 5 mg oral tablet: 1 tab(s) orally every 4 hours, As needed, Moderate Pain (4 - 6) MDD:4  pyridostigmine 180 mg oral tablet, extended release: 1 tab(s) orally once a day (at bedtime)  pyridostigmine 60 mg oral tablet: 1 tab(s) orally 4 times a day (pt takes 3-4x daily)

## 2020-08-11 NOTE — DISCHARGE NOTE PROVIDER - NSDCHHNEEDSERVICE_GEN_ALL_CORE
Ostomy care and management Wound care and assessment/Ostomy care and management/Teaching and training

## 2020-08-11 NOTE — DISCHARGE NOTE PROVIDER - NSDCHHHOMEBOUND_GEN_ALL_CORE
Pain greater than 7 on scale of 10 on ambulation Other, specify.../Pain greater than 7 on scale of 10 on ambulation

## 2020-08-11 NOTE — DIETITIAN INITIAL EVALUATION ADULT. - PHYSICAL APPEARANCE
well nourished/other (specify) Ht: 69inches , Wt: 171lbs, BMI: 25.3kg/m2, IBW: 160lbs +/- 10%, %IBW: 107%  Edema: none  Skin: free of pressure injuries per nursing flow sheets

## 2020-08-11 NOTE — DISCHARGE NOTE PROVIDER - HOSPITAL COURSE
66 yo male with h/o Myasthenia Gravis and vertigo, s/p two recent bouts of diverticulitis. The first episode occurred 10/2019 (Chandrika) and the most recent was 3/2020 (Gregory).  Pt is scheduled for ERAS and Robotic Low Anterior Resection on 8/10/2020.    On  8/10/2020 pt underwent ERAS robotic assisted partial colectomy. He tolerated the procedure well, was extubated and sent to PACU in stable condition.  He remained hemodynamically stable and was transferred to a surgical floor. His pain was controlled by IV pain medications and then by PO pain medications. Cruz was removed and he voided without difficulty.  He was advanced from clears to low fiber diet and tolerated it well.  He is ambulating, voiding, tolerating a low fiber diet, and pain is controlled with oral pain medications.  Patient is stable for discharge home and will follow-up with Dr. Martinez within 1-2 weeks. 66 yo male with h/o Myasthenia Gravis and vertigo, s/p two recent bouts of diverticulitis. The first episode occurred 10/2019 (Chandrika) and the most recent was 3/2020 (Gregory).  Pt is scheduled for ERAS and Robotic Low Anterior Resection on 8/10/2020.  On  8/10/2020 pt underwent ERAS robotic assisted partial colectomy. He tolerated the procedure well, was extubated and sent to PACU in stable condition.  He remained hemodynamically stable and was transferred to a surgical floor. His pain was controlled by IV pain medications and then by PO pain medications. Cruz was removed and he voided without difficulty.  He was advanced from clears to low fiber diet and tolerated it well.  He is ambulating, voiding, tolerating a low fiber diet, and pain is controlled with oral pain medications.  Patient is stable for discharge home and will follow-up with Dr. Martinez within 1-2 weeks. 64 yo male with h/o Myasthenia Gravis and vertigo, s/p two recent bouts of diverticulitis. The first episode occurred 10/2019 (Chandrika) and the most recent was 3/2020 (Gregory).  Pt is scheduled for ERAS and Robotic Low Anterior Resection on 8/10/2020.  On  8/10/2020 pt underwent ERAS robotic assisted partial colectomy. He tolerated the procedure well, was extubated and sent to PACU in stable condition.  He remained hemodynamically stable and was transferred to a surgical floor. His pain was controlled by IV pain medications and then by PO pain medications. Cruz was removed and he voided without difficulty.  He was advanced from clears to low fiber diet and tolerated it well.  During hospitalization, pt had hypophosphatemia and was repleted with IV phosphate.     On 8/13 pt was febrile, fever workup performed which showed******************************************************************************************************                    He is ambulating, voiding, tolerating a low fiber diet, and pain is controlled with oral pain medications.  Patient is stable for discharge home and will follow-up with Dr. Martinez within 1-2 weeks. 64 yo male with h/o Myasthenia Gravis and vertigo, s/p two recent bouts of diverticulitis. The first episode occurred 10/2019 (Chandrika) and the most recent was 3/2020 (Gregory).  Pt is scheduled for ERAS and Robotic Low Anterior Resection on 8/10/2020.  On  8/10/2020 pt underwent ERAS robotic assisted partial colectomy. He tolerated the procedure well, was extubated and sent to PACU in stable condition.  He remained hemodynamically stable and was transferred to a surgical floor. His pain was controlled by IV pain medications and then by PO pain medications. Cruz was removed and he voided without difficulty.  He was advanced from clears to low fiber diet and tolerated it well.  During hospitalization, pt had hypophosphatemia and was repleted with IV phosphate.     On 8/13 pt was febrile, fever workup performed which showed*********************    ***********************this discharge summary is incomplete.  Please complete prior to discharging patient************************************************                 He is ambulating, voiding, tolerating a low fiber diet, and pain is controlled with oral pain medications.  Patient is stable for discharge home and will follow-up with Dr. Martinez within 1-2 weeks. 64 yo male with h/o Myasthenia Gravis and vertigo, s/p two recent bouts of diverticulitis. The first episode occurred 10/2019 (Chandrika) and the most recent was 3/2020 (Gregory).  Pt is scheduled for ERAS and Robotic Low Anterior Resection on 8/10/2020.  On  8/10/2020 pt underwent ERAS robotic assisted partial colectomy. He tolerated the procedure well, was extubated and sent to PACU in stable condition.  He remained hemodynamically stable and was transferred to a surgical floor. His pain was controlled by IV pain medications and then by PO pain medications. Cruz was removed and he voided without difficulty.  His course was complicated by fever and leukocytosis. He returned to the OR sfor exploratory laparotomy, washout, and cata's procedure as CT found a leak at the rectosigmoid anastomosis. Post operatively, his leukocytosis resolved, and he was  advanced from clears to low fiber diet and tolerated it well.  During hospitalization, pt had hypophosphatemia and was repleted with IV phosphate.             ***********************this discharge summary is incomplete.  Please complete prior to discharging patient************************************************                 He is ambulating, voiding, tolerating a low fiber diet, and pain is controlled with oral pain medications.  Patient is stable for discharge home and will follow-up with Dr. Martinez within 1-2 weeks. 66 yo male with h/o Myasthenia Gravis and diverticulitis who presented for scheduled surgery and underwent a robotic low anterior resection on 8/10/2020.  On POD2, patient was noted to have a leukocytosis and on POD3 became febrile. CT Abd/Pelvis with PO and IV contrast showed a leak at the rectosigmoid anastomosis. Patient was taken to the OR emergently and on 8/13 underwent a Bryn's procedure with creation of an end colectomy. Patient's postoperative course was uncomplicated and his pain regimen was transitioned from IV to oral medications. Patient's diet was advanced as tolerated and his ostomy had evidence of function. Patient received a course of Zosyn from 8/13-8/17. On day of discharge, patient's pain was well controlled and he was ambulating independently. Patient will follow up with Dr. Martinez in 1-2 weeks. 64 yo male with h/o Myasthenia Gravis and diverticulitis who presented for scheduled surgery and underwent a robotic low anterior resection on 8/10/2020.  On POD2, patient was noted to have a leukocytosis and on POD3 became febrile. CT Abd/Pelvis with PO and IV contrast showed a leak at the rectosigmoid anastomosis. Patient was taken to the OR emergently and on 8/13 underwent a Bryn's procedure with creation of an end colectomy. Patient's postoperative course was uncomplicated and his pain regimen was transitioned from IV to oral medications. Patient's diet was advanced as tolerated and his ostomy had evidence of function. Patient received a course of Zosyn from 8/13-8/17.  He had a repeat CT abdomen/pelvis which revealed some free fluid around spleen. He was restarted on Zosyn. He will complete a 14 day course of Augmentin as outpatient.  Part of wound was opened up and packed with aquacel.  On day of discharge, patient's pain was well controlled and he was ambulating independently. Patient will follow up with Dr. Martinez in 1-2 weeks.

## 2020-08-11 NOTE — PROGRESS NOTE ADULT - SUBJECTIVE AND OBJECTIVE BOX
Day 1 of Anesthesia Pain Management Service    SUBJECTIVE:  Pain Scale Score:          [X] Refer to charted pain scores    THERAPY:    s/p 0.100 mg PF morphine on 08/10/2020      MEDICATIONS  (STANDING):  acetaminophen  IVPB .. 1000 milliGRAM(s) IV Intermittent every 6 hours  enoxaparin Injectable 40 milliGRAM(s) SubCutaneous every 24 hours  ketorolac   Injectable 15 milliGRAM(s) IV Push every 6 hours  morphine PF Spinal 0.1 milliGRAM(s) IntraThecal. once  pyridostigmine 60 milliGRAM(s) Oral four times a day  pyridostigmine  milliGRAM(s) Oral at bedtime    MEDICATIONS  (PRN):  naloxone Injectable 0.1 milliGRAM(s) IV Push every 3 minutes PRN For ANY of the following changes in patient status:  A. RR LESS THAN 10 breaths per minute, B. Oxygen saturation LESS THAN 90%, C. Sedation score of 6  ondansetron Injectable 4 milliGRAM(s) IV Push every 6 hours PRN Nausea  oxyCODONE    IR 5 milliGRAM(s) Oral every 4 hours PRN Moderate Pain (4 - 6)  oxyCODONE    IR 10 milliGRAM(s) Oral every 4 hours PRN Severe Pain (7 - 10)      OBJECTIVE:    Sedation:        	[X] Alert	[ ] Drowsy	[ ] Arousable      [ ] Asleep       [ ] Unresponsive    Side Effects:	[X] None	[ ] Nausea	[ ] Vomiting         [ ] Pruritus  		[ ] Weakness            [ ] Numbness	          [ ] Other:    Vital Signs Last 24 Hrs  T(C): 36.9 (11 Aug 2020 08:44), Max: 37.4 (11 Aug 2020 02:09)  T(F): 98.4 (11 Aug 2020 08:44), Max: 99.3 (11 Aug 2020 02:09)  HR: 68 (11 Aug 2020 08:44) (58 - 80)  BP: 132/66 (11 Aug 2020 08:44) (122/62 - 189/90)  BP(mean): 111 (10 Aug 2020 22:23) (111 - 132)  RR: 18 (11 Aug 2020 08:44) (15 - 22)  SpO2: 97% (11 Aug 2020 08:44) (97% - 100%)    ASSESSMENT/ PLAN  [X] Patient transitioned to prn analgesics  [X] Pain management per primary service, pain service to sign off   [X]Documentation and Verification of current medications

## 2020-08-12 ENCOUNTER — TRANSCRIPTION ENCOUNTER (OUTPATIENT)
Age: 65
End: 2020-08-12

## 2020-08-12 LAB
ANION GAP SERPL CALC-SCNC: 11 MMOL/L — SIGNIFICANT CHANGE UP (ref 5–17)
ANION GAP SERPL CALC-SCNC: 17 MMOL/L — SIGNIFICANT CHANGE UP (ref 5–17)
BUN SERPL-MCNC: 12 MG/DL — SIGNIFICANT CHANGE UP (ref 7–23)
BUN SERPL-MCNC: 15 MG/DL — SIGNIFICANT CHANGE UP (ref 7–23)
CALCIUM SERPL-MCNC: 8.9 MG/DL — SIGNIFICANT CHANGE UP (ref 8.4–10.5)
CALCIUM SERPL-MCNC: 9 MG/DL — SIGNIFICANT CHANGE UP (ref 8.4–10.5)
CHLORIDE SERPL-SCNC: 101 MMOL/L — SIGNIFICANT CHANGE UP (ref 96–108)
CHLORIDE SERPL-SCNC: 98 MMOL/L — SIGNIFICANT CHANGE UP (ref 96–108)
CO2 SERPL-SCNC: 21 MMOL/L — LOW (ref 22–31)
CO2 SERPL-SCNC: 23 MMOL/L — SIGNIFICANT CHANGE UP (ref 22–31)
CREAT SERPL-MCNC: 1.3 MG/DL — SIGNIFICANT CHANGE UP (ref 0.5–1.3)
CREAT SERPL-MCNC: 1.44 MG/DL — HIGH (ref 0.5–1.3)
GLUCOSE SERPL-MCNC: 102 MG/DL — HIGH (ref 70–99)
GLUCOSE SERPL-MCNC: 112 MG/DL — HIGH (ref 70–99)
HCT VFR BLD CALC: 38.7 % — LOW (ref 39–50)
HCT VFR BLD CALC: 39.2 % — SIGNIFICANT CHANGE UP (ref 39–50)
HGB BLD-MCNC: 12.5 G/DL — LOW (ref 13–17)
HGB BLD-MCNC: 12.5 G/DL — LOW (ref 13–17)
MAGNESIUM SERPL-MCNC: 1.9 MG/DL — SIGNIFICANT CHANGE UP (ref 1.6–2.6)
MCHC RBC-ENTMCNC: 27.8 PG — SIGNIFICANT CHANGE UP (ref 27–34)
MCHC RBC-ENTMCNC: 28.2 PG — SIGNIFICANT CHANGE UP (ref 27–34)
MCHC RBC-ENTMCNC: 31.9 GM/DL — LOW (ref 32–36)
MCHC RBC-ENTMCNC: 32.3 GM/DL — SIGNIFICANT CHANGE UP (ref 32–36)
MCV RBC AUTO: 87.1 FL — SIGNIFICANT CHANGE UP (ref 80–100)
MCV RBC AUTO: 87.4 FL — SIGNIFICANT CHANGE UP (ref 80–100)
NRBC # BLD: 0 /100 WBCS — SIGNIFICANT CHANGE UP (ref 0–0)
NRBC # BLD: 0 /100 WBCS — SIGNIFICANT CHANGE UP (ref 0–0)
PHOSPHATE SERPL-MCNC: 1.3 MG/DL — LOW (ref 2.5–4.5)
PLATELET # BLD AUTO: 199 K/UL — SIGNIFICANT CHANGE UP (ref 150–400)
PLATELET # BLD AUTO: 206 K/UL — SIGNIFICANT CHANGE UP (ref 150–400)
POTASSIUM SERPL-MCNC: 3.5 MMOL/L — SIGNIFICANT CHANGE UP (ref 3.5–5.3)
POTASSIUM SERPL-MCNC: 4 MMOL/L — SIGNIFICANT CHANGE UP (ref 3.5–5.3)
POTASSIUM SERPL-SCNC: 3.5 MMOL/L — SIGNIFICANT CHANGE UP (ref 3.5–5.3)
POTASSIUM SERPL-SCNC: 4 MMOL/L — SIGNIFICANT CHANGE UP (ref 3.5–5.3)
RBC # BLD: 4.43 M/UL — SIGNIFICANT CHANGE UP (ref 4.2–5.8)
RBC # BLD: 4.5 M/UL — SIGNIFICANT CHANGE UP (ref 4.2–5.8)
RBC # FLD: 13.5 % — SIGNIFICANT CHANGE UP (ref 10.3–14.5)
RBC # FLD: 13.6 % — SIGNIFICANT CHANGE UP (ref 10.3–14.5)
SODIUM SERPL-SCNC: 135 MMOL/L — SIGNIFICANT CHANGE UP (ref 135–145)
SODIUM SERPL-SCNC: 136 MMOL/L — SIGNIFICANT CHANGE UP (ref 135–145)
WBC # BLD: 12.26 K/UL — HIGH (ref 3.8–10.5)
WBC # BLD: 12.32 K/UL — HIGH (ref 3.8–10.5)
WBC # FLD AUTO: 12.26 K/UL — HIGH (ref 3.8–10.5)
WBC # FLD AUTO: 12.32 K/UL — HIGH (ref 3.8–10.5)

## 2020-08-12 PROCEDURE — 93010 ELECTROCARDIOGRAM REPORT: CPT

## 2020-08-12 RX ORDER — ACETAMINOPHEN 500 MG
3 TABLET ORAL
Qty: 0 | Refills: 0 | DISCHARGE
Start: 2020-08-12

## 2020-08-12 RX ORDER — POTASSIUM CHLORIDE 20 MEQ
40 PACKET (EA) ORAL ONCE
Refills: 0 | Status: COMPLETED | OUTPATIENT
Start: 2020-08-12 | End: 2020-08-12

## 2020-08-12 RX ORDER — SODIUM,POTASSIUM PHOSPHATES 278-250MG
1 POWDER IN PACKET (EA) ORAL ONCE
Refills: 0 | Status: COMPLETED | OUTPATIENT
Start: 2020-08-12 | End: 2020-08-12

## 2020-08-12 RX ORDER — OXYCODONE HYDROCHLORIDE 5 MG/1
1 TABLET ORAL
Qty: 12 | Refills: 0
Start: 2020-08-12

## 2020-08-12 RX ADMIN — ENOXAPARIN SODIUM 40 MILLIGRAM(S): 100 INJECTION SUBCUTANEOUS at 11:25

## 2020-08-12 RX ADMIN — Medication 1 PACKET(S): at 10:28

## 2020-08-12 RX ADMIN — Medication 40 MILLIEQUIVALENT(S): at 10:27

## 2020-08-12 RX ADMIN — Medication 975 MILLIGRAM(S): at 05:56

## 2020-08-12 RX ADMIN — Medication 975 MILLIGRAM(S): at 00:54

## 2020-08-12 RX ADMIN — OXYCODONE HYDROCHLORIDE 5 MILLIGRAM(S): 5 TABLET ORAL at 05:37

## 2020-08-12 RX ADMIN — Medication 975 MILLIGRAM(S): at 05:26

## 2020-08-12 RX ADMIN — PYRIDOSTIGMINE BROMIDE 60 MILLIGRAM(S): 60 SOLUTION ORAL at 18:32

## 2020-08-12 RX ADMIN — PYRIDOSTIGMINE BROMIDE 180 MILLIGRAM(S): 60 SOLUTION ORAL at 21:31

## 2020-08-12 RX ADMIN — Medication 975 MILLIGRAM(S): at 11:55

## 2020-08-12 RX ADMIN — PYRIDOSTIGMINE BROMIDE 60 MILLIGRAM(S): 60 SOLUTION ORAL at 05:26

## 2020-08-12 RX ADMIN — Medication 975 MILLIGRAM(S): at 18:32

## 2020-08-12 RX ADMIN — Medication 975 MILLIGRAM(S): at 00:24

## 2020-08-12 RX ADMIN — PYRIDOSTIGMINE BROMIDE 60 MILLIGRAM(S): 60 SOLUTION ORAL at 11:24

## 2020-08-12 RX ADMIN — Medication 975 MILLIGRAM(S): at 11:24

## 2020-08-12 RX ADMIN — OXYCODONE HYDROCHLORIDE 5 MILLIGRAM(S): 5 TABLET ORAL at 06:07

## 2020-08-12 NOTE — PROGRESS NOTE ADULT - SUBJECTIVE AND OBJECTIVE BOX
POD #2    24hr events/Overnight events:   - No acute events    SUBJECTIVE:  Patient feels well. Has been OOB and voiding. Tolerating LRD, no nausea or vomiting. Reports flatus and BMs.    OBJECTIVE:  Vital Signs Last 24 Hrs  T(C): 37.5 (12 Aug 2020 05:19), Max: 37.5 (12 Aug 2020 05:19)  T(F): 99.5 (12 Aug 2020 05:19), Max: 99.5 (12 Aug 2020 05:19)  HR: 82 (12 Aug 2020 05:19) (62 - 82)  BP: 145/62 (12 Aug 2020 05:19) (132/66 - 166/75)  BP(mean): --  RR: 18 (12 Aug 2020 05:19) (18 - 18)  SpO2: 94% (12 Aug 2020 05:19) (94% - 100%)    Physical Examination:  GEN: NAD, resting quietly  PULM: symmetric chest rise bilaterally, no increased WOB  ABD: soft, appropriately tender, nondistended, incision CDI, no rebound or guarding  EXTR: no LE erythema, moving all extremities    LABS:                        13.5   9.99  )-----------( 239      ( 11 Aug 2020 09:14 )             42.3       08-11    136  |  100  |  14  ----------------------------<  114<H>  4.1   |  21<L>  |  1.29    Ca    8.6      11 Aug 2020 09:14  Phos  2.8     08-11  Mg     2.1     08-11          ASSESSMENT: 66yo M with PMH of myasthenia gravis POD2 s/p ERAS robot-assisted LAR and sigmoid colectomy for diverticulitis. Patient is stable and recovering appropriately, meeting all postoperative milestones.    PLAN:    NEURO: Pain control PRN, continue pyridostigmine   CV: HDS  PULM: Satting well on RA  GI: tolerating LRD  : Voiding spontaneously  HEME: DVT ppx  DISPO: anticipate discharge home today    [place], PGY-1  Red Team Surgery, [id number]

## 2020-08-13 ENCOUNTER — RESULT REVIEW (OUTPATIENT)
Age: 65
End: 2020-08-13

## 2020-08-13 LAB
ANION GAP SERPL CALC-SCNC: 14 MMOL/L — SIGNIFICANT CHANGE UP (ref 5–17)
APPEARANCE UR: CLEAR — SIGNIFICANT CHANGE UP
BACTERIA # UR AUTO: NEGATIVE — SIGNIFICANT CHANGE UP
BASOPHILS # BLD AUTO: 0.02 K/UL — SIGNIFICANT CHANGE UP (ref 0–0.2)
BASOPHILS NFR BLD AUTO: 0.2 % — SIGNIFICANT CHANGE UP (ref 0–2)
BILIRUB UR-MCNC: NEGATIVE — SIGNIFICANT CHANGE UP
BUN SERPL-MCNC: 9 MG/DL — SIGNIFICANT CHANGE UP (ref 7–23)
CALCIUM SERPL-MCNC: 9.3 MG/DL — SIGNIFICANT CHANGE UP (ref 8.4–10.5)
CHLORIDE SERPL-SCNC: 100 MMOL/L — SIGNIFICANT CHANGE UP (ref 96–108)
CO2 SERPL-SCNC: 24 MMOL/L — SIGNIFICANT CHANGE UP (ref 22–31)
COLOR SPEC: SIGNIFICANT CHANGE UP
CREAT SERPL-MCNC: 1.25 MG/DL — SIGNIFICANT CHANGE UP (ref 0.5–1.3)
DIFF PNL FLD: ABNORMAL
EOSINOPHIL # BLD AUTO: 0.05 K/UL — SIGNIFICANT CHANGE UP (ref 0–0.5)
EOSINOPHIL NFR BLD AUTO: 0.4 % — SIGNIFICANT CHANGE UP (ref 0–6)
EPI CELLS # UR: 0 /HPF — SIGNIFICANT CHANGE UP
GLUCOSE SERPL-MCNC: 89 MG/DL — SIGNIFICANT CHANGE UP (ref 70–99)
GLUCOSE UR QL: NEGATIVE — SIGNIFICANT CHANGE UP
HCT VFR BLD CALC: 40.4 % — SIGNIFICANT CHANGE UP (ref 39–50)
HGB BLD-MCNC: 12.7 G/DL — LOW (ref 13–17)
HYALINE CASTS # UR AUTO: 1 /LPF — SIGNIFICANT CHANGE UP (ref 0–2)
IMM GRANULOCYTES NFR BLD AUTO: 0.5 % — SIGNIFICANT CHANGE UP (ref 0–1.5)
KETONES UR-MCNC: ABNORMAL
LEUKOCYTE ESTERASE UR-ACNC: NEGATIVE — SIGNIFICANT CHANGE UP
LYMPHOCYTES # BLD AUTO: 1.11 K/UL — SIGNIFICANT CHANGE UP (ref 1–3.3)
LYMPHOCYTES # BLD AUTO: 9.2 % — LOW (ref 13–44)
MAGNESIUM SERPL-MCNC: 2 MG/DL — SIGNIFICANT CHANGE UP (ref 1.6–2.6)
MCHC RBC-ENTMCNC: 27.5 PG — SIGNIFICANT CHANGE UP (ref 27–34)
MCHC RBC-ENTMCNC: 31.4 GM/DL — LOW (ref 32–36)
MCV RBC AUTO: 87.6 FL — SIGNIFICANT CHANGE UP (ref 80–100)
MONOCYTES # BLD AUTO: 0.94 K/UL — HIGH (ref 0–0.9)
MONOCYTES NFR BLD AUTO: 7.8 % — SIGNIFICANT CHANGE UP (ref 2–14)
NEUTROPHILS # BLD AUTO: 9.9 K/UL — HIGH (ref 1.8–7.4)
NEUTROPHILS NFR BLD AUTO: 81.9 % — HIGH (ref 43–77)
NITRITE UR-MCNC: NEGATIVE — SIGNIFICANT CHANGE UP
NRBC # BLD: 0 /100 WBCS — SIGNIFICANT CHANGE UP (ref 0–0)
PH UR: 6 — SIGNIFICANT CHANGE UP (ref 5–8)
PHOSPHATE SERPL-MCNC: 1.4 MG/DL — LOW (ref 2.5–4.5)
PLATELET # BLD AUTO: 185 K/UL — SIGNIFICANT CHANGE UP (ref 150–400)
POTASSIUM SERPL-MCNC: 4 MMOL/L — SIGNIFICANT CHANGE UP (ref 3.5–5.3)
POTASSIUM SERPL-SCNC: 4 MMOL/L — SIGNIFICANT CHANGE UP (ref 3.5–5.3)
PROT UR-MCNC: ABNORMAL
RBC # BLD: 4.61 M/UL — SIGNIFICANT CHANGE UP (ref 4.2–5.8)
RBC # FLD: 13.7 % — SIGNIFICANT CHANGE UP (ref 10.3–14.5)
RBC CASTS # UR COMP ASSIST: 15 /HPF — HIGH (ref 0–4)
SODIUM SERPL-SCNC: 138 MMOL/L — SIGNIFICANT CHANGE UP (ref 135–145)
SP GR SPEC: 1.01 — SIGNIFICANT CHANGE UP (ref 1.01–1.02)
UROBILINOGEN FLD QL: NEGATIVE — SIGNIFICANT CHANGE UP
WBC # BLD: 12.08 K/UL — HIGH (ref 3.8–10.5)
WBC # FLD AUTO: 12.08 K/UL — HIGH (ref 3.8–10.5)
WBC UR QL: 1 /HPF — SIGNIFICANT CHANGE UP (ref 0–5)

## 2020-08-13 PROCEDURE — 71045 X-RAY EXAM CHEST 1 VIEW: CPT | Mod: 26

## 2020-08-13 PROCEDURE — 74177 CT ABD & PELVIS W/CONTRAST: CPT | Mod: 26

## 2020-08-13 PROCEDURE — 88307 TISSUE EXAM BY PATHOLOGIST: CPT | Mod: 26

## 2020-08-13 PROCEDURE — 44143 PARTIAL REMOVAL OF COLON: CPT | Mod: 78

## 2020-08-13 RX ORDER — DEXTROSE MONOHYDRATE, SODIUM CHLORIDE, AND POTASSIUM CHLORIDE 50; .745; 4.5 G/1000ML; G/1000ML; G/1000ML
1000 INJECTION, SOLUTION INTRAVENOUS
Refills: 0 | Status: DISCONTINUED | OUTPATIENT
Start: 2020-08-13 | End: 2020-08-13

## 2020-08-13 RX ORDER — ONDANSETRON 8 MG/1
4 TABLET, FILM COATED ORAL ONCE
Refills: 0 | Status: DISCONTINUED | OUTPATIENT
Start: 2020-08-13 | End: 2020-08-13

## 2020-08-13 RX ORDER — MORPHINE SULFATE 50 MG/1
2 CAPSULE, EXTENDED RELEASE ORAL EVERY 4 HOURS
Refills: 0 | Status: DISCONTINUED | OUTPATIENT
Start: 2020-08-13 | End: 2020-08-14

## 2020-08-13 RX ORDER — PYRIDOSTIGMINE BROMIDE 60 MG/5ML
60 SOLUTION ORAL
Refills: 0 | Status: DISCONTINUED | OUTPATIENT
Start: 2020-08-13 | End: 2020-08-19

## 2020-08-13 RX ORDER — FENTANYL CITRATE 50 UG/ML
25 INJECTION INTRAVENOUS
Refills: 0 | Status: DISCONTINUED | OUTPATIENT
Start: 2020-08-13 | End: 2020-08-13

## 2020-08-13 RX ORDER — SODIUM,POTASSIUM PHOSPHATES 278-250MG
1 POWDER IN PACKET (EA) ORAL
Refills: 0 | Status: DISCONTINUED | OUTPATIENT
Start: 2020-08-13 | End: 2020-08-13

## 2020-08-13 RX ORDER — SODIUM CHLORIDE 9 MG/ML
1000 INJECTION, SOLUTION INTRAVENOUS
Refills: 0 | Status: DISCONTINUED | OUTPATIENT
Start: 2020-08-13 | End: 2020-08-14

## 2020-08-13 RX ORDER — SODIUM CHLORIDE 9 MG/ML
1000 INJECTION, SOLUTION INTRAVENOUS
Refills: 0 | Status: DISCONTINUED | OUTPATIENT
Start: 2020-08-13 | End: 2020-08-13

## 2020-08-13 RX ORDER — PYRIDOSTIGMINE BROMIDE 60 MG/5ML
180 SOLUTION ORAL AT BEDTIME
Refills: 0 | Status: DISCONTINUED | OUTPATIENT
Start: 2020-08-13 | End: 2020-08-19

## 2020-08-13 RX ORDER — MORPHINE SULFATE 50 MG/1
4 CAPSULE, EXTENDED RELEASE ORAL
Refills: 0 | Status: DISCONTINUED | OUTPATIENT
Start: 2020-08-13 | End: 2020-08-13

## 2020-08-13 RX ORDER — ENOXAPARIN SODIUM 100 MG/ML
40 INJECTION SUBCUTANEOUS EVERY 24 HOURS
Refills: 0 | Status: DISCONTINUED | OUTPATIENT
Start: 2020-08-13 | End: 2020-08-19

## 2020-08-13 RX ORDER — PIPERACILLIN AND TAZOBACTAM 4; .5 G/20ML; G/20ML
3.38 INJECTION, POWDER, LYOPHILIZED, FOR SOLUTION INTRAVENOUS EVERY 8 HOURS
Refills: 0 | Status: DISCONTINUED | OUTPATIENT
Start: 2020-08-13 | End: 2020-08-17

## 2020-08-13 RX ORDER — ACETAMINOPHEN 500 MG
1000 TABLET ORAL EVERY 6 HOURS
Refills: 0 | Status: COMPLETED | OUTPATIENT
Start: 2020-08-13 | End: 2020-08-14

## 2020-08-13 RX ADMIN — OXYCODONE HYDROCHLORIDE 5 MILLIGRAM(S): 5 TABLET ORAL at 05:43

## 2020-08-13 RX ADMIN — Medication 975 MILLIGRAM(S): at 05:41

## 2020-08-13 RX ADMIN — Medication 400 MILLIGRAM(S): at 19:19

## 2020-08-13 RX ADMIN — PYRIDOSTIGMINE BROMIDE 60 MILLIGRAM(S): 60 SOLUTION ORAL at 05:15

## 2020-08-13 RX ADMIN — MORPHINE SULFATE 2 MILLIGRAM(S): 50 CAPSULE, EXTENDED RELEASE ORAL at 21:44

## 2020-08-13 RX ADMIN — SODIUM CHLORIDE 125 MILLILITER(S): 9 INJECTION, SOLUTION INTRAVENOUS at 16:55

## 2020-08-13 RX ADMIN — OXYCODONE HYDROCHLORIDE 5 MILLIGRAM(S): 5 TABLET ORAL at 05:13

## 2020-08-13 RX ADMIN — Medication 1000 MILLIGRAM(S): at 19:49

## 2020-08-13 RX ADMIN — Medication 1 TABLET(S): at 09:16

## 2020-08-13 RX ADMIN — PYRIDOSTIGMINE BROMIDE 180 MILLIGRAM(S): 60 SOLUTION ORAL at 21:37

## 2020-08-13 RX ADMIN — MORPHINE SULFATE 4 MILLIGRAM(S): 50 CAPSULE, EXTENDED RELEASE ORAL at 18:05

## 2020-08-13 RX ADMIN — Medication 127.5 MILLIMOLE(S): at 08:36

## 2020-08-13 RX ADMIN — Medication 975 MILLIGRAM(S): at 06:11

## 2020-08-13 RX ADMIN — PIPERACILLIN AND TAZOBACTAM 25 GRAM(S): 4; .5 INJECTION, POWDER, LYOPHILIZED, FOR SOLUTION INTRAVENOUS at 18:00

## 2020-08-13 RX ADMIN — Medication 975 MILLIGRAM(S): at 00:30

## 2020-08-13 RX ADMIN — Medication 975 MILLIGRAM(S): at 00:00

## 2020-08-13 RX ADMIN — MORPHINE SULFATE 2 MILLIGRAM(S): 50 CAPSULE, EXTENDED RELEASE ORAL at 22:14

## 2020-08-13 RX ADMIN — MORPHINE SULFATE 4 MILLIGRAM(S): 50 CAPSULE, EXTENDED RELEASE ORAL at 18:15

## 2020-08-13 NOTE — BRIEF OPERATIVE NOTE - NSICDXBRIEFPREOP_GEN_ALL_CORE_FT
PRE-OP DIAGNOSIS:  Diverticulitis of large intestine with abscess 10-Aug-2020 17:34:00  Sapphire Sanon
PRE-OP DIAGNOSIS:  Large bowel anastomotic leak 13-Aug-2020 15:57:11  Sapphire Sanon

## 2020-08-13 NOTE — BRIEF OPERATIVE NOTE - OPERATION/FINDINGS
Exploratory laparotomy, with bg feculent contamination, hole identified in proximal colon at anastomosis, on the anterior aspect of staple line. Anastomosis resected with a curved contour stapler, prolene suture used to vicky rectal stump, end colostomy matured. Additional descending colon resected due to concern for ischemia.
Large phlegmonous mass in sigmoid, with abscess present at time of surgery, purulence drained. EEA 29 at top of rectum.

## 2020-08-13 NOTE — PRE-ANESTHESIA EVALUATION ADULT - NSANTHOSAYNRD_GEN_A_CORE
No. PRINCE screening performed.  STOP BANG Legend: 0-2 = LOW Risk; 3-4 = INTERMEDIATE Risk; 5-8 = HIGH Risk
No. PRINCE screening performed.  STOP BANG Legend: 0-2 = LOW Risk; 3-4 = INTERMEDIATE Risk; 5-8 = HIGH Risk

## 2020-08-13 NOTE — BRIEF OPERATIVE NOTE - NSICDXBRIEFPROCEDURE_GEN_ALL_CORE_FT
PROCEDURES:  Colectomy, partial, robot-assisted, laparoscopic, with low anterior resection of rectum 10-Aug-2020 17:33:41  Sapphire Sanon
PROCEDURES:  Bryn procedure 13-Aug-2020 15:56:46  Sapphire Sanon

## 2020-08-13 NOTE — CHART NOTE - NSCHARTNOTEFT_GEN_A_CORE
SURGERY Post- Op Note    SUBJECTIVE / 24H EVENTS:  Patient seen and examined on floor 4 hours s/p Gomez's procedure 2/2 large bowel anastomotic leak.  No acute events since surgery. Afebrile, HD stable, reports minimal abd pain, denies chest pain, SOB. Voided 600 through ordaz.  GI fx (  )      OBJECTIVE:  VITAL SIGNS:  T(C): 37.1 (20 @ 22:00), Max: 38.1 (20 @ 05:12)  HR: 90 (20 @ 22:00) (75 - 97)  BP: 138/77 (20 @ 22:00) (115/58 - 191/78)  RR: 18 (20 @ 22:00) (14 - 18)  SpO2: 97% (20 @ 22:00) (96% - 100%)  Daily Height in cm: 175 (13 Aug 2020 12:51)    Daily       PHYSICAL EXAM:  Gen: NAD  LS: Respirations unlabored   GI: Soft. Nontender. Nondistended. Lap sites c/d/i, dressings intact. Ostomy pink and perfused,   Ext: Warm, well perfused      20 @ 07:01  -  20 @ 07:00  --------------------------------------------------------  IN:    Oral Fluid: 1100 mL  Total IN: 1100 mL    OUT:    Voided: 1600 mL  Total OUT: 1600 mL    Total NET: -500 mL      20 @ 07:01  -  20 @ 22:35  --------------------------------------------------------  IN:    lactated ringers.: 250 mL    Solution: 25 mL  Total IN: 275 mL    OUT:    Indwelling Catheter - Urethral: 1100 mL    Voided: 300 mL  Total OUT: 1400 mL    Total NET: -1125 mL          LAB VALUES:      138  |  100  |  9   ----------------------------<  89  4.0   |  24  |  1.25    Ca    9.3      13 Aug 2020 06:25  Phos  1.4     08-13  Mg     2.0     08-                                 12.7   12.08 )-----------( 185      ( 13 Aug 2020 06:25 )             40.4           Urinalysis Basic - ( 13 Aug 2020 09:07 )    Color: Light Yellow / Appearance: Clear / S.014 / pH: x  Gluc: x / Ketone: Small  / Bili: Negative / Urobili: Negative   Blood: x / Protein: 30 mg/dL / Nitrite: Negative   Leuk Esterase: Negative / RBC: 15 /hpf / WBC 1 /HPF   Sq Epi: x / Non Sq Epi: 0 /hpf / Bacteria: Negative        MICROBIOLOGY:      RADIOLOGY:  PACS Image: Image(s) Available (20 @ 11:58)  PACS Image: Image(s) Available (20 @ 07:22)        MEDICATIONS  (STANDING):  acetaminophen  IVPB .. 1000 milliGRAM(s) IV Intermittent every 6 hours  enoxaparin Injectable 40 milliGRAM(s) SubCutaneous every 24 hours  lactated ringers. 1000 milliLiter(s) (125 mL/Hr) IV Continuous <Continuous>  piperacillin/tazobactam IVPB.. 3.375 Gram(s) IV Intermittent every 8 hours  pyridostigmine 60 milliGRAM(s) Oral four times a day  pyridostigmine  milliGRAM(s) Oral at bedtime    MEDICATIONS  (PRN):  morphine  - Injectable 2 milliGRAM(s) IV Push every 4 hours PRN Severe Pain (7 - 10)    65 F POD3 s/p ERAS robot-assisted LAR and sigmoid colectomy for diverticulitis, with anastomotic leak found on CT, now 4 hours s/p Hartmans procedure  - NPO  - VTE ppx  - maintenance fluids  - IS  - Am labs    Red Sx SURGERY Post- Op Note    SUBJECTIVE / 24H EVENTS:  Patient seen and examined on floor 4 hours s/p Gomez's procedure 2/2 large bowel anastomotic leak.  No acute events since surgery. Afebrile, HD stable, reports moderate abd pain, some burping, denies chest pain, SOB. Voided 600 through ordaz.  Ostomy fx ( -,- )      OBJECTIVE:  VITAL SIGNS:  T(C): 37.1 (20 @ 22:00), Max: 38.1 (20 @ 05:12)  HR: 90 (20 @ 22:00) (75 - 97)  BP: 138/77 (20 @ 22:00) (115/58 - 191/78)  RR: 18 (20 @ 22:00) (14 - 18)  SpO2: 97% (20 @ 22:00) (96% - 100%)  Daily Height in cm: 175 (13 Aug 2020 12:51)    Daily       PHYSICAL EXAM:  Gen: NAD  LS: Respirations unlabored   GI: Soft. Nontender. Nondistended. Midline incision closed and hemostatic, dressing intact, non- saturated. Ostomy pink and perfused, bag empty  Ext: Warm, well perfused      20 @ 07:01  -  20 @ 07:00  --------------------------------------------------------  IN:    Oral Fluid: 1100 mL  Total IN: 1100 mL    OUT:    Voided: 1600 mL  Total OUT: 1600 mL    Total NET: -500 mL      20 @ 07:01  -  20 @ 22:35  --------------------------------------------------------  IN:    lactated ringers.: 250 mL    Solution: 25 mL  Total IN: 275 mL    OUT:    Indwelling Catheter - Urethral: 1100 mL    Voided: 300 mL  Total OUT: 1400 mL    Total NET: -1125 mL          LAB VALUES:  08-13    138  |  100  |  9   ----------------------------<  89  4.0   |  24  |  1.25    Ca    9.3      13 Aug 2020 06:25  Phos  1.4     08-13  Mg     2.0     08-                                 12.7   12.08 )-----------( 185      ( 13 Aug 2020 06:25 )             40.4           Urinalysis Basic - ( 13 Aug 2020 09:07 )    Color: Light Yellow / Appearance: Clear / S.014 / pH: x  Gluc: x / Ketone: Small  / Bili: Negative / Urobili: Negative   Blood: x / Protein: 30 mg/dL / Nitrite: Negative   Leuk Esterase: Negative / RBC: 15 /hpf / WBC 1 /HPF   Sq Epi: x / Non Sq Epi: 0 /hpf / Bacteria: Negative        MICROBIOLOGY:      RADIOLOGY:  PACS Image: Image(s) Available (20 @ 11:58)  PACS Image: Image(s) Available (20 @ 07:22)        MEDICATIONS  (STANDING):  acetaminophen  IVPB .. 1000 milliGRAM(s) IV Intermittent every 6 hours  enoxaparin Injectable 40 milliGRAM(s) SubCutaneous every 24 hours  lactated ringers. 1000 milliLiter(s) (125 mL/Hr) IV Continuous <Continuous>  piperacillin/tazobactam IVPB.. 3.375 Gram(s) IV Intermittent every 8 hours  pyridostigmine 60 milliGRAM(s) Oral four times a day  pyridostigmine  milliGRAM(s) Oral at bedtime    MEDICATIONS  (PRN):  morphine  - Injectable 2 milliGRAM(s) IV Push every 4 hours PRN Severe Pain (7 - 10)    65 F POD3 s/p ERAS robot-assisted LAR and sigmoid colectomy for diverticulitis, with anastomotic leak found on CT, now 4 hours s/p Hartmans procedure, recovering appropriately on floor  - NPO  - VTE ppx  - maintenance fluids  - IS  - Am labs    Red Sx

## 2020-08-13 NOTE — BRIEF OPERATIVE NOTE - NSICDXBRIEFPOSTOP_GEN_ALL_CORE_FT
POST-OP DIAGNOSIS:  Diverticulitis of large intestine with abscess 10-Aug-2020 17:34:10  Sapphire Sanon
POST-OP DIAGNOSIS:  Large bowel anastomotic leak 13-Aug-2020 15:57:19  Sapphire Sanon

## 2020-08-13 NOTE — PROGRESS NOTE ADULT - ASSESSMENT
64yo M with PMH of myasthenia gravis POD3 s/p ERAS robot-assisted LAR and sigmoid colectomy for diverticulitis    - f/u fever workup  - continue LRD  - f/u AM labs  - IS  - DVT ppx    Red surgery, pager #[phone]

## 2020-08-13 NOTE — PROGRESS NOTE ADULT - SUBJECTIVE AND OBJECTIVE BOX
COLORECTAL SURGERY DAILY PROGRESS NOTE:       Subjective / Overnight events:  Febrile at 5AM, fever workup performed.  Pt given tylenol.  Denies N/V.  Tolerating diet.  +flatus, +BM.      Objective:      MEDICATIONS  (STANDING):  acetaminophen   Tablet .. 975 milliGRAM(s) Oral every 6 hours  enoxaparin Injectable 40 milliGRAM(s) SubCutaneous every 24 hours  potassium phosphate / sodium phosphate Tablet (K-PHOS No. 2) 1 Tablet(s) Oral every 2 hours  pyridostigmine 60 milliGRAM(s) Oral four times a day  pyridostigmine  milliGRAM(s) Oral at bedtime  sodium phosphate IVPB 30 milliMole(s) IV Intermittent once    MEDICATIONS  (PRN):  naloxone Injectable 0.1 milliGRAM(s) IV Push every 3 minutes PRN For ANY of the following changes in patient status:  A. RR LESS THAN 10 breaths per minute, B. Oxygen saturation LESS THAN 90%, C. Sedation score of 6  ondansetron Injectable 4 milliGRAM(s) IV Push every 6 hours PRN Nausea  oxyCODONE    IR 5 milliGRAM(s) Oral every 4 hours PRN Moderate Pain (4 - 6)  oxyCODONE    IR 10 milliGRAM(s) Oral every 4 hours PRN Severe Pain (7 - 10)      Vital Signs Last 24 Hrs  T(C): 38.1 (13 Aug 2020 05:12), Max: 38.1 (13 Aug 2020 05:12)  T(F): 100.6 (13 Aug 2020 05:12), Max: 100.6 (13 Aug 2020 05:12)  HR: 90 (13 Aug 2020 06:26) (70 - 90)  BP: 163/84 (13 Aug 2020 06:26) (155/73 - 191/78)  BP(mean): --  RR: 18 (13 Aug 2020 05:12) (18 - 18)  SpO2: 97% (13 Aug 2020 05:12) (97% - 100%)    I&O's Detail    12 Aug 2020 07:01  -  13 Aug 2020 07:00  --------------------------------------------------------  IN:    Oral Fluid: 1100 mL  Total IN: 1100 mL    OUT:    Voided: 1600 mL  Total OUT: 1600 mL    Total NET: -500 mL          Daily     Daily     LABS:                        12.7   12.08 )-----------( 185      ( 13 Aug 2020 06:25 )             40.4     08-13    138  |  100  |  9   ----------------------------<  89  4.0   |  24  |  1.25    Ca    9.3      13 Aug 2020 06:25  Phos  1.4     08-13  Mg     2.0     08-13                Physical Examination:  GEN: NAD, resting quietly  PULM: symmetric chest rise bilaterally, no increased WOB  ABD: soft, appropriately tender, nondistended, incision CDI, no rebound or guarding  EXTR: no LE erythema, moving all extremities

## 2020-08-13 NOTE — PROVIDER CONTACT NOTE (OTHER) - ACTION/TREATMENT ORDERED:
Give patient Tylenol for fever & reassess BP after analgesic administration. Will order fFever workup. Cont to monitor. Give patient Tylenol for fever & reassess BP after analgesic administration. Will order Fever workup. Cont to monitor.

## 2020-08-13 NOTE — PRE-ANESTHESIA EVALUATION ADULT - ANESTHESIA, PREVIOUS REACTION, PROFILE
none/denies personal/family history of anesthesia complications
denies personal/family history of anesthesia complications/none

## 2020-08-14 LAB
ANION GAP SERPL CALC-SCNC: 11 MMOL/L — SIGNIFICANT CHANGE UP (ref 5–17)
BUN SERPL-MCNC: 8 MG/DL — SIGNIFICANT CHANGE UP (ref 7–23)
CALCIUM SERPL-MCNC: 8.4 MG/DL — SIGNIFICANT CHANGE UP (ref 8.4–10.5)
CHLORIDE SERPL-SCNC: 101 MMOL/L — SIGNIFICANT CHANGE UP (ref 96–108)
CO2 SERPL-SCNC: 24 MMOL/L — SIGNIFICANT CHANGE UP (ref 22–31)
CREAT SERPL-MCNC: 1.27 MG/DL — SIGNIFICANT CHANGE UP (ref 0.5–1.3)
CULTURE RESULTS: SIGNIFICANT CHANGE UP
GLUCOSE SERPL-MCNC: 83 MG/DL — SIGNIFICANT CHANGE UP (ref 70–99)
HCT VFR BLD CALC: 38.5 % — LOW (ref 39–50)
HGB BLD-MCNC: 12.4 G/DL — LOW (ref 13–17)
MAGNESIUM SERPL-MCNC: 1.9 MG/DL — SIGNIFICANT CHANGE UP (ref 1.6–2.6)
MCHC RBC-ENTMCNC: 27.7 PG — SIGNIFICANT CHANGE UP (ref 27–34)
MCHC RBC-ENTMCNC: 32.2 GM/DL — SIGNIFICANT CHANGE UP (ref 32–36)
MCV RBC AUTO: 86.1 FL — SIGNIFICANT CHANGE UP (ref 80–100)
NRBC # BLD: 0 /100 WBCS — SIGNIFICANT CHANGE UP (ref 0–0)
PHOSPHATE SERPL-MCNC: 3.1 MG/DL — SIGNIFICANT CHANGE UP (ref 2.5–4.5)
PLATELET # BLD AUTO: 245 K/UL — SIGNIFICANT CHANGE UP (ref 150–400)
POTASSIUM SERPL-MCNC: 4.4 MMOL/L — SIGNIFICANT CHANGE UP (ref 3.5–5.3)
POTASSIUM SERPL-SCNC: 4.4 MMOL/L — SIGNIFICANT CHANGE UP (ref 3.5–5.3)
RBC # BLD: 4.47 M/UL — SIGNIFICANT CHANGE UP (ref 4.2–5.8)
RBC # FLD: 13.7 % — SIGNIFICANT CHANGE UP (ref 10.3–14.5)
SODIUM SERPL-SCNC: 136 MMOL/L — SIGNIFICANT CHANGE UP (ref 135–145)
SPECIMEN SOURCE: SIGNIFICANT CHANGE UP
WBC # BLD: 8.4 K/UL — SIGNIFICANT CHANGE UP (ref 3.8–10.5)
WBC # FLD AUTO: 8.4 K/UL — SIGNIFICANT CHANGE UP (ref 3.8–10.5)

## 2020-08-14 RX ORDER — NALOXONE HYDROCHLORIDE 4 MG/.1ML
0.1 SPRAY NASAL
Refills: 0 | Status: DISCONTINUED | OUTPATIENT
Start: 2020-08-14 | End: 2020-08-17

## 2020-08-14 RX ORDER — DEXTROSE MONOHYDRATE, SODIUM CHLORIDE, AND POTASSIUM CHLORIDE 50; .745; 4.5 G/1000ML; G/1000ML; G/1000ML
1000 INJECTION, SOLUTION INTRAVENOUS
Refills: 0 | Status: DISCONTINUED | OUTPATIENT
Start: 2020-08-14 | End: 2020-08-18

## 2020-08-14 RX ORDER — MAGNESIUM SULFATE 500 MG/ML
1 VIAL (ML) INJECTION ONCE
Refills: 0 | Status: COMPLETED | OUTPATIENT
Start: 2020-08-14 | End: 2020-08-14

## 2020-08-14 RX ORDER — ONDANSETRON 8 MG/1
4 TABLET, FILM COATED ORAL EVERY 6 HOURS
Refills: 0 | Status: DISCONTINUED | OUTPATIENT
Start: 2020-08-14 | End: 2020-08-17

## 2020-08-14 RX ORDER — HYDROMORPHONE HYDROCHLORIDE 2 MG/ML
0.5 INJECTION INTRAMUSCULAR; INTRAVENOUS; SUBCUTANEOUS
Refills: 0 | Status: DISCONTINUED | OUTPATIENT
Start: 2020-08-14 | End: 2020-08-17

## 2020-08-14 RX ORDER — HYDROMORPHONE HYDROCHLORIDE 2 MG/ML
30 INJECTION INTRAMUSCULAR; INTRAVENOUS; SUBCUTANEOUS
Refills: 0 | Status: DISCONTINUED | OUTPATIENT
Start: 2020-08-14 | End: 2020-08-17

## 2020-08-14 RX ORDER — HYDROMORPHONE HYDROCHLORIDE 2 MG/ML
0.5 INJECTION INTRAMUSCULAR; INTRAVENOUS; SUBCUTANEOUS ONCE
Refills: 0 | Status: DISCONTINUED | OUTPATIENT
Start: 2020-08-14 | End: 2020-08-14

## 2020-08-14 RX ADMIN — HYDROMORPHONE HYDROCHLORIDE 30 MILLILITER(S): 2 INJECTION INTRAMUSCULAR; INTRAVENOUS; SUBCUTANEOUS at 14:59

## 2020-08-14 RX ADMIN — PIPERACILLIN AND TAZOBACTAM 25 GRAM(S): 4; .5 INJECTION, POWDER, LYOPHILIZED, FOR SOLUTION INTRAVENOUS at 17:39

## 2020-08-14 RX ADMIN — MORPHINE SULFATE 2 MILLIGRAM(S): 50 CAPSULE, EXTENDED RELEASE ORAL at 06:10

## 2020-08-14 RX ADMIN — Medication 400 MILLIGRAM(S): at 17:39

## 2020-08-14 RX ADMIN — Medication 1000 MILLIGRAM(S): at 11:40

## 2020-08-14 RX ADMIN — Medication 400 MILLIGRAM(S): at 05:35

## 2020-08-14 RX ADMIN — PYRIDOSTIGMINE BROMIDE 60 MILLIGRAM(S): 60 SOLUTION ORAL at 13:40

## 2020-08-14 RX ADMIN — Medication 400 MILLIGRAM(S): at 11:17

## 2020-08-14 RX ADMIN — PIPERACILLIN AND TAZOBACTAM 25 GRAM(S): 4; .5 INJECTION, POWDER, LYOPHILIZED, FOR SOLUTION INTRAVENOUS at 10:49

## 2020-08-14 RX ADMIN — HYDROMORPHONE HYDROCHLORIDE 30 MILLILITER(S): 2 INJECTION INTRAMUSCULAR; INTRAVENOUS; SUBCUTANEOUS at 18:27

## 2020-08-14 RX ADMIN — Medication 100 GRAM(S): at 09:43

## 2020-08-14 RX ADMIN — PYRIDOSTIGMINE BROMIDE 60 MILLIGRAM(S): 60 SOLUTION ORAL at 05:35

## 2020-08-14 RX ADMIN — DEXTROSE MONOHYDRATE, SODIUM CHLORIDE, AND POTASSIUM CHLORIDE 125 MILLILITER(S): 50; .745; 4.5 INJECTION, SOLUTION INTRAVENOUS at 17:46

## 2020-08-14 RX ADMIN — Medication 1000 MILLIGRAM(S): at 06:05

## 2020-08-14 RX ADMIN — HYDROMORPHONE HYDROCHLORIDE 0.5 MILLIGRAM(S): 2 INJECTION INTRAMUSCULAR; INTRAVENOUS; SUBCUTANEOUS at 10:20

## 2020-08-14 RX ADMIN — ENOXAPARIN SODIUM 40 MILLIGRAM(S): 100 INJECTION SUBCUTANEOUS at 05:35

## 2020-08-14 RX ADMIN — HYDROMORPHONE HYDROCHLORIDE 0.5 MILLIGRAM(S): 2 INJECTION INTRAMUSCULAR; INTRAVENOUS; SUBCUTANEOUS at 10:01

## 2020-08-14 RX ADMIN — MORPHINE SULFATE 2 MILLIGRAM(S): 50 CAPSULE, EXTENDED RELEASE ORAL at 05:40

## 2020-08-14 RX ADMIN — PYRIDOSTIGMINE BROMIDE 60 MILLIGRAM(S): 60 SOLUTION ORAL at 17:45

## 2020-08-14 RX ADMIN — PIPERACILLIN AND TAZOBACTAM 25 GRAM(S): 4; .5 INJECTION, POWDER, LYOPHILIZED, FOR SOLUTION INTRAVENOUS at 02:24

## 2020-08-14 RX ADMIN — ENOXAPARIN SODIUM 40 MILLIGRAM(S): 100 INJECTION SUBCUTANEOUS at 21:36

## 2020-08-14 NOTE — PROGRESS NOTE ADULT - ASSESSMENT
ASSESSMENT: 64yo M with myasthenia gravis POD4 from LAR for sigmoid diverticulitis complicated by anastomic leak now POD1 from RTOR for exlap, washout and Bryn's procedure. Patient is stable and recovering appropriately with no evidence of return of bowel function yet.    PLAN:    NEURO: Pain control as needed. Continue pyridostigmine for myasthenia gravis.  CV: HDS  PULM: Satting well on RA  GI: NPO/IVF. Needs ostomy teaching.  : Ordaz with adequate UOP. Will remove ordaz today.  ID: continue zosyn  HEME: DVT ppx  DISPO: floor    [place], PGY-1  Red Team Surgery, [id number]

## 2020-08-14 NOTE — PROGRESS NOTE ADULT - ASSESSMENT
s/p robotic LAR with anastomotic leak and takeback for exlap and haas's procedure    pain control, possible PCA today  sips/chips  OOB  d/c ordaz

## 2020-08-14 NOTE — PROGRESS NOTE ADULT - SUBJECTIVE AND OBJECTIVE BOX
POD #4/POD#1    24hr events:  - Taken to OR for emergent Bryn's due to anastomic leak    Overnight events:   - No acute events    SUBJECTIVE:  Patient feels well this AM. Has not been OOB. Denies nausea, vomiting, fevers or chills.     OBJECTIVE:  Vital Signs Last 24 Hrs  T(C): 37.5 (14 Aug 2020 05:24), Max: 37.5 (13 Aug 2020 20:05)  T(F): 99.5 (14 Aug 2020 05:24), Max: 99.5 (13 Aug 2020 20:05)  HR: 82 (14 Aug 2020 05:24) (75 - 97)  BP: 163/74 (14 Aug 2020 05:24) (115/58 - 163/83)  BP(mean): 85 (13 Aug 2020 18:00) (74 - 90)  RR: 18 (14 Aug 2020 05:24) (14 - 18)  SpO2: 97% (14 Aug 2020 05:24) (96% - 100%)    Physical Examination:  GEN: NAD, resting quietly  PULM: symmetric chest rise bilaterally, no increased WOB  ABD: soft, appropriately tender, nondistended, incision CDI, ostomy bag with succus, no stool or gas  EXTR: no LE erythema, moving all extremities    Tubes/Lines/Drains: ordaz in place with clear yellow urine    LABS:                        12.7   12.08 )-----------( 185      ( 13 Aug 2020 06:25 )             40.4       08-13    138  |  100  |  9   ----------------------------<  89  4.0   |  24  |  1.25    Ca    9.3      13 Aug 2020 06:25  Phos  1.4     08-13  Mg     2.0     08-13

## 2020-08-14 NOTE — PROGRESS NOTE ADULT - SUBJECTIVE AND OBJECTIVE BOX
INTERVAL HPI/OVERNIGHT EVENTS:  Patient is a 65yMale  Pt reports pain at the incision but overall feels better.     Vital Signs Last 24 Hrs  T(C): 37.5 (14 Aug 2020 05:24), Max: 37.5 (13 Aug 2020 20:05)  T(F): 99.5 (14 Aug 2020 05:24), Max: 99.5 (13 Aug 2020 20:05)  HR: 82 (14 Aug 2020 05:24) (75 - 97)  BP: 163/74 (14 Aug 2020 05:24) (115/58 - 163/83)  BP(mean): 85 (13 Aug 2020 18:00) (74 - 90)  RR: 18 (14 Aug 2020 05:24) (14 - 18)  SpO2: 97% (14 Aug 2020 05:24) (96% - 100%)  13 @ 07:01  -  08-14 @ 07:00  --------------------------------------------------------  IN: 275 mL / OUT: 2500 mL / NET: -2225 mL        PHYSICAL EXAM:  Constitutional: well developed, well nourished, NAD  Eyes: anicteric  ENMT: normal facies, symmetric  Neck: supple  Respiratory: CTA bilat  Cardiovascular: RRR  Gastrointestinal: abdomen soft, tender at incision, dressing c/d/i, min distended. No obvious masses. No peritonitis, colostomy edematous but pink and viable  Extremities: FROM, warm  Neurological: intact, non-focal  Skin: no gross lesions  Lymph Nodes: no gross adenopathy  Musculoskeletal: equal strength bilateral upper and lower extremities  Psychiatric: oriented x 3; appropriate          LABS:                        12.7   12.08 )-----------( 185      ( 13 Aug 2020 06:25 )             40.4     08-13    138  |  100  |  9   ----------------------------<  89  4.0   |  24  |  1.25    Ca    9.3      13 Aug 2020 06:25  Phos  1.4     08-13  Mg     2.0     08-13        Urinalysis Basic - ( 13 Aug 2020 09:07 )    Color: Light Yellow / Appearance: Clear / S.014 / pH: x  Gluc: x / Ketone: Small  / Bili: Negative / Urobili: Negative   Blood: x / Protein: 30 mg/dL / Nitrite: Negative   Leuk Esterase: Negative / RBC: 15 /hpf / WBC 1 /HPF   Sq Epi: x / Non Sq Epi: 0 /hpf / Bacteria: Negative

## 2020-08-15 LAB
ANION GAP SERPL CALC-SCNC: 10 MMOL/L — SIGNIFICANT CHANGE UP (ref 5–17)
BUN SERPL-MCNC: 11 MG/DL — SIGNIFICANT CHANGE UP (ref 7–23)
CALCIUM SERPL-MCNC: 8.9 MG/DL — SIGNIFICANT CHANGE UP (ref 8.4–10.5)
CHLORIDE SERPL-SCNC: 96 MMOL/L — SIGNIFICANT CHANGE UP (ref 96–108)
CO2 SERPL-SCNC: 27 MMOL/L — SIGNIFICANT CHANGE UP (ref 22–31)
CREAT SERPL-MCNC: 1.31 MG/DL — HIGH (ref 0.5–1.3)
GLUCOSE SERPL-MCNC: 108 MG/DL — HIGH (ref 70–99)
HCT VFR BLD CALC: 38.3 % — LOW (ref 39–50)
HGB BLD-MCNC: 12.2 G/DL — LOW (ref 13–17)
MAGNESIUM SERPL-MCNC: 2.2 MG/DL — SIGNIFICANT CHANGE UP (ref 1.6–2.6)
MCHC RBC-ENTMCNC: 27.8 PG — SIGNIFICANT CHANGE UP (ref 27–34)
MCHC RBC-ENTMCNC: 31.9 GM/DL — LOW (ref 32–36)
MCV RBC AUTO: 87.2 FL — SIGNIFICANT CHANGE UP (ref 80–100)
NRBC # BLD: 0 /100 WBCS — SIGNIFICANT CHANGE UP (ref 0–0)
PHOSPHATE SERPL-MCNC: 1.4 MG/DL — LOW (ref 2.5–4.5)
PLATELET # BLD AUTO: 294 K/UL — SIGNIFICANT CHANGE UP (ref 150–400)
POTASSIUM SERPL-MCNC: 4.3 MMOL/L — SIGNIFICANT CHANGE UP (ref 3.5–5.3)
POTASSIUM SERPL-SCNC: 4.3 MMOL/L — SIGNIFICANT CHANGE UP (ref 3.5–5.3)
RBC # BLD: 4.39 M/UL — SIGNIFICANT CHANGE UP (ref 4.2–5.8)
RBC # FLD: 13.9 % — SIGNIFICANT CHANGE UP (ref 10.3–14.5)
SODIUM SERPL-SCNC: 133 MMOL/L — LOW (ref 135–145)
WBC # BLD: 8.55 K/UL — SIGNIFICANT CHANGE UP (ref 3.8–10.5)
WBC # FLD AUTO: 8.55 K/UL — SIGNIFICANT CHANGE UP (ref 3.8–10.5)

## 2020-08-15 RX ORDER — AMLODIPINE BESYLATE 2.5 MG/1
5 TABLET ORAL DAILY
Refills: 0 | Status: DISCONTINUED | OUTPATIENT
Start: 2020-08-15 | End: 2020-08-16

## 2020-08-15 RX ORDER — AMLODIPINE BESYLATE 2.5 MG/1
5 TABLET ORAL DAILY
Refills: 0 | Status: DISCONTINUED | OUTPATIENT
Start: 2020-08-15 | End: 2020-08-15

## 2020-08-15 RX ADMIN — PYRIDOSTIGMINE BROMIDE 60 MILLIGRAM(S): 60 SOLUTION ORAL at 05:22

## 2020-08-15 RX ADMIN — DEXTROSE MONOHYDRATE, SODIUM CHLORIDE, AND POTASSIUM CHLORIDE 75 MILLILITER(S): 50; .745; 4.5 INJECTION, SOLUTION INTRAVENOUS at 23:41

## 2020-08-15 RX ADMIN — AMLODIPINE BESYLATE 5 MILLIGRAM(S): 2.5 TABLET ORAL at 09:40

## 2020-08-15 RX ADMIN — Medication 85 MILLIMOLE(S): at 11:31

## 2020-08-15 RX ADMIN — HYDROMORPHONE HYDROCHLORIDE 30 MILLILITER(S): 2 INJECTION INTRAMUSCULAR; INTRAVENOUS; SUBCUTANEOUS at 06:57

## 2020-08-15 RX ADMIN — HYDROMORPHONE HYDROCHLORIDE 30 MILLILITER(S): 2 INJECTION INTRAMUSCULAR; INTRAVENOUS; SUBCUTANEOUS at 16:52

## 2020-08-15 RX ADMIN — DEXTROSE MONOHYDRATE, SODIUM CHLORIDE, AND POTASSIUM CHLORIDE 125 MILLILITER(S): 50; .745; 4.5 INJECTION, SOLUTION INTRAVENOUS at 09:41

## 2020-08-15 RX ADMIN — PYRIDOSTIGMINE BROMIDE 60 MILLIGRAM(S): 60 SOLUTION ORAL at 23:41

## 2020-08-15 RX ADMIN — PIPERACILLIN AND TAZOBACTAM 25 GRAM(S): 4; .5 INJECTION, POWDER, LYOPHILIZED, FOR SOLUTION INTRAVENOUS at 17:10

## 2020-08-15 RX ADMIN — HYDROMORPHONE HYDROCHLORIDE 30 MILLILITER(S): 2 INJECTION INTRAMUSCULAR; INTRAVENOUS; SUBCUTANEOUS at 18:57

## 2020-08-15 RX ADMIN — PYRIDOSTIGMINE BROMIDE 60 MILLIGRAM(S): 60 SOLUTION ORAL at 11:32

## 2020-08-15 RX ADMIN — ENOXAPARIN SODIUM 40 MILLIGRAM(S): 100 INJECTION SUBCUTANEOUS at 20:17

## 2020-08-15 RX ADMIN — PIPERACILLIN AND TAZOBACTAM 25 GRAM(S): 4; .5 INJECTION, POWDER, LYOPHILIZED, FOR SOLUTION INTRAVENOUS at 09:41

## 2020-08-15 RX ADMIN — PYRIDOSTIGMINE BROMIDE 180 MILLIGRAM(S): 60 SOLUTION ORAL at 01:39

## 2020-08-15 RX ADMIN — PYRIDOSTIGMINE BROMIDE 180 MILLIGRAM(S): 60 SOLUTION ORAL at 21:31

## 2020-08-15 RX ADMIN — PIPERACILLIN AND TAZOBACTAM 25 GRAM(S): 4; .5 INJECTION, POWDER, LYOPHILIZED, FOR SOLUTION INTRAVENOUS at 01:39

## 2020-08-15 RX ADMIN — ONDANSETRON 4 MILLIGRAM(S): 8 TABLET, FILM COATED ORAL at 11:33

## 2020-08-15 NOTE — PROGRESS NOTE ADULT - SUBJECTIVE AND OBJECTIVE BOX
Day __ of Anesthesia Pain Management Service    SUBJECTIVE: Patient doing well with PCEA    Pain Scale Score:   Refer to charted pain scores    THERAPY:  [X] Epidural Bupivacaine 0.0625% and Hydromorphone         [X] 10 micrograms/mL 	[ ] 5 micrograms/mL  [ ] Epidural Bupivacaine 0.0625% and Fentanyl 2 micrograms/mL  [ ] Epidural Ropivacaine 0.1% plain – 1 mg/mL    Demand dose: 3 mL  Lockout: 15 minutes  Continuous Rate: 6 mL/hr    MEDICATIONS  (STANDING):  amLODIPine   Tablet 5 milliGRAM(s) Oral daily  dextrose 5% + sodium chloride 0.45% with potassium chloride 20 mEq/L 1000 milliLiter(s) (125 mL/Hr) IV Continuous <Continuous>  enoxaparin Injectable 40 milliGRAM(s) SubCutaneous every 24 hours  HYDROmorphone PCA (1 mG/mL) 30 milliLiter(s) PCA Continuous PCA Continuous  piperacillin/tazobactam IVPB.. 3.375 Gram(s) IV Intermittent every 8 hours  pyridostigmine 60 milliGRAM(s) Oral four times a day  pyridostigmine  milliGRAM(s) Oral at bedtime    MEDICATIONS  (PRN):  HYDROmorphone PCA (1 mG/mL) Rescue Clinician Bolus 0.5 milliGRAM(s) IV Push every 15 minutes PRN for Pain Scale GREATER THAN 6  naloxone Injectable 0.1 milliGRAM(s) IV Push every 3 minutes PRN For ANY of the following changes in patient status:  A. RR LESS THAN 10 breaths per minute, B. Oxygen saturation LESS THAN 90%, C. Sedation score of 6  ondansetron Injectable 4 milliGRAM(s) IV Push every 6 hours PRN Nausea      OBJECTIVE:    Assessment of Catheter Site:    [X] Epidural 	  [X] Dressing intact	[X] Site non-tender	[X] Site without erythema, discharge, edema  [X] Epidural tubing and connection checked	[X] Gross neurological exam within normal limits  [ ] Catheter removed – tip intact		[X] Afebrile	            [ ] Febrile: ___                          12.2   8.55  )-----------( 294      ( 15 Aug 2020 07:03 )             38.3     Vital Signs Last 24 Hrs  T(C): 37.1 (08-15-20 @ 04:37), Max: 37.6 (08-15-20 @ 00:33)  T(F): 98.8 (08-15-20 @ 04:37), Max: 99.6 (08-15-20 @ 00:33)  HR: 76 (08-15-20 @ 09:43) (69 - 82)  BP: 175/83 (08-15-20 @ 09:43) (155/80 - 175/83)  BP(mean): --  RR: 18 (08-15-20 @ 04:37) (18 - 18)  SpO2: 96% (08-15-20 @ 04:37) (93% - 97%)      Sedation Score:	[X] Alert	[ ] Drowsy	[ ] Arousable  [ ] Asleep     [ ] Unresponsive    Side Effects:	[X] None	[ ] Nausea	[ ] Vomiting   [ ] Pruritus  		[ ] Weakness     [ ] Numbness	[ ] Other:    ASSESSMENT/ PLAN:    Therapy:	[X] Continue   [ ] Discontinue   [ ] Change to PRN Analgesics   [ ] Change to PCA    Documentation and Verification of current medications:  [X] Done	[ ] Not done, not eligible, reason:

## 2020-08-15 NOTE — PROGRESS NOTE ADULT - SUBJECTIVE AND OBJECTIVE BOX
POD #5/POD#2    24hr events:  - Pain consult for PCA  - PT eval: no skilled needs    Overnight events:   - No acute events    SUBJECTIVE:  Patient feels well but still reports abd distension. Denies nausea, vomiting, fevers or chills. Patient states his pain is better controlled with the PCA.    OBJECTIVE:  Vital Signs Last 24 Hrs  T(C): 37.1 (15 Aug 2020 04:37), Max: 37.6 (15 Aug 2020 00:33)  T(F): 98.8 (15 Aug 2020 04:37), Max: 99.6 (15 Aug 2020 00:33)  HR: 74 (15 Aug 2020 04:37) (68 - 82)  BP: 169/89 (15 Aug 2020 04:37) (146/78 - 169/89)  BP(mean): --  RR: 18 (15 Aug 2020 04:37) (18 - 18)  SpO2: 96% (15 Aug 2020 04:37) (93% - 100%)    Physical Examination:  GEN: NAD, resting quietly  PULM: symmetric chest rise bilaterally, no increased WOB  ABD: soft, nontender, mildly distended, no rebound or guarding, ostomy bag empty  EXTR: no LE erythema, moving all extremities      LABS:                        12.2   8.55  )-----------( 294      ( 15 Aug 2020 07:03 )             38.3       08-15    133<L>  |  96  |  11  ----------------------------<  108<H>  4.3   |  27  |  1.31<H>    Ca    8.9      15 Aug 2020 07:03  Phos  1.4     08-15  Mg     2.2     08-15

## 2020-08-15 NOTE — PROGRESS NOTE ADULT - ASSESSMENT
ASSESSMENT: 66yo M with myasthenia gravis POD5 from LAR for sigmoid diverticulitis complicated by anastomic leak now POD2 from RTOR for exlap, washout and Bryn's procedure. Patient is stable and recovering appropriately with no evidence of return of bowel function yet.    PLAN:    NEURO: Pain control as needed. Continue PCA. Continue pyridostigmine for myasthenia gravis.  CV: HDS  PULM: Satting well on RA  GI: NPO/IVF. Ostomy non-functioning yet. Continue teaching with ostomy nurses.  : Voiding spontaneously.  ID: continue zosyn  HEME: DVT ppx  DISPO: floor. Per PT eval, can be discharged home with no needs when ready.    [place], PGY-1  Red Team Surgery, [id number]

## 2020-08-15 NOTE — PROVIDER CONTACT NOTE (OTHER) - SITUATION
5mg amlodipine administered for hypertension in the morning for BP 5mg amlodipine administered for hypertension 174/77  in the morning for BP

## 2020-08-16 LAB
ANION GAP SERPL CALC-SCNC: 11 MMOL/L — SIGNIFICANT CHANGE UP (ref 5–17)
BUN SERPL-MCNC: 8 MG/DL — SIGNIFICANT CHANGE UP (ref 7–23)
CALCIUM SERPL-MCNC: 8.9 MG/DL — SIGNIFICANT CHANGE UP (ref 8.4–10.5)
CHLORIDE SERPL-SCNC: 98 MMOL/L — SIGNIFICANT CHANGE UP (ref 96–108)
CO2 SERPL-SCNC: 25 MMOL/L — SIGNIFICANT CHANGE UP (ref 22–31)
CREAT SERPL-MCNC: 1.23 MG/DL — SIGNIFICANT CHANGE UP (ref 0.5–1.3)
GLUCOSE SERPL-MCNC: 93 MG/DL — SIGNIFICANT CHANGE UP (ref 70–99)
HCT VFR BLD CALC: 37.8 % — LOW (ref 39–50)
HGB BLD-MCNC: 12 G/DL — LOW (ref 13–17)
MAGNESIUM SERPL-MCNC: 1.9 MG/DL — SIGNIFICANT CHANGE UP (ref 1.6–2.6)
MCHC RBC-ENTMCNC: 27.8 PG — SIGNIFICANT CHANGE UP (ref 27–34)
MCHC RBC-ENTMCNC: 31.7 GM/DL — LOW (ref 32–36)
MCV RBC AUTO: 87.7 FL — SIGNIFICANT CHANGE UP (ref 80–100)
NRBC # BLD: 0 /100 WBCS — SIGNIFICANT CHANGE UP (ref 0–0)
PHOSPHATE SERPL-MCNC: 1.9 MG/DL — LOW (ref 2.5–4.5)
PLATELET # BLD AUTO: 321 K/UL — SIGNIFICANT CHANGE UP (ref 150–400)
POTASSIUM SERPL-MCNC: 4 MMOL/L — SIGNIFICANT CHANGE UP (ref 3.5–5.3)
POTASSIUM SERPL-SCNC: 4 MMOL/L — SIGNIFICANT CHANGE UP (ref 3.5–5.3)
RBC # BLD: 4.31 M/UL — SIGNIFICANT CHANGE UP (ref 4.2–5.8)
RBC # FLD: 13.8 % — SIGNIFICANT CHANGE UP (ref 10.3–14.5)
SODIUM SERPL-SCNC: 134 MMOL/L — LOW (ref 135–145)
WBC # BLD: 9.37 K/UL — SIGNIFICANT CHANGE UP (ref 3.8–10.5)
WBC # FLD AUTO: 9.37 K/UL — SIGNIFICANT CHANGE UP (ref 3.8–10.5)

## 2020-08-16 PROCEDURE — 93010 ELECTROCARDIOGRAM REPORT: CPT

## 2020-08-16 RX ORDER — AMLODIPINE BESYLATE 2.5 MG/1
5 TABLET ORAL ONCE
Refills: 0 | Status: COMPLETED | OUTPATIENT
Start: 2020-08-16 | End: 2020-08-16

## 2020-08-16 RX ORDER — AMLODIPINE BESYLATE 2.5 MG/1
10 TABLET ORAL DAILY
Refills: 0 | Status: DISCONTINUED | OUTPATIENT
Start: 2020-08-16 | End: 2020-08-19

## 2020-08-16 RX ORDER — HYDRALAZINE HCL 50 MG
10 TABLET ORAL ONCE
Refills: 0 | Status: COMPLETED | OUTPATIENT
Start: 2020-08-16 | End: 2020-08-16

## 2020-08-16 RX ADMIN — PIPERACILLIN AND TAZOBACTAM 25 GRAM(S): 4; .5 INJECTION, POWDER, LYOPHILIZED, FOR SOLUTION INTRAVENOUS at 02:22

## 2020-08-16 RX ADMIN — ENOXAPARIN SODIUM 40 MILLIGRAM(S): 100 INJECTION SUBCUTANEOUS at 21:26

## 2020-08-16 RX ADMIN — PYRIDOSTIGMINE BROMIDE 180 MILLIGRAM(S): 60 SOLUTION ORAL at 21:26

## 2020-08-16 RX ADMIN — Medication 10 MILLIGRAM(S): at 01:04

## 2020-08-16 RX ADMIN — AMLODIPINE BESYLATE 5 MILLIGRAM(S): 2.5 TABLET ORAL at 09:09

## 2020-08-16 RX ADMIN — PYRIDOSTIGMINE BROMIDE 60 MILLIGRAM(S): 60 SOLUTION ORAL at 11:15

## 2020-08-16 RX ADMIN — PIPERACILLIN AND TAZOBACTAM 25 GRAM(S): 4; .5 INJECTION, POWDER, LYOPHILIZED, FOR SOLUTION INTRAVENOUS at 09:09

## 2020-08-16 RX ADMIN — PYRIDOSTIGMINE BROMIDE 60 MILLIGRAM(S): 60 SOLUTION ORAL at 17:46

## 2020-08-16 RX ADMIN — PIPERACILLIN AND TAZOBACTAM 25 GRAM(S): 4; .5 INJECTION, POWDER, LYOPHILIZED, FOR SOLUTION INTRAVENOUS at 17:45

## 2020-08-16 RX ADMIN — HYDROMORPHONE HYDROCHLORIDE 30 MILLILITER(S): 2 INJECTION INTRAMUSCULAR; INTRAVENOUS; SUBCUTANEOUS at 19:02

## 2020-08-16 RX ADMIN — PYRIDOSTIGMINE BROMIDE 60 MILLIGRAM(S): 60 SOLUTION ORAL at 05:12

## 2020-08-16 RX ADMIN — Medication 85 MILLIMOLE(S): at 11:14

## 2020-08-16 RX ADMIN — HYDROMORPHONE HYDROCHLORIDE 30 MILLILITER(S): 2 INJECTION INTRAMUSCULAR; INTRAVENOUS; SUBCUTANEOUS at 07:06

## 2020-08-16 RX ADMIN — AMLODIPINE BESYLATE 5 MILLIGRAM(S): 2.5 TABLET ORAL at 05:12

## 2020-08-16 NOTE — PROGRESS NOTE ADULT - SUBJECTIVE AND OBJECTIVE BOX
POD #6/POD#3    24hr events:  - Amlodipine 5mg started for hypertension    Overnight events:   - Hypertensive to 180s systolic, asymptomatic. Hydral 10mg IVP given.    SUBJECTIVE:  Patient feels well and has no acute complaints. States his pain is well-controlled. Denies nausea, vomiting. Would like to try food.    OBJECTIVE:  Vital Signs Last 24 Hrs  T(C): 37.1 (16 Aug 2020 00:52), Max: 37.1 (15 Aug 2020 04:37)  T(F): 98.8 (16 Aug 2020 00:52), Max: 98.8 (15 Aug 2020 04:37)  HR: 73 (16 Aug 2020 00:52) (66 - 80)  BP: 167/77 (16 Aug 2020 01:10) (164/83 - 183/84)  BP(mean): --  RR: 18 (16 Aug 2020 00:52) (18 - 18)  SpO2: 96% (16 Aug 2020 00:52) (95% - 97%)    Physical Examination:  GEN: NAD, resting quietly  PULM: symmetric chest rise bilaterally, no increased WOB  ABD: soft, nontender, mildly distended, incision CDI, minimal gas in ostomy bag, no stool  EXTR: no LE erythema, moving all extremities      LABS:                        12.2   8.55  )-----------( 294      ( 15 Aug 2020 07:03 )             38.3       08-15    133<L>  |  96  |  11  ----------------------------<  108<H>  4.3   |  27  |  1.31<H>    Ca    8.9      15 Aug 2020 07:03  Phos  1.4     08-15  Mg     2.2     08-15 POD #6/POD#3    24hr events:  - Amlodipine 10mg started for hypertension    Overnight events:   - Hypertensive to 180s systolic, asymptomatic. Hydral 10mg IVP given.    SUBJECTIVE:  Patient feels well and has no acute complaints. States his pain is well-controlled. Denies nausea, vomiting. Would like to try food.    OBJECTIVE:  Vital Signs Last 24 Hrs  T(C): 37.1 (16 Aug 2020 00:52), Max: 37.1 (15 Aug 2020 04:37)  T(F): 98.8 (16 Aug 2020 00:52), Max: 98.8 (15 Aug 2020 04:37)  HR: 73 (16 Aug 2020 00:52) (66 - 80)  BP: 167/77 (16 Aug 2020 01:10) (164/83 - 183/84)  BP(mean): --  RR: 18 (16 Aug 2020 00:52) (18 - 18)  SpO2: 96% (16 Aug 2020 00:52) (95% - 97%)    Physical Examination:  GEN: NAD, resting quietly  PULM: symmetric chest rise bilaterally, no increased WOB  ABD: soft, nontender, mildly distended, incision CDI, gas in ostomy bag, some stool in ostomy  EXTR: no LE erythema, moving all extremities      LABS:                        12.2   8.55  )-----------( 294      ( 15 Aug 2020 07:03 )             38.3       08-15    133<L>  |  96  |  11  ----------------------------<  108<H>  4.3   |  27  |  1.31<H>    Ca    8.9      15 Aug 2020 07:03  Phos  1.4     08-15  Mg     2.2     08-15

## 2020-08-16 NOTE — PROGRESS NOTE ADULT - ASSESSMENT
64yo M with myasthenia gravis POD6 from LAR for sigmoid diverticulitis complicated by anastomic leak now POD3 from RTOR for exlap, washout and Bryn's procedure. Patient is stable and recovering appropriately with some ostomy function noted today.    PLAN:    NEURO: Pain control as needed. Transition off PCA to oral meds. Continue pyridostigmine for myasthenia gravis.  CV: HDS  PULM: Satting well on RA  GI: Advance to CLD, continue IVF  : Voiding spontaneously.  ID: continue zosyn  HEME: DVT ppx  DISPO: floor. Per PT eval, can be discharged home with no needs when ready.    Red surgery, [id number] 64yo M with myasthenia gravis POD6 from LAR for sigmoid diverticulitis complicated by anastomic leak now POD3 from RTOR for exlap, washout and Bryn's procedure. Patient is stable and recovering appropriately with some ostomy function noted today.    PLAN:    NEURO: Pain control as needed. Continue w PCA meds today. Possible transition to po tomorrow. Continue pyridostigmine for myasthenia gravis.  CV: HDS  PULM: Satting well on RA  GI: Advanced CLD, continue IVF  : Voiding spontaneously.  ID: continue zosyn  HEME: DVT ppx  DISPO: floor. Per PT eval, can be discharged home with no needs when ready.    Red surgery, [id number]

## 2020-08-16 NOTE — PROGRESS NOTE ADULT - SUBJECTIVE AND OBJECTIVE BOX
Day __ of Anesthesia Pain Management Service    SUBJECTIVE: Patient is doing well with IV PCA    Pain Scale Score:	[X] Refer to charted pain scores    THERAPY:    [ ] IV PCA Morphine		[ ] 5 mg/mL	[ ] 1 mg/mL  [X] IV PCA Hydromorphone	[ ] 5 mg/mL	[X] 1 mg/mL  [ ] IV PCA Fentanyl		[ ] 50 micrograms/mL    Demand dose: 0.2 mg     Lockout: 6 minutes   Continuous Rate: 0 mg/hr  4 Hour Limit: 4 mg    MEDICATIONS  (STANDING):  amLODIPine   Tablet 10 milliGRAM(s) Oral daily  dextrose 5% + sodium chloride 0.45% with potassium chloride 20 mEq/L 1000 milliLiter(s) (75 mL/Hr) IV Continuous <Continuous>  enoxaparin Injectable 40 milliGRAM(s) SubCutaneous every 24 hours  HYDROmorphone PCA (1 mG/mL) 30 milliLiter(s) PCA Continuous PCA Continuous  piperacillin/tazobactam IVPB.. 3.375 Gram(s) IV Intermittent every 8 hours  pyridostigmine 60 milliGRAM(s) Oral four times a day  pyridostigmine  milliGRAM(s) Oral at bedtime    MEDICATIONS  (PRN):  HYDROmorphone PCA (1 mG/mL) Rescue Clinician Bolus 0.5 milliGRAM(s) IV Push every 15 minutes PRN for Pain Scale GREATER THAN 6  naloxone Injectable 0.1 milliGRAM(s) IV Push every 3 minutes PRN For ANY of the following changes in patient status:  A. RR LESS THAN 10 breaths per minute, B. Oxygen saturation LESS THAN 90%, C. Sedation score of 6  ondansetron Injectable 4 milliGRAM(s) IV Push every 6 hours PRN Nausea      OBJECTIVE:    Sedation Score:	[ X] Alert	[ ] Drowsy 	[ ] Arousable	[ ] Asleep	[ ] Unresponsive    Side Effects:	[X ] None	[ ] Nausea	[ ] Vomiting	[ ] Pruritus  		[ ] Other:    Vital Signs Last 24 Hrs  T(C): 37.2 (16 Aug 2020 16:46), Max: 37.3 (16 Aug 2020 04:53)  T(F): 99 (16 Aug 2020 16:46), Max: 99.1 (16 Aug 2020 04:53)  HR: 79 (16 Aug 2020 16:46) (73 - 82)  BP: 151/65 (16 Aug 2020 16:46) (151/65 - 183/84)  BP(mean): --  RR: 18 (16 Aug 2020 16:46) (18 - 18)  SpO2: 97% (16 Aug 2020 16:46) (95% - 97%)    ASSESSMENT/ PLAN    Therapy to  be:               [X] Continued   [ ] Discontinued   [ ] Changed to PRN Analgesics    Documentation and Verification of current medications:   [X] Done	[ ] Not done, not eligible    Comments:

## 2020-08-17 LAB
ANION GAP SERPL CALC-SCNC: 12 MMOL/L — SIGNIFICANT CHANGE UP (ref 5–17)
BUN SERPL-MCNC: 5 MG/DL — LOW (ref 7–23)
CALCIUM SERPL-MCNC: 8.9 MG/DL — SIGNIFICANT CHANGE UP (ref 8.4–10.5)
CHLORIDE SERPL-SCNC: 98 MMOL/L — SIGNIFICANT CHANGE UP (ref 96–108)
CO2 SERPL-SCNC: 24 MMOL/L — SIGNIFICANT CHANGE UP (ref 22–31)
CREAT SERPL-MCNC: 1.11 MG/DL — SIGNIFICANT CHANGE UP (ref 0.5–1.3)
GLUCOSE SERPL-MCNC: 110 MG/DL — HIGH (ref 70–99)
HCT VFR BLD CALC: 38.6 % — LOW (ref 39–50)
HGB BLD-MCNC: 12.2 G/DL — LOW (ref 13–17)
MAGNESIUM SERPL-MCNC: 1.8 MG/DL — SIGNIFICANT CHANGE UP (ref 1.6–2.6)
MCHC RBC-ENTMCNC: 27.6 PG — SIGNIFICANT CHANGE UP (ref 27–34)
MCHC RBC-ENTMCNC: 31.6 GM/DL — LOW (ref 32–36)
MCV RBC AUTO: 87.3 FL — SIGNIFICANT CHANGE UP (ref 80–100)
NRBC # BLD: 0 /100 WBCS — SIGNIFICANT CHANGE UP (ref 0–0)
PHOSPHATE SERPL-MCNC: 2.8 MG/DL — SIGNIFICANT CHANGE UP (ref 2.5–4.5)
PLATELET # BLD AUTO: 273 K/UL — SIGNIFICANT CHANGE UP (ref 150–400)
POTASSIUM SERPL-MCNC: 4.1 MMOL/L — SIGNIFICANT CHANGE UP (ref 3.5–5.3)
POTASSIUM SERPL-SCNC: 4.1 MMOL/L — SIGNIFICANT CHANGE UP (ref 3.5–5.3)
RBC # BLD: 4.42 M/UL — SIGNIFICANT CHANGE UP (ref 4.2–5.8)
RBC # FLD: 13.8 % — SIGNIFICANT CHANGE UP (ref 10.3–14.5)
SARS-COV-2 RNA SPEC QL NAA+PROBE: SIGNIFICANT CHANGE UP
SODIUM SERPL-SCNC: 134 MMOL/L — LOW (ref 135–145)
WBC # BLD: 8.76 K/UL — SIGNIFICANT CHANGE UP (ref 3.8–10.5)
WBC # FLD AUTO: 8.76 K/UL — SIGNIFICANT CHANGE UP (ref 3.8–10.5)

## 2020-08-17 RX ORDER — OXYCODONE HYDROCHLORIDE 5 MG/1
10 TABLET ORAL EVERY 4 HOURS
Refills: 0 | Status: DISCONTINUED | OUTPATIENT
Start: 2020-08-17 | End: 2020-08-19

## 2020-08-17 RX ORDER — MAGNESIUM SULFATE 500 MG/ML
2 VIAL (ML) INJECTION ONCE
Refills: 0 | Status: COMPLETED | OUTPATIENT
Start: 2020-08-17 | End: 2020-08-17

## 2020-08-17 RX ORDER — SODIUM,POTASSIUM PHOSPHATES 278-250MG
1 POWDER IN PACKET (EA) ORAL ONCE
Refills: 0 | Status: COMPLETED | OUTPATIENT
Start: 2020-08-17 | End: 2020-08-17

## 2020-08-17 RX ORDER — OXYCODONE HYDROCHLORIDE 5 MG/1
5 TABLET ORAL EVERY 4 HOURS
Refills: 0 | Status: DISCONTINUED | OUTPATIENT
Start: 2020-08-17 | End: 2020-08-19

## 2020-08-17 RX ORDER — ACETAMINOPHEN 500 MG
650 TABLET ORAL EVERY 6 HOURS
Refills: 0 | Status: DISCONTINUED | OUTPATIENT
Start: 2020-08-17 | End: 2020-08-19

## 2020-08-17 RX ADMIN — DEXTROSE MONOHYDRATE, SODIUM CHLORIDE, AND POTASSIUM CHLORIDE 30 MILLILITER(S): 50; .745; 4.5 INJECTION, SOLUTION INTRAVENOUS at 22:00

## 2020-08-17 RX ADMIN — AMLODIPINE BESYLATE 10 MILLIGRAM(S): 2.5 TABLET ORAL at 05:21

## 2020-08-17 RX ADMIN — DEXTROSE MONOHYDRATE, SODIUM CHLORIDE, AND POTASSIUM CHLORIDE 30 MILLILITER(S): 50; .745; 4.5 INJECTION, SOLUTION INTRAVENOUS at 14:12

## 2020-08-17 RX ADMIN — OXYCODONE HYDROCHLORIDE 5 MILLIGRAM(S): 5 TABLET ORAL at 10:45

## 2020-08-17 RX ADMIN — PYRIDOSTIGMINE BROMIDE 60 MILLIGRAM(S): 60 SOLUTION ORAL at 17:36

## 2020-08-17 RX ADMIN — PYRIDOSTIGMINE BROMIDE 180 MILLIGRAM(S): 60 SOLUTION ORAL at 21:52

## 2020-08-17 RX ADMIN — OXYCODONE HYDROCHLORIDE 5 MILLIGRAM(S): 5 TABLET ORAL at 10:07

## 2020-08-17 RX ADMIN — PYRIDOSTIGMINE BROMIDE 60 MILLIGRAM(S): 60 SOLUTION ORAL at 14:12

## 2020-08-17 RX ADMIN — Medication 50 GRAM(S): at 10:10

## 2020-08-17 RX ADMIN — PYRIDOSTIGMINE BROMIDE 60 MILLIGRAM(S): 60 SOLUTION ORAL at 07:49

## 2020-08-17 RX ADMIN — Medication 1 TABLET(S): at 10:09

## 2020-08-17 RX ADMIN — Medication 650 MILLIGRAM(S): at 18:01

## 2020-08-17 RX ADMIN — Medication 650 MILLIGRAM(S): at 12:00

## 2020-08-17 RX ADMIN — HYDROMORPHONE HYDROCHLORIDE 30 MILLILITER(S): 2 INJECTION INTRAMUSCULAR; INTRAVENOUS; SUBCUTANEOUS at 07:14

## 2020-08-17 RX ADMIN — PIPERACILLIN AND TAZOBACTAM 25 GRAM(S): 4; .5 INJECTION, POWDER, LYOPHILIZED, FOR SOLUTION INTRAVENOUS at 10:09

## 2020-08-17 RX ADMIN — Medication 650 MILLIGRAM(S): at 11:46

## 2020-08-17 RX ADMIN — ENOXAPARIN SODIUM 40 MILLIGRAM(S): 100 INJECTION SUBCUTANEOUS at 21:52

## 2020-08-17 RX ADMIN — PIPERACILLIN AND TAZOBACTAM 25 GRAM(S): 4; .5 INJECTION, POWDER, LYOPHILIZED, FOR SOLUTION INTRAVENOUS at 02:26

## 2020-08-17 RX ADMIN — Medication 650 MILLIGRAM(S): at 17:35

## 2020-08-17 NOTE — CHART NOTE - NSCHARTNOTEFT_GEN_A_CORE
Nutrition Follow Up Note  Patient seen for: nutrition follow-up    Chart reviewed, events noted. This is  64yo M with myasthenia gravis POD7 from LAR for sigmoid diverticulitis complicated by anastomic leak now POD4 from RTOR for exlap, washout and Bryn's procedure.    Source: patient, medical record     Diet : Low Fiber diet (since )    Pt initially advanced to low fiber diet (-), developed abdominal pain, s/p Gomez's procedure 2/2 large bowel anastomotic leak on . Diet advanced to clear liquids on . Patient reported good tolerance to clear liquid diet, diet now advanced to low fiber diet. No nausea, emesis noted. Ostomy output: 110ml (). Pt previously education on low fiber during initial RD assessment, amendable to additional diet education. Reviewed colostomy nutrition therapy/food list handout Discussed eating low-fiber foods, chewing foods well, small frequent meals high in protein & energy. Reviewed foods that may cause odors &/or gas, discussed foods that bulk stool and thin stool, and importance of adequate hydration. Patient with no nutrition-related questions at this time. Made aware RD remains available as needed.      PO intake : <50%     Source for PO intake: pt report      Enteral /Parenteral Nutrition: N/A      Daily Weight in k.5 ()      Pertinent Medications: MEDICATIONS  (STANDING):  acetaminophen   Tablet .. 650 milliGRAM(s) Oral every 6 hours  amLODIPine   Tablet 10 milliGRAM(s) Oral daily  dextrose 5% + sodium chloride 0.45% with potassium chloride 20 mEq/L 1000 milliLiter(s) (30 mL/Hr) IV Continuous <Continuous>  enoxaparin Injectable 40 milliGRAM(s) SubCutaneous every 24 hours  pyridostigmine 60 milliGRAM(s) Oral four times a day  pyridostigmine  milliGRAM(s) Oral at bedtime    MEDICATIONS  (PRN):  oxyCODONE    IR 5 milliGRAM(s) Oral every 4 hours PRN Moderate Pain (4 - 6)  oxyCODONE    IR 10 milliGRAM(s) Oral every 4 hours PRN Severe Pain (7 - 10)    Pertinent Labs:  @ 08:56: Na 134<L>, BUN 5<L>, Cr 1.11, <H>, K+ 4.1, Phos 2.8, Mg 1.8, Alk Phos --, ALT/SGPT --, AST/SGOT --, HbA1c --    Finger Sticks: reviewed       Skin per nursing documentation: free of pressure injuries per nursing flow sheets, midline abdomen surgical incision   Edema: none     Estimated Needs:   [ x] no change since previous assessment  [ ] recalculated:     Previous Nutrition Diagnosis: Food & Nutrition Related Knowledge Deficit.   Nutrition Diagnosis is: on going, being addressed with diet education     New Nutrition Diagnosis: Inadequate protein-energy intake   Related to: inability to consume adequate nutrition in the setting of altered GI function    As evidenced by: pt intermittently NPO/clear liquid diet during hospitalization      Interventions:   Recommend  1) Continue current diet as tolerated.   2) Recommend addition of Ensure Enlive BID to supplement PO intake   3) Diet education provided, reinforce as needed.     Monitoring and Evaluation:     Continue to monitor Nutritional intake, Tolerance to diet prescription, weights, labs, skin integrity    RD remains available upon request and will follow up per protocol  Damaris Mcguire RD, CDN, Pager # [phone]

## 2020-08-17 NOTE — PROVIDER CONTACT NOTE (OTHER) - BACKGROUND
Pt status post partial colectomy, ex lap LAR, and Bryn procedure.
Pt status post partial colectomy, ex lap, and Bryn procedure
s/p lap assisted LAR on 8/10
Patient diagnosed w/ PMHx of HTN, was in 7/10 pain as stated by patient at time of Vital signs.

## 2020-08-17 NOTE — PROGRESS NOTE ADULT - SUBJECTIVE AND OBJECTIVE BOX
Surgery Progress Note    SUBJECTIVE: Overnight, pt complained of an episode of chest pain after taking his medication on an empty stomach which subsided shortly thereafter. EKG, repeat vitals, and physical exam were normal. Pt seen and examined at bedside in the morning. Patient endorses being comfortable with minimal pain. Denies nausea, vomiting. +Flatus/+BM in ostomy. Tolerating clear liquid diet.    Vital Signs Last 24 Hrs  T(C): 37.5 (16 Aug 2020 21:20), Max: 37.5 (16 Aug 2020 21:20)  T(F): 99.5 (16 Aug 2020 21:20), Max: 99.5 (16 Aug 2020 21:20)  HR: 78 (16 Aug 2020 21:20) (73 - 82)  BP: 172/80 (16 Aug 2020 23:36) (151/65 - 183/84)  BP(mean): --  RR: 18 (16 Aug 2020 21:20) (18 - 18)  SpO2: 97% (16 Aug 2020 21:20) (95% - 97%)    Physical Exam:  General Appearance: Appears well, NAD  Respiratory: No labored breathing  CV: Pulse regularly present  Abdomen: Soft, nontender, mildly distended, no guarding or rebound tenderness. Surgical dressings clean, dry, intact. Ostomy in place with some gas and stool present.  Extremities: Warm, well-perfused, moving spontaneously.    LABS:                        12.0   9.37  )-----------( 321      ( 16 Aug 2020 07:11 )             37.8     08-16    134<L>  |  98  |  8   ----------------------------<  93  4.0   |  25  |  1.23    Ca    8.9      16 Aug 2020 07:11  Phos  1.9     08-16  Mg     1.9     08-16            INs and OUTs:    08-15-20 @ 07:01  -  08-16-20 @ 07:00  --------------------------------------------------------  IN: 1800 mL / OUT: 2305 mL / NET: -505 mL    08-16-20 @ 07:01  -  08-17-20 @ 00:44  --------------------------------------------------------  IN: 2500 mL / OUT: 1750 mL / NET: 750 mL Surgery Progress Note    SUBJECTIVE: Overnight, pt complained of an episode of chest pain after taking his medication on an empty stomach which subsided shortly thereafter. EKG, repeat vitals, and physical exam were normal. Pt seen and examined at bedside in the morning. Patient endorses being comfortable with minimal pain. Denies nausea, vomiting. +Flatus/+BM in ostomy. Tolerating clear liquid diet.    Vital Signs Last 24 Hrs  T(C): 37.5 (16 Aug 2020 21:20), Max: 37.5 (16 Aug 2020 21:20)  T(F): 99.5 (16 Aug 2020 21:20), Max: 99.5 (16 Aug 2020 21:20)  HR: 78 (16 Aug 2020 21:20) (73 - 82)  BP: 172/80 (16 Aug 2020 23:36) (151/65 - 183/84)  BP(mean): --  RR: 18 (16 Aug 2020 21:20) (18 - 18)  SpO2: 97% (16 Aug 2020 21:20) (95% - 97%)    Physical Exam:  General Appearance: Appears well, NAD  Respiratory: No labored breathing  CV: Pulse regularly present  Abdomen: Soft, nontender, mildly distended, no guarding or rebound tenderness. Surgical dressings clean, dry, intact. Ostomy in place with some gas and stool present.  Analy removed from midline incision today  Extremities: Warm, well-perfused, moving spontaneously.    LABS:                        12.0   9.37  )-----------( 321      ( 16 Aug 2020 07:11 )             37.8     08-16    134<L>  |  98  |  8   ----------------------------<  93  4.0   |  25  |  1.23    Ca    8.9      16 Aug 2020 07:11  Phos  1.9     08-16  Mg     1.9     08-16            INs and OUTs:    08-15-20 @ 07:01  -  08-16-20 @ 07:00  --------------------------------------------------------  IN: 1800 mL / OUT: 2305 mL / NET: -505 mL    08-16-20 @ 07:01  -  08-17-20 @ 00:44  --------------------------------------------------------  IN: 2500 mL / OUT: 1750 mL / NET: 750 mL Surgery Progress Note    SUBJECTIVE: Overnight, pt complained of an episode of chest pain after taking his medication on an empty stomach which subsided shortly thereafter. EKG, repeat vitals, and physical exam were normal. Pt seen and examined at bedside in the morning. Patient endorses being comfortable with minimal pain. Denies nausea, vomiting. +Flatus/+BM in ostomy. Tolerating clear liquid diet.    Vital Signs Last 24 Hrs  T(C): 37.5 (16 Aug 2020 21:20), Max: 37.5 (16 Aug 2020 21:20)  T(F): 99.5 (16 Aug 2020 21:20), Max: 99.5 (16 Aug 2020 21:20)  HR: 78 (16 Aug 2020 21:20) (73 - 82)  BP: 172/80 (16 Aug 2020 23:36) (151/65 - 183/84)  BP(mean): --  RR: 18 (16 Aug 2020 21:20) (18 - 18)  SpO2: 97% (16 Aug 2020 21:20) (95% - 97%)    Physical Exam:  General Appearance: Appears well, NAD  Respiratory: No labored breathing  CV: Pulse regularly present  Abdomen: Soft, nontender, mildly distended, no guarding or rebound tenderness. Surgical dressings clean, dry, intact. Ostomy in place with some gas and stool present.  Analy removed from midline incision today.  Extremities: Warm, well-perfused, moving spontaneously.    LABS:                        12.0   9.37  )-----------( 321      ( 16 Aug 2020 07:11 )             37.8     08-16    134<L>  |  98  |  8   ----------------------------<  93  4.0   |  25  |  1.23    Ca    8.9      16 Aug 2020 07:11  Phos  1.9     08-16  Mg     1.9     08-16            INs and OUTs:    08-15-20 @ 07:01  -  08-16-20 @ 07:00  --------------------------------------------------------  IN: 1800 mL / OUT: 2305 mL / NET: -505 mL    08-16-20 @ 07:01  -  08-17-20 @ 00:44  --------------------------------------------------------  IN: 2500 mL / OUT: 1750 mL / NET: 750 mL

## 2020-08-17 NOTE — PROVIDER CONTACT NOTE (OTHER) - RECOMMENDATIONS
MD aware of patients hypertension
MD matthews.
Md notified
Md notified.
Relieve pain, continue to monitor. FIONA Vigil made aware.

## 2020-08-17 NOTE — CHART NOTE - NSCHARTNOTEFT_GEN_A_CORE
Red team was notified by nurse that pt was experiencing "chest pain." Pt was seen and examined at bedside. Pt endorsed that he was experiencing a non-radiating chest "tightness" over the center of his chest that began after taking his pyridostigmine and that he frequently experiences the same pain when he takes pyridostigmine on an empty stomach, and in this case, he only had liquids to consume with his medication. Pt also endorsed that at the time of the exam, the chest pain had largely subsided. Pt denied any other associated symptoms.     Physical exam:  General: Seen sitting upright in bed in no acute distress, awake, alert, and oriented x3, talking calmly.  Chest: No tenderness to palpation. No labored breathing observed.  Abd: Soft, nontender, mildly distended. Surgical dressings clean, dry, intact. Ostomy in place. No guarding or rebound tenderness.  Extremities: warm, well perfused, moving spontaneously    Vital Signs Last 24 Hrs  T(C): 37.5 (16 Aug 2020 21:20), Max: 37.5 (16 Aug 2020 21:20)  T(F): 99.5 (16 Aug 2020 21:20), Max: 99.5 (16 Aug 2020 21:20)  HR: 78 (16 Aug 2020 21:20) (73 - 82)  BP: 172/80 (16 Aug 2020 23:36) (151/65 - 183/84)  BP(mean): --  RR: 18 (16 Aug 2020 21:20) (18 - 18)  SpO2: 97% (16 Aug 2020 21:20) (95% - 97%)    EKG: Normal sinus rhythm, comparable to previous EKG in chart.    64yo M with myasthenia gravis POD6 from LAR for sigmoid diverticulitis complicated by anastomic leak now POD3 from RTOR for exlap, washout and Bryn's procedure, complaining of an episode of chest tightness after taking medication on an empty stomach similar to past experiences of taking the same medication on an empty stomach. Repeat vitals were stable, EKG showed a normal sinus rhythm similar to his previous EKG, and physical exam was benign.    Plan: continue to monitor vitals, any future episodes of chest pain, and encourage greater po intake w/ his pyridostigmine (as tolerated).    Pt seen and examined with Dr. Thomas ([id number])

## 2020-08-17 NOTE — PROGRESS NOTE ADULT - ASSESSMENT
66yo M with myasthenia gravis POD7 from LAR for sigmoid diverticulitis complicated by anastomic leak now POD4 from RTOR for exlap, washout and Bryn's procedure. Patient is stable and recovering appropriately with increasing ostomy function noted today.    PLAN:    Pain: Transition from PCA to po meds.   Continue pyridostigmine for myasthenia gravis.  GI: CLD. Possibly advance to LRD.  ID: continue zosyn  HEME: DVT ppx  DISPO: floor. Per PT eval, can be discharged home with no needs when ready.    Red surgery, [id number] 64yo M with myasthenia gravis POD7 from LAR for sigmoid diverticulitis complicated by anastomic leak now POD4 from RTOR for exlap, washout and Bryn's procedure. Patient is stable and recovering appropriately with increasing ostomy function noted today.    PLAN:    Pain: Transition from PCA to po meds.   Continue pyridostigmine for myasthenia gravis.  GI: CLD. Possibly advance to LRD later in day  ID: continue zosyn  HEME: DVT ppx  DISPO: floor. Per PT eval, can be discharged home with no needs when ready.    Red surgery, [id number]

## 2020-08-17 NOTE — PROVIDER CONTACT NOTE (OTHER) - ASSESSMENT
/79 HR 66, pt denies chest pain/
Pt /78. Bp has consistently been in the 170's. Pt has been asymptomatic. Pt denies chest pain or dizziness. All other VSS.
Pt /84. Pt BP has consistently been elevated. All other VSS. Pt asymptomatic and denies chest pain and dizziness.
temp & BP elevated. Oral temp 100.6 and /78. HR 83. All other VSS. Pt denies chest pain, headache, &  change in vision. However, pt endorses abdominal pain rated 9/10. Abd firm +flatus/+BMs overnight. 5mg oxycodone given for pain. Tylenol last given at midnight. Urine output WDL.
Patient's BP elevated to 168/78. No c/o chest pain, dizziness, headache or vision changes. All other VSS. Generalized abdominal pain at time of assessment.

## 2020-08-18 LAB
ANION GAP SERPL CALC-SCNC: 12 MMOL/L — SIGNIFICANT CHANGE UP (ref 5–17)
BUN SERPL-MCNC: 8 MG/DL — SIGNIFICANT CHANGE UP (ref 7–23)
CALCIUM SERPL-MCNC: 9.1 MG/DL — SIGNIFICANT CHANGE UP (ref 8.4–10.5)
CHLORIDE SERPL-SCNC: 101 MMOL/L — SIGNIFICANT CHANGE UP (ref 96–108)
CO2 SERPL-SCNC: 24 MMOL/L — SIGNIFICANT CHANGE UP (ref 22–31)
CREAT SERPL-MCNC: 1.16 MG/DL — SIGNIFICANT CHANGE UP (ref 0.5–1.3)
CULTURE RESULTS: SIGNIFICANT CHANGE UP
CULTURE RESULTS: SIGNIFICANT CHANGE UP
GLUCOSE SERPL-MCNC: 98 MG/DL — SIGNIFICANT CHANGE UP (ref 70–99)
HCT VFR BLD CALC: 36.6 % — LOW (ref 39–50)
HGB BLD-MCNC: 11.8 G/DL — LOW (ref 13–17)
MAGNESIUM SERPL-MCNC: 2 MG/DL — SIGNIFICANT CHANGE UP (ref 1.6–2.6)
MCHC RBC-ENTMCNC: 27.4 PG — SIGNIFICANT CHANGE UP (ref 27–34)
MCHC RBC-ENTMCNC: 32.2 GM/DL — SIGNIFICANT CHANGE UP (ref 32–36)
MCV RBC AUTO: 84.9 FL — SIGNIFICANT CHANGE UP (ref 80–100)
NRBC # BLD: 0 /100 WBCS — SIGNIFICANT CHANGE UP (ref 0–0)
PHOSPHATE SERPL-MCNC: 2.8 MG/DL — SIGNIFICANT CHANGE UP (ref 2.5–4.5)
PLATELET # BLD AUTO: 442 K/UL — HIGH (ref 150–400)
POTASSIUM SERPL-MCNC: 4.1 MMOL/L — SIGNIFICANT CHANGE UP (ref 3.5–5.3)
POTASSIUM SERPL-SCNC: 4.1 MMOL/L — SIGNIFICANT CHANGE UP (ref 3.5–5.3)
RBC # BLD: 4.31 M/UL — SIGNIFICANT CHANGE UP (ref 4.2–5.8)
RBC # FLD: 13.6 % — SIGNIFICANT CHANGE UP (ref 10.3–14.5)
SODIUM SERPL-SCNC: 137 MMOL/L — SIGNIFICANT CHANGE UP (ref 135–145)
SPECIMEN SOURCE: SIGNIFICANT CHANGE UP
SPECIMEN SOURCE: SIGNIFICANT CHANGE UP
WBC # BLD: 10.79 K/UL — HIGH (ref 3.8–10.5)
WBC # FLD AUTO: 10.79 K/UL — HIGH (ref 3.8–10.5)

## 2020-08-18 PROCEDURE — 74177 CT ABD & PELVIS W/CONTRAST: CPT | Mod: 26

## 2020-08-18 RX ORDER — ONDANSETRON 8 MG/1
4 TABLET, FILM COATED ORAL ONCE
Refills: 0 | Status: COMPLETED | OUTPATIENT
Start: 2020-08-18 | End: 2020-08-18

## 2020-08-18 RX ORDER — AMLODIPINE BESYLATE 2.5 MG/1
1 TABLET ORAL
Qty: 0 | Refills: 0 | DISCHARGE
Start: 2020-08-18

## 2020-08-18 RX ORDER — PIPERACILLIN AND TAZOBACTAM 4; .5 G/20ML; G/20ML
3.38 INJECTION, POWDER, LYOPHILIZED, FOR SOLUTION INTRAVENOUS ONCE
Refills: 0 | Status: COMPLETED | OUTPATIENT
Start: 2020-08-18 | End: 2020-08-18

## 2020-08-18 RX ORDER — SODIUM,POTASSIUM PHOSPHATES 278-250MG
2 POWDER IN PACKET (EA) ORAL ONCE
Refills: 0 | Status: COMPLETED | OUTPATIENT
Start: 2020-08-18 | End: 2020-08-18

## 2020-08-18 RX ORDER — PIPERACILLIN AND TAZOBACTAM 4; .5 G/20ML; G/20ML
3.38 INJECTION, POWDER, LYOPHILIZED, FOR SOLUTION INTRAVENOUS EVERY 8 HOURS
Refills: 0 | Status: DISCONTINUED | OUTPATIENT
Start: 2020-08-18 | End: 2020-08-19

## 2020-08-18 RX ORDER — AMLODIPINE BESYLATE 2.5 MG/1
1 TABLET ORAL
Qty: 20 | Refills: 0
Start: 2020-08-18

## 2020-08-18 RX ADMIN — ENOXAPARIN SODIUM 40 MILLIGRAM(S): 100 INJECTION SUBCUTANEOUS at 22:04

## 2020-08-18 RX ADMIN — Medication 650 MILLIGRAM(S): at 14:11

## 2020-08-18 RX ADMIN — Medication 650 MILLIGRAM(S): at 13:41

## 2020-08-18 RX ADMIN — PYRIDOSTIGMINE BROMIDE 180 MILLIGRAM(S): 60 SOLUTION ORAL at 22:04

## 2020-08-18 RX ADMIN — OXYCODONE HYDROCHLORIDE 5 MILLIGRAM(S): 5 TABLET ORAL at 12:48

## 2020-08-18 RX ADMIN — ONDANSETRON 4 MILLIGRAM(S): 8 TABLET, FILM COATED ORAL at 09:11

## 2020-08-18 RX ADMIN — PIPERACILLIN AND TAZOBACTAM 25 GRAM(S): 4; .5 INJECTION, POWDER, LYOPHILIZED, FOR SOLUTION INTRAVENOUS at 22:03

## 2020-08-18 RX ADMIN — Medication 650 MILLIGRAM(S): at 02:48

## 2020-08-18 RX ADMIN — AMLODIPINE BESYLATE 10 MILLIGRAM(S): 2.5 TABLET ORAL at 05:33

## 2020-08-18 RX ADMIN — Medication 650 MILLIGRAM(S): at 22:35

## 2020-08-18 RX ADMIN — PIPERACILLIN AND TAZOBACTAM 200 GRAM(S): 4; .5 INJECTION, POWDER, LYOPHILIZED, FOR SOLUTION INTRAVENOUS at 15:29

## 2020-08-18 RX ADMIN — Medication 2 PACKET(S): at 11:31

## 2020-08-18 RX ADMIN — Medication 650 MILLIGRAM(S): at 02:18

## 2020-08-18 RX ADMIN — PYRIDOSTIGMINE BROMIDE 60 MILLIGRAM(S): 60 SOLUTION ORAL at 17:52

## 2020-08-18 RX ADMIN — Medication 650 MILLIGRAM(S): at 08:12

## 2020-08-18 RX ADMIN — OXYCODONE HYDROCHLORIDE 5 MILLIGRAM(S): 5 TABLET ORAL at 22:03

## 2020-08-18 RX ADMIN — OXYCODONE HYDROCHLORIDE 5 MILLIGRAM(S): 5 TABLET ORAL at 22:38

## 2020-08-18 RX ADMIN — Medication 650 MILLIGRAM(S): at 08:45

## 2020-08-18 RX ADMIN — PYRIDOSTIGMINE BROMIDE 60 MILLIGRAM(S): 60 SOLUTION ORAL at 12:48

## 2020-08-18 RX ADMIN — OXYCODONE HYDROCHLORIDE 5 MILLIGRAM(S): 5 TABLET ORAL at 13:20

## 2020-08-18 RX ADMIN — PYRIDOSTIGMINE BROMIDE 60 MILLIGRAM(S): 60 SOLUTION ORAL at 08:28

## 2020-08-18 RX ADMIN — Medication 650 MILLIGRAM(S): at 22:05

## 2020-08-18 NOTE — PROGRESS NOTE ADULT - ASSESSMENT
64yo M with myasthenia gravis POD8 from LAR for sigmoid diverticulitis complicated by anastomic leak now POD5 from RTOR for exlap, washout and Bryn's procedure. Patient is stable and recovering appropriately with increasing ostomy function noted today.    PLAN:    LRD as tolerated  Continue pyridostigmine for myasthenia gravis.  D/C planning  Will set up VNS for ostomy care and wound packing    Marlin Workman PA-C [id number] 66yo M with myasthenia gravis POD8 from LAR for sigmoid diverticulitis complicated by anastomic leak now POD5 from RTOR for exlap, washout and Bryn's procedure.     PLAN:  CT abd/pelvis w/ oral and IV contrast r/o collection  LRD as tolerated  Continue pyridostigmine for myasthenia gravis.  Will set up VNS for ostomy care and wound packing    Marlin Workman PA-C [id number]

## 2020-08-18 NOTE — PROGRESS NOTE ADULT - SUBJECTIVE AND OBJECTIVE BOX
COLORECTAL SURGERY DAILY PROGRESS NOTE:       SUBJECTIVE/ROS: Patient feels well- tolerating low fiber diet- Denies nausea, vomiting, chest pain, shortness of breath       MEDICATIONS  (STANDING):  acetaminophen   Tablet .. 650 milliGRAM(s) Oral every 6 hours  amLODIPine   Tablet 10 milliGRAM(s) Oral daily  enoxaparin Injectable 40 milliGRAM(s) SubCutaneous every 24 hours  pyridostigmine 60 milliGRAM(s) Oral four times a day  pyridostigmine  milliGRAM(s) Oral at bedtime    MEDICATIONS  (PRN):  oxyCODONE    IR 5 milliGRAM(s) Oral every 4 hours PRN Moderate Pain (4 - 6)  oxyCODONE    IR 10 milliGRAM(s) Oral every 4 hours PRN Severe Pain (7 - 10)      OBJECTIVE:    Vital Signs Last 24 Hrs  T(C): 37.5 (18 Aug 2020 05:28), Max: 37.7 (18 Aug 2020 01:05)  T(F): 99.5 (18 Aug 2020 05:28), Max: 99.8 (18 Aug 2020 01:05)  HR: 75 (18 Aug 2020 06:34) (73 - 83)  BP: 165/82 (18 Aug 2020 06:34) (151/76 - 181/92)  BP(mean): --  RR: 18 (18 Aug 2020 05:28) (17 - 18)  SpO2: 98% (18 Aug 2020 05:28) (97% - 98%)        I&O's Detail    17 Aug 2020 07:01  -  18 Aug 2020 07:00  --------------------------------------------------------  IN:    dextrose 5% + sodium chloride 0.45% with potassium chloride 20 mEq/L: 675 mL    Oral Fluid: 480 mL    Solution: 100 mL    Solution: 50 mL  Total IN: 1305 mL    OUT:    Colostomy: 550 mL    Voided: 3050 mL  Total OUT: 3600 mL    Total NET: -2295 mL          Daily     Daily     LABS:                        11.8   10.79 )-----------( 442      ( 18 Aug 2020 06:53 )             36.6     08-18    137  |  101  |  8   ----------------------------<  98  4.1   |  24  |  1.16    Ca    9.1      18 Aug 2020 06:53  Phos  2.8     08-18  Mg     2.0     08-18              Physical Exam:  General Appearance: Appears well, NAD  Respiratory: No labored breathing  CV: Pulse regularly present  Abdomen: Soft, nontender, mildly distended, no guarding or rebound tenderness. Surgical dressings clean, dry, intact. Ostomy in place with some gas and stool present. several staples removed at top portion of wound with some drainage, packed with gauze  Extremities: Warm, well-perfused, moving spontaneously. COLORECTAL SURGERY DAILY PROGRESS NOTE:       SUBJECTIVE/ROS: Patient feels well- tolerated low fiber yesterday- mild nausea with breakfast this morning- Denies vomiting, chest pain, shortness of breath       MEDICATIONS  (STANDING):  acetaminophen   Tablet .. 650 milliGRAM(s) Oral every 6 hours  amLODIPine   Tablet 10 milliGRAM(s) Oral daily  enoxaparin Injectable 40 milliGRAM(s) SubCutaneous every 24 hours  pyridostigmine 60 milliGRAM(s) Oral four times a day  pyridostigmine  milliGRAM(s) Oral at bedtime    MEDICATIONS  (PRN):  oxyCODONE    IR 5 milliGRAM(s) Oral every 4 hours PRN Moderate Pain (4 - 6)  oxyCODONE    IR 10 milliGRAM(s) Oral every 4 hours PRN Severe Pain (7 - 10)      OBJECTIVE:    Vital Signs Last 24 Hrs  T(C): 37.5 (18 Aug 2020 05:28), Max: 37.7 (18 Aug 2020 01:05)  T(F): 99.5 (18 Aug 2020 05:28), Max: 99.8 (18 Aug 2020 01:05)  HR: 75 (18 Aug 2020 06:34) (73 - 83)  BP: 165/82 (18 Aug 2020 06:34) (151/76 - 181/92)  BP(mean): --  RR: 18 (18 Aug 2020 05:28) (17 - 18)  SpO2: 98% (18 Aug 2020 05:28) (97% - 98%)        I&O's Detail    17 Aug 2020 07:01  -  18 Aug 2020 07:00  --------------------------------------------------------  IN:    dextrose 5% + sodium chloride 0.45% with potassium chloride 20 mEq/L: 675 mL    Oral Fluid: 480 mL    Solution: 100 mL    Solution: 50 mL  Total IN: 1305 mL    OUT:    Colostomy: 550 mL    Voided: 3050 mL  Total OUT: 3600 mL    Total NET: -2295 mL          Daily     Daily     LABS:                        11.8   10.79 )-----------( 442      ( 18 Aug 2020 06:53 )             36.6     08-18    137  |  101  |  8   ----------------------------<  98  4.1   |  24  |  1.16    Ca    9.1      18 Aug 2020 06:53  Phos  2.8     08-18  Mg     2.0     08-18              Physical Exam:  General Appearance: Appears well, NAD  Respiratory: No labored breathing  CV: Pulse regularly present  Abdomen: Soft, nontender, mildly distended, no guarding or rebound tenderness. Surgical dressings clean, dry, intact. Ostomy in place with some gas and stool present. several staples removed at top portion of wound with some drainage, packed with gauze  Extremities: Warm, well-perfused, moving spontaneously.

## 2020-08-19 ENCOUNTER — TRANSCRIPTION ENCOUNTER (OUTPATIENT)
Age: 65
End: 2020-08-19

## 2020-08-19 VITALS
HEART RATE: 80 BPM | DIASTOLIC BLOOD PRESSURE: 72 MMHG | TEMPERATURE: 98 F | SYSTOLIC BLOOD PRESSURE: 133 MMHG | RESPIRATION RATE: 18 BRPM | OXYGEN SATURATION: 97 %

## 2020-08-19 LAB
ANION GAP SERPL CALC-SCNC: 10 MMOL/L — SIGNIFICANT CHANGE UP (ref 5–17)
BASOPHILS # BLD AUTO: 0.05 K/UL — SIGNIFICANT CHANGE UP (ref 0–0.2)
BASOPHILS NFR BLD AUTO: 0.4 % — SIGNIFICANT CHANGE UP (ref 0–2)
BUN SERPL-MCNC: 10 MG/DL — SIGNIFICANT CHANGE UP (ref 7–23)
CALCIUM SERPL-MCNC: 9.1 MG/DL — SIGNIFICANT CHANGE UP (ref 8.4–10.5)
CHLORIDE SERPL-SCNC: 97 MMOL/L — SIGNIFICANT CHANGE UP (ref 96–108)
CO2 SERPL-SCNC: 27 MMOL/L — SIGNIFICANT CHANGE UP (ref 22–31)
CREAT SERPL-MCNC: 1.29 MG/DL — SIGNIFICANT CHANGE UP (ref 0.5–1.3)
EOSINOPHIL # BLD AUTO: 0.33 K/UL — SIGNIFICANT CHANGE UP (ref 0–0.5)
EOSINOPHIL NFR BLD AUTO: 2.5 % — SIGNIFICANT CHANGE UP (ref 0–6)
GLUCOSE SERPL-MCNC: 89 MG/DL — SIGNIFICANT CHANGE UP (ref 70–99)
HCT VFR BLD CALC: 36 % — LOW (ref 39–50)
HGB BLD-MCNC: 11.6 G/DL — LOW (ref 13–17)
IMM GRANULOCYTES NFR BLD AUTO: 4.4 % — HIGH (ref 0–1.5)
LYMPHOCYTES # BLD AUTO: 2.67 K/UL — SIGNIFICANT CHANGE UP (ref 1–3.3)
LYMPHOCYTES # BLD AUTO: 20.5 % — SIGNIFICANT CHANGE UP (ref 13–44)
MAGNESIUM SERPL-MCNC: 2 MG/DL — SIGNIFICANT CHANGE UP (ref 1.6–2.6)
MCHC RBC-ENTMCNC: 27.8 PG — SIGNIFICANT CHANGE UP (ref 27–34)
MCHC RBC-ENTMCNC: 32.2 GM/DL — SIGNIFICANT CHANGE UP (ref 32–36)
MCV RBC AUTO: 86.3 FL — SIGNIFICANT CHANGE UP (ref 80–100)
MONOCYTES # BLD AUTO: 1.81 K/UL — HIGH (ref 0–0.9)
MONOCYTES NFR BLD AUTO: 13.9 % — SIGNIFICANT CHANGE UP (ref 2–14)
NEUTROPHILS # BLD AUTO: 7.61 K/UL — HIGH (ref 1.8–7.4)
NEUTROPHILS NFR BLD AUTO: 58.3 % — SIGNIFICANT CHANGE UP (ref 43–77)
NRBC # BLD: 0 /100 WBCS — SIGNIFICANT CHANGE UP (ref 0–0)
PHOSPHATE SERPL-MCNC: 3.3 MG/DL — SIGNIFICANT CHANGE UP (ref 2.5–4.5)
PLATELET # BLD AUTO: 485 K/UL — HIGH (ref 150–400)
POTASSIUM SERPL-MCNC: 4.2 MMOL/L — SIGNIFICANT CHANGE UP (ref 3.5–5.3)
POTASSIUM SERPL-SCNC: 4.2 MMOL/L — SIGNIFICANT CHANGE UP (ref 3.5–5.3)
RBC # BLD: 4.17 M/UL — LOW (ref 4.2–5.8)
RBC # FLD: 13.5 % — SIGNIFICANT CHANGE UP (ref 10.3–14.5)
SODIUM SERPL-SCNC: 134 MMOL/L — LOW (ref 135–145)
WBC # BLD: 13.05 K/UL — HIGH (ref 3.8–10.5)
WBC # FLD AUTO: 13.05 K/UL — HIGH (ref 3.8–10.5)

## 2020-08-19 PROCEDURE — 93005 ELECTROCARDIOGRAM TRACING: CPT

## 2020-08-19 PROCEDURE — S2900: CPT

## 2020-08-19 PROCEDURE — C1889: CPT

## 2020-08-19 PROCEDURE — 97161 PT EVAL LOW COMPLEX 20 MIN: CPT

## 2020-08-19 PROCEDURE — 74177 CT ABD & PELVIS W/CONTRAST: CPT

## 2020-08-19 PROCEDURE — 87040 BLOOD CULTURE FOR BACTERIA: CPT

## 2020-08-19 PROCEDURE — 71045 X-RAY EXAM CHEST 1 VIEW: CPT

## 2020-08-19 PROCEDURE — 86900 BLOOD TYPING SEROLOGIC ABO: CPT

## 2020-08-19 PROCEDURE — C9399: CPT

## 2020-08-19 PROCEDURE — 88307 TISSUE EXAM BY PATHOLOGIST: CPT

## 2020-08-19 PROCEDURE — 87086 URINE CULTURE/COLONY COUNT: CPT

## 2020-08-19 PROCEDURE — 81001 URINALYSIS AUTO W/SCOPE: CPT

## 2020-08-19 PROCEDURE — 86901 BLOOD TYPING SEROLOGIC RH(D): CPT

## 2020-08-19 PROCEDURE — 83735 ASSAY OF MAGNESIUM: CPT

## 2020-08-19 PROCEDURE — U0003: CPT

## 2020-08-19 PROCEDURE — 84100 ASSAY OF PHOSPHORUS: CPT

## 2020-08-19 PROCEDURE — 82962 GLUCOSE BLOOD TEST: CPT

## 2020-08-19 PROCEDURE — 80048 BASIC METABOLIC PNL TOTAL CA: CPT

## 2020-08-19 PROCEDURE — 85027 COMPLETE CBC AUTOMATED: CPT

## 2020-08-19 RX ADMIN — PIPERACILLIN AND TAZOBACTAM 25 GRAM(S): 4; .5 INJECTION, POWDER, LYOPHILIZED, FOR SOLUTION INTRAVENOUS at 14:12

## 2020-08-19 RX ADMIN — AMLODIPINE BESYLATE 10 MILLIGRAM(S): 2.5 TABLET ORAL at 05:18

## 2020-08-19 RX ADMIN — Medication 650 MILLIGRAM(S): at 18:08

## 2020-08-19 RX ADMIN — PIPERACILLIN AND TAZOBACTAM 25 GRAM(S): 4; .5 INJECTION, POWDER, LYOPHILIZED, FOR SOLUTION INTRAVENOUS at 05:15

## 2020-08-19 RX ADMIN — Medication 650 MILLIGRAM(S): at 05:47

## 2020-08-19 RX ADMIN — Medication 650 MILLIGRAM(S): at 05:17

## 2020-08-19 RX ADMIN — PYRIDOSTIGMINE BROMIDE 60 MILLIGRAM(S): 60 SOLUTION ORAL at 18:09

## 2020-08-19 RX ADMIN — Medication 650 MILLIGRAM(S): at 11:44

## 2020-08-19 RX ADMIN — OXYCODONE HYDROCHLORIDE 5 MILLIGRAM(S): 5 TABLET ORAL at 02:34

## 2020-08-19 RX ADMIN — PYRIDOSTIGMINE BROMIDE 60 MILLIGRAM(S): 60 SOLUTION ORAL at 05:18

## 2020-08-19 RX ADMIN — PYRIDOSTIGMINE BROMIDE 60 MILLIGRAM(S): 60 SOLUTION ORAL at 14:13

## 2020-08-19 RX ADMIN — OXYCODONE HYDROCHLORIDE 5 MILLIGRAM(S): 5 TABLET ORAL at 02:04

## 2020-08-19 RX ADMIN — OXYCODONE HYDROCHLORIDE 5 MILLIGRAM(S): 5 TABLET ORAL at 15:20

## 2020-08-19 RX ADMIN — OXYCODONE HYDROCHLORIDE 5 MILLIGRAM(S): 5 TABLET ORAL at 14:52

## 2020-08-19 RX ADMIN — Medication 650 MILLIGRAM(S): at 12:15

## 2020-08-19 NOTE — DISCHARGE NOTE NURSING/CASE MANAGEMENT/SOCIAL WORK - PATIENT PORTAL LINK FT
You can access the FollowMyHealth Patient Portal offered by [facility] by registering at the following website: [email or web address]. By joining [employer]’s FollowMyHealth portal, you will also be able to view your health information using other applications (apps) compatible with our system.

## 2020-08-19 NOTE — DISCHARGE NOTE NURSING/CASE MANAGEMENT/SOCIAL WORK - NSSCTYPOFSERV_GEN_ALL_CORE
Scheduled for  start of care  8/20/2020    Skilled RN eval   and reinforcement  of  ostomy care.  Agency will contact you  to set up  time of  visit.  If you have  any questions to same feel free to  contact agency.

## 2020-08-19 NOTE — PROGRESS NOTE ADULT - ATTENDING COMMENTS
Pt feels well with only c/o slight pain with movement. Sandhya LRD, + flatus and nonbloody BM    aaox3  abd soft, incisions c/d/i    WBC 12 T99.5    s/p robotic LAR  LRD  pain control  pt encouraged to use IS q1h  repeat cbc this PM, if WBC lower will d/c home
pt feeling nauseous and lightheaded this am after eating breakfast. Good colostomy output,     aaox3  abd soft, minimal incisional tenderness, midline incision with mirky drainage, staples removed and wound packed. colostomy pink with air and stool in bag    WBC 10    CT scan today to r/o intraabd abscess  cont diet as remi  oob  pain control
pt still c/o pain and had a fever on  pan cx done    aaox3  abd softly distended, tender, incisions c.d.i    wbc 12  cxr with atelectasis and small amount free air c/w recent laparoscopy    will do ct today with rectal contrast to r/o leak
pt feels much better today. No nausea after that episode yesterday am  CT scan without any intraabd abscess, some fluid on the left side but not rim enhancing    aaox3  abd soft, NT/ND, incisions c.d.i, colostomy pink    wbc 13  afeb    d/c home on PO augmentin  f/u in the office in 1 week
pt feels better but still has mild incisional pain, + colostomy function, remi clears    aaox3  abd soft, min distended, tender at incision, incision c/d/i, colostomy pink with air and liquid stool in bag    WBC normal    OOB  advance to LRD  pain control with PO meds  d/c planning
pt reports mild pain, + flatus, and small bloody BM o/n  remi LRD for breakfast    aaox3  abd soft, min distended, dressings c/d/i    s/p robotic LAR with splenic flexure mobilization  OOB  LRD as remi  pain control

## 2020-08-19 NOTE — PROGRESS NOTE ADULT - ASSESSMENT
66yo M with myasthenia gravis POD9 from LAR for sigmoid diverticulitis complicated by anastomic leak now POD6 from RTOR for exlap, washout and Bryn's procedure. Patient is stable and his pain is improving, however his WBC slightly uptrended and CT scan shows small intraabdominal fluid collections likely residual from anastomotic leak but no abscess was noted.    Plan:  - Continue zosyn while inpatient. Plan to dc home with 14 day course of augmentin BID.  - Continue LRD  - Continue wet to dry packing of midline wound. VNS services being arranged.  - DVT ppx  - F/u AM labs  - Encourage OOB and ambulation    [place], PGY-1  Red Team Surgery, [id number] 66yo M with myasthenia gravis POD9 from LAR for sigmoid diverticulitis complicated by anastomic leak now POD6 from RTOR for exlap, washout and Bryn's procedure. Patient is stable and his pain is improving, however his WBC slightly uptrended and CT scan shows small intraabdominal fluid collections likely residual from anastomotic leak but no abscess was noted.    Plan:  - Continue zosyn while inpatient. Plan to dc home with 14 day course of augmentin BID.  - Continue LRD  - Continue wet to dry packing of midline wound. VNS services being arranged.  - DVT ppx  - F/u AM labs  - Encourage OOB and ambulation  - Discharge planning    [place], PGY-1  Red Team Surgery, [id number]

## 2020-08-19 NOTE — PROGRESS NOTE ADULT - SUBJECTIVE AND OBJECTIVE BOX
POD #9/POD#6    24hr events:  - CT Abd/Pelvis with left-sided collections, likely residual from anastomotic leak. Zosyn restarted.    Overnight events:   - No acute events    SUBJECTIVE:  Patient reports his pain is improving. He is tolerating a regular diet. Denies nausea, vomiting, fevers or chills. Has been OOB.    OBJECTIVE:  Vital Signs Last 24 Hrs  T(C): 36.9 (19 Aug 2020 05:08), Max: 37.2 (18 Aug 2020 09:33)  T(F): 98.4 (19 Aug 2020 05:08), Max: 99 (18 Aug 2020 09:33)  HR: 76 (19 Aug 2020 05:08) (71 - 90)  BP: 160/84 (19 Aug 2020 05:08) (141/77 - 178/74)  BP(mean): --  RR: 18 (19 Aug 2020 05:08) (18 - 19)  SpO2: 97% (19 Aug 2020 05:08) (96% - 99%)    Physical Examination:  GEN: NAD, resting quietly  PULM: symmetric chest rise bilaterally, no increased WOB  ABD: soft, mildly tender and distended but no rebound or guarding, two pieces of aquacel removed from midline incision and replaced with new ones, ostomy functioning with stool and gas  EXTR: no LE erythema, moving all extremities      LABS:                        11.6   13.05 )-----------( 485      ( 19 Aug 2020 06:47 )             36.0       08-19    134<L>  |  97  |  10  ----------------------------<  89  4.2   |  27  |  1.29    Ca    9.1      19 Aug 2020 06:46  Phos  3.3     08-19  Mg     2.0     08-19

## 2020-08-21 PROBLEM — K57.92 DIVERTICULITIS OF INTESTINE, PART UNSPECIFIED, WITHOUT PERFORATION OR ABSCESS WITHOUT BLEEDING: Chronic | Status: ACTIVE | Noted: 2020-07-31

## 2020-08-21 PROBLEM — Z86.69 PERSONAL HISTORY OF OTHER DISEASES OF THE NERVOUS SYSTEM AND SENSE ORGANS: Chronic | Status: ACTIVE | Noted: 2020-07-31

## 2020-08-28 ENCOUNTER — APPOINTMENT (OUTPATIENT)
Dept: COLORECTAL SURGERY | Facility: CLINIC | Age: 65
End: 2020-08-28
Payer: COMMERCIAL

## 2020-08-28 VITALS — TEMPERATURE: 97.5 F

## 2020-08-28 PROCEDURE — 99024 POSTOP FOLLOW-UP VISIT: CPT

## 2020-08-28 NOTE — HISTORY OF PRESENT ILLNESS
[FreeTextEntry1] : The patient feels well overall. He still needs an occasional oxycodone for pain. He has a good appetite and good ostomy function. He has VNS TIW for aquacel dressing changes

## 2020-08-28 NOTE — ASSESSMENT
[FreeTextEntry1] : s/p LAR with takeback for Bryn's procedure for anastomotic leak\par \par Pt is encouraged to eat more protein to help the healing process. He will also start a daily fiber supplement to bulk his stool. \par He will cont with local wound care and will f/u in 3 weeks for a wound check\par I explained that the earliest we could go back to reverse the ostomy would be at 2 months (end of oct)

## 2020-08-28 NOTE — PHYSICAL EXAM
[Exam Deferred] : exam was deferred [Normal Breath Sounds] : Normal breath sounds [Wheezing] : no wheezing was heard [Normal Heart Sounds] : normal heart sounds [Normal Rate and Rhythm] : normal rate and rhythm [Alert] : alert [Oriented to Person] : oriented to person [Oriented to Place] : oriented to place [Oriented to Time] : oriented to time [Calm] : calm [de-identified] : soft, NT/ND, midline incision with several openings all with granulation tissue. Staples removed and aquacel replaced. ostomy pink and viable with liquid stool in bag [de-identified] : NAD [de-identified] : NCAT [de-identified] : supple [de-identified] : normal ROM [de-identified] : warm

## 2020-09-20 RX ORDER — NEOMYCIN SULFATE 500 MG/1
500 TABLET ORAL
Qty: 3 | Refills: 0 | Status: COMPLETED | COMMUNITY
Start: 2020-07-21 | End: 2020-08-10

## 2020-09-20 RX ORDER — METRONIDAZOLE 500 MG/1
500 TABLET ORAL
Qty: 3 | Refills: 0 | Status: COMPLETED | COMMUNITY
Start: 2020-07-21 | End: 2020-08-10

## 2020-10-07 ENCOUNTER — APPOINTMENT (OUTPATIENT)
Dept: COLORECTAL SURGERY | Facility: CLINIC | Age: 65
End: 2020-10-07
Payer: COMMERCIAL

## 2020-10-07 VITALS — TEMPERATURE: 97.2 F

## 2020-10-07 PROCEDURE — 99024 POSTOP FOLLOW-UP VISIT: CPT

## 2020-10-07 NOTE — PHYSICAL EXAM
[Exam Deferred] : exam was deferred [Normal Breath Sounds] : Normal breath sounds [Wheezing] : no wheezing was heard [Normal Heart Sounds] : normal heart sounds [Normal Rate and Rhythm] : normal rate and rhythm [Alert] : alert [Oriented to Person] : oriented to person [Oriented to Place] : oriented to place [Oriented to Time] : oriented to time [Calm] : calm [de-identified] : soft, NT/ND, midline incision with one small open area ~ 3mm wide, colostomy pink [de-identified] : NAD [de-identified] : NCAT [de-identified] : supple [de-identified] : normal ROM [de-identified] : warm

## 2020-10-07 NOTE — HISTORY OF PRESENT ILLNESS
[FreeTextEntry1] : The patient feels well. he has a good appetite and good colostomy function. He still has VNS twice a week for aquacel dressing changes.

## 2020-10-07 NOTE — ASSESSMENT
[FreeTextEntry1] : Pt healing well. Will plan for colostomy reversal in another 4 weeks. \par R/B/A of robotic/laparoscopic, possible open colostomy reversal were d/w him including but not limited to pain, bleeding, infection, anastomotic leak, possibility of needing diverting ileostomy, ileus, wound complications, MI, stroke, DVT, PE and death. \par ERP prep instructions given\par \par The following has been addressed with the patient during their preop visit. \par \par o	Antibiotics ordered\par o	Fasting times reviewed\par o	Bowel prep instructions given\par o	Oral carbohydrate given\par o	Goals for nutrition intake/ diet information given\par o	Pain management information given\par o	Discharge criteria & expected hospital stay\par \par

## 2020-11-10 ENCOUNTER — OUTPATIENT (OUTPATIENT)
Dept: OUTPATIENT SERVICES | Facility: HOSPITAL | Age: 65
LOS: 1 days | End: 2020-11-10
Payer: COMMERCIAL

## 2020-11-10 VITALS
RESPIRATION RATE: 18 BRPM | DIASTOLIC BLOOD PRESSURE: 79 MMHG | TEMPERATURE: 98 F | SYSTOLIC BLOOD PRESSURE: 146 MMHG | HEIGHT: 69 IN | WEIGHT: 164.02 LBS | HEART RATE: 68 BPM | OXYGEN SATURATION: 100 %

## 2020-11-10 DIAGNOSIS — Z29.9 ENCOUNTER FOR PROPHYLACTIC MEASURES, UNSPECIFIED: ICD-10-CM

## 2020-11-10 DIAGNOSIS — Z98.89 OTHER SPECIFIED POSTPROCEDURAL STATES: Chronic | ICD-10-CM

## 2020-11-10 DIAGNOSIS — K57.92 DIVERTICULITIS OF INTESTINE, PART UNSPECIFIED, WITHOUT PERFORATION OR ABSCESS WITHOUT BLEEDING: ICD-10-CM

## 2020-11-10 DIAGNOSIS — Z93.3 COLOSTOMY STATUS: Chronic | ICD-10-CM

## 2020-11-10 DIAGNOSIS — Z01.818 ENCOUNTER FOR OTHER PREPROCEDURAL EXAMINATION: ICD-10-CM

## 2020-11-10 DIAGNOSIS — K57.32 DIVERTICULITIS OF LARGE INTESTINE WITHOUT PERFORATION OR ABSCESS WITHOUT BLEEDING: ICD-10-CM

## 2020-11-10 DIAGNOSIS — I10 ESSENTIAL (PRIMARY) HYPERTENSION: ICD-10-CM

## 2020-11-10 DIAGNOSIS — G70.00 MYASTHENIA GRAVIS WITHOUT (ACUTE) EXACERBATION: ICD-10-CM

## 2020-11-10 DIAGNOSIS — Z91.89 OTHER SPECIFIED PERSONAL RISK FACTORS, NOT ELSEWHERE CLASSIFIED: ICD-10-CM

## 2020-11-10 DIAGNOSIS — Z98.890 OTHER SPECIFIED POSTPROCEDURAL STATES: Chronic | ICD-10-CM

## 2020-11-10 LAB
ALBUMIN SERPL ELPH-MCNC: 4.8 G/DL — SIGNIFICANT CHANGE UP (ref 3.3–5)
ALP SERPL-CCNC: 77 U/L — SIGNIFICANT CHANGE UP (ref 40–120)
ALT FLD-CCNC: 18 U/L — SIGNIFICANT CHANGE UP (ref 10–45)
ANION GAP SERPL CALC-SCNC: 11 MMOL/L — SIGNIFICANT CHANGE UP (ref 5–17)
AST SERPL-CCNC: 29 U/L — SIGNIFICANT CHANGE UP (ref 10–40)
BILIRUB SERPL-MCNC: 1.1 MG/DL — SIGNIFICANT CHANGE UP (ref 0.2–1.2)
BUN SERPL-MCNC: 17 MG/DL — SIGNIFICANT CHANGE UP (ref 7–23)
CALCIUM SERPL-MCNC: 9.9 MG/DL — SIGNIFICANT CHANGE UP (ref 8.4–10.5)
CHLORIDE SERPL-SCNC: 101 MMOL/L — SIGNIFICANT CHANGE UP (ref 96–108)
CO2 SERPL-SCNC: 26 MMOL/L — SIGNIFICANT CHANGE UP (ref 22–31)
CREAT SERPL-MCNC: 1.14 MG/DL — SIGNIFICANT CHANGE UP (ref 0.5–1.3)
GLUCOSE SERPL-MCNC: 72 MG/DL — SIGNIFICANT CHANGE UP (ref 70–99)
HCT VFR BLD CALC: 46.7 % — SIGNIFICANT CHANGE UP (ref 39–50)
HGB BLD-MCNC: 14.8 G/DL — SIGNIFICANT CHANGE UP (ref 13–17)
MCHC RBC-ENTMCNC: 27.8 PG — SIGNIFICANT CHANGE UP (ref 27–34)
MCHC RBC-ENTMCNC: 31.7 GM/DL — LOW (ref 32–36)
MCV RBC AUTO: 87.8 FL — SIGNIFICANT CHANGE UP (ref 80–100)
NRBC # BLD: 0 /100 WBCS — SIGNIFICANT CHANGE UP (ref 0–0)
PLATELET # BLD AUTO: 283 K/UL — SIGNIFICANT CHANGE UP (ref 150–400)
POTASSIUM SERPL-MCNC: 4.2 MMOL/L — SIGNIFICANT CHANGE UP (ref 3.5–5.3)
POTASSIUM SERPL-SCNC: 4.2 MMOL/L — SIGNIFICANT CHANGE UP (ref 3.5–5.3)
PROT SERPL-MCNC: 7.6 G/DL — SIGNIFICANT CHANGE UP (ref 6–8.3)
RBC # BLD: 5.32 M/UL — SIGNIFICANT CHANGE UP (ref 4.2–5.8)
RBC # FLD: 14.7 % — HIGH (ref 10.3–14.5)
RH IG SCN BLD-IMP: POSITIVE — SIGNIFICANT CHANGE UP
SARS-COV-2 RNA SPEC QL NAA+PROBE: SIGNIFICANT CHANGE UP
SODIUM SERPL-SCNC: 138 MMOL/L — SIGNIFICANT CHANGE UP (ref 135–145)
WBC # BLD: 4.87 K/UL — SIGNIFICANT CHANGE UP (ref 3.8–10.5)
WBC # FLD AUTO: 4.87 K/UL — SIGNIFICANT CHANGE UP (ref 3.8–10.5)

## 2020-11-10 PROCEDURE — 86922 COMPATIBILITY TEST ANTIGLOB: CPT

## 2020-11-10 PROCEDURE — 85027 COMPLETE CBC AUTOMATED: CPT

## 2020-11-10 PROCEDURE — 86900 BLOOD TYPING SEROLOGIC ABO: CPT

## 2020-11-10 PROCEDURE — 86880 COOMBS TEST DIRECT: CPT

## 2020-11-10 PROCEDURE — 86870 RBC ANTIBODY IDENTIFICATION: CPT

## 2020-11-10 PROCEDURE — 86905 BLOOD TYPING RBC ANTIGENS: CPT

## 2020-11-10 PROCEDURE — U0003: CPT

## 2020-11-10 PROCEDURE — 86850 RBC ANTIBODY SCREEN: CPT

## 2020-11-10 PROCEDURE — 86901 BLOOD TYPING SEROLOGIC RH(D): CPT

## 2020-11-10 PROCEDURE — 86860 RBC ANTIBODY ELUTION: CPT

## 2020-11-10 PROCEDURE — G0463: CPT

## 2020-11-10 PROCEDURE — 80053 COMPREHEN METABOLIC PANEL: CPT

## 2020-11-10 RX ORDER — CHLORHEXIDINE GLUCONATE 213 G/1000ML
1 SOLUTION TOPICAL ONCE
Refills: 0 | Status: DISCONTINUED | OUTPATIENT
Start: 2020-11-13 | End: 2020-11-16

## 2020-11-10 NOTE — H&P PST ADULT - NEGATIVE ENMT SYMPTOMS
no ear pain/no tinnitus/no sinus symptoms/no hearing difficulty/no nasal discharge/no vertigo/no nasal congestion/no nasal obstruction all other ROS negative except as per HPI

## 2020-11-10 NOTE — H&P PST ADULT - MUSCULOSKELETAL
details… detailed exam normal strength/no joint swelling/no joint erythema/no joint warmth/no calf tenderness/ROM intact/normal

## 2020-11-10 NOTE — H&P PST ADULT - NSICDXPASTMEDICALHX_GEN_ALL_CORE_FT
English
PAST MEDICAL HISTORY:  BPH (benign prostatic hyperplasia)     Diverticulitis 10/2019, 3/2020    Diverticulosis     Eye muscle weakness, right     H/O myasthenia gravis diagnosed 3 years ago - on pyridostigmine    High cholesterol     HTN (hypertension)     Kidney stones

## 2020-11-10 NOTE — H&P PST ADULT - NEUROLOGICAL DETAILS
deep reflexes intact/no spontaneous movement/alert and oriented x 3/sensation intact/responds to pain/responds to verbal commands/normal strength

## 2020-11-10 NOTE — H&P PST ADULT - NSICDXPROBLEM_GEN_ALL_CORE_FT
PROBLEM DIAGNOSES  Problem: Diverticulitis of intestine  Assessment and Plan: Laparoscopic Colostomy Reversal    Problem: Myasthenia gravis  Assessment and Plan: patient instructed to continue with Pyridostigmine as prescribed by Neurologist and to take the morning dose on 11/13 with sips of water    Problem: HTN (hypertension)  Assessment and Plan: instructed to continue with Amlodipine as prescribed    Problem: Need for prophylactic measure  Assessment and Plan: .The Caprini score indicates this patient is at risk for a VTE event (score 3-5).  Most surgical patients in this group would benefit from pharmacologic prophylaxis.  The surgical team will determine the balance between VTE risk and bleeding risk

## 2020-11-10 NOTE — H&P PST ADULT - NEGATIVE MUSCULOSKELETAL SYMPTOMS
no arthritis/no joint swelling/no myalgia/no arthralgia/no stiffness/no muscle cramps/no muscle weakness/no neck pain

## 2020-11-10 NOTE — H&P PST ADULT - NEGATIVE NEUROLOGICAL SYMPTOMS
no hemiparesis/no facial palsy/no syncope/no vertigo/no headache/no confusion/no difficulty walking/no paresthesias/no weakness/no tremors/no loss of sensation/no loss of consciousness/no transient paralysis

## 2020-11-10 NOTE — H&P PST ADULT - ASSESSMENT
ERICKI VTE 2.0 SCORE [CLOT updated 2019]    AGE RELATED RISK FACTORS                                                       MOBILITY RELATED FACTORS  [ ] Age 41-60 years                                            (1 Point)                    [ ] Bed rest                                                        (1 Point)  [x] Age: 61-74 years                                           (2 Points)                  [ ] Plaster cast                                                   (2 Points)  [ ] Age= 75 years                                              (3 Points)                    [ ] Bed bound for more than 72 hours                 (2 Points)    DISEASE RELATED RISK FACTORS                                               GENDER SPECIFIC FACTORS  [ ] Edema in the lower extremities                       (1 Point)              [ ] Pregnancy                                                     (1 Point)  [ ] Varicose veins                                               (1 Point)                     [ ] Post-partum < 6 weeks                                   (1 Point)             [ ] BMI > 25 Kg/m2                                            (1 Point)                     [ ] Hormonal therapy  or oral contraception          (1 Point)                 [ ] Sepsis (in the previous month)                        (1 Point)               [ ] History of pregnancy complications                 (1 point)  [ ] Pneumonia or serious lung disease                                               [ ] Unexplained or recurrent                     (1 Point)           (in the previous month)                               (1 Point)  [ ] Abnormal pulmonary function test                     (1 Point)                 SURGERY RELATED RISK FACTORS  [ ] Acute myocardial infarction                              (1 Point)               [ ]  Section                                             (1 Point)  [ ] Congestive heart failure (in the previous month)  (1 Point)      [ ] Minor surgery                                                  (1 Point)   [ ] Inflammatory bowel disease                             (1 Point)               [ ] Arthroscopic surgery                                        (2 Points)  [ ] Central venous access                                      (2 Points)                [x] General surgery lasting more than 45 minutes (2 points)  [ ] Malignancy- Present or previous                   (2 Points)                [ ] Elective arthroplasty                                         (5 points)    [ ] Stroke (in the previous month)                          (5 Points)                                                                                                                                                           HEMATOLOGY RELATED FACTORS                                                 TRAUMA RELATED RISK FACTORS  [ ] Prior episodes of VTE                                     (3 Points)                [ ] Fracture of the hip, pelvis, or leg                       (5 Points)  [ ] Positive family history for VTE                         (3 Points)             [ ] Acute spinal cord injury (in the previous month)  (5 Points)  [ ] Prothrombin 23234 A                                     (3 Points)               [ ] Paralysis  (less than 1 month)                             (5 Points)  [ ] Factor V Leiden                                             (3 Points)                  [ ] Multiple Trauma within 1 month                        (5 Points)  [ ] Lupus anticoagulants                                     (3 Points)                                                           [ ] Anticardiolipin antibodies                               (3 Points)                                                       [ ] High homocysteine in the blood                      (3 Points)                                             [ ] Other congenital or acquired thrombophilia      (3 Points)                                                [ ] Heparin induced thrombocytopenia                  (3 Points)                                     Total Score [4]

## 2020-11-10 NOTE — H&P PST ADULT - NSICDXPASTSURGICALHX_GEN_ALL_CORE_FT
PAST SURGICAL HISTORY:  H/O colonoscopy 3 years ago    History of appendectomy 25 years ago    Status post Bryn procedure

## 2020-11-10 NOTE — H&P PST ADULT - NEGATIVE OPHTHALMOLOGIC SYMPTOMS
no discharge L/no lacrimation L/no lacrimation R/no blurred vision R/no discharge R/no diplopia/no blurred vision L/no photophobia

## 2020-11-10 NOTE — H&P PST ADULT - HISTORY OF PRESENT ILLNESS
65 year old male with PMH of Myasthenia Gravis, Vertigo, HLD, Diverticulitis    covid swab at PST 11/10 65 year old male with PMH of Myasthenia Gravis, Vertigo, HLD, Sigmoid Diverticulitis, hospitalized with initial bout in Oct 2019. Patient had 2 episodes within 6 months and required prolonged IV antibiotics via PICC line. He subsequently pursued surgery in August; s/p LAR with takeback for Gomez's Procedure for Anastomotic leak. He now presents to PST for screening prior to Laparoscopic Colostomy Reversal on 11/13    covid swab at PST 11/10

## 2020-11-11 LAB
A1C WITH ESTIMATED AVERAGE GLUCOSE RESULT: 5.1 % — SIGNIFICANT CHANGE UP (ref 4–5.6)
ANTIBODY ID 1_1: SIGNIFICANT CHANGE UP
BLD GP AB SCN SERPL QL: POSITIVE — SIGNIFICANT CHANGE UP
DAT C3-SP REAG RBC QL: NEGATIVE — SIGNIFICANT CHANGE UP
ELUATE ANTIBODY 1: SIGNIFICANT CHANGE UP
ESTIMATED AVERAGE GLUCOSE: 100 MG/DL — SIGNIFICANT CHANGE UP (ref 68–114)

## 2020-11-11 PROCEDURE — 86077 PHYS BLOOD BANK SERV XMATCH: CPT

## 2020-11-12 ENCOUNTER — TRANSCRIPTION ENCOUNTER (OUTPATIENT)
Age: 65
End: 2020-11-12

## 2020-11-13 ENCOUNTER — APPOINTMENT (OUTPATIENT)
Dept: COLORECTAL SURGERY | Facility: HOSPITAL | Age: 65
End: 2020-11-13
Payer: COMMERCIAL

## 2020-11-13 ENCOUNTER — RESULT REVIEW (OUTPATIENT)
Age: 65
End: 2020-11-13

## 2020-11-13 ENCOUNTER — INPATIENT (INPATIENT)
Facility: HOSPITAL | Age: 65
LOS: 2 days | Discharge: HOME | DRG: 330 | End: 2020-11-16
Attending: SURGERY | Admitting: SURGERY
Payer: COMMERCIAL

## 2020-11-13 VITALS
SYSTOLIC BLOOD PRESSURE: 157 MMHG | WEIGHT: 164.02 LBS | HEART RATE: 68 BPM | TEMPERATURE: 98 F | OXYGEN SATURATION: 99 % | DIASTOLIC BLOOD PRESSURE: 82 MMHG | HEIGHT: 69 IN | RESPIRATION RATE: 16 BRPM

## 2020-11-13 DIAGNOSIS — Z98.89 OTHER SPECIFIED POSTPROCEDURAL STATES: Chronic | ICD-10-CM

## 2020-11-13 DIAGNOSIS — Z93.3 COLOSTOMY STATUS: Chronic | ICD-10-CM

## 2020-11-13 DIAGNOSIS — Z98.890 OTHER SPECIFIED POSTPROCEDURAL STATES: Chronic | ICD-10-CM

## 2020-11-13 DIAGNOSIS — K57.32 DIVERTICULITIS OF LARGE INTESTINE WITHOUT PERFORATION OR ABSCESS WITHOUT BLEEDING: ICD-10-CM

## 2020-11-13 LAB
BLD GP AB SCN SERPL QL: POSITIVE — SIGNIFICANT CHANGE UP
GLUCOSE BLDC GLUCOMTR-MCNC: 128 MG/DL — HIGH (ref 70–99)
RH IG SCN BLD-IMP: POSITIVE — SIGNIFICANT CHANGE UP

## 2020-11-13 PROCEDURE — 88307 TISSUE EXAM BY PATHOLOGIST: CPT | Mod: 26

## 2020-11-13 PROCEDURE — 45330 DIAGNOSTIC SIGMOIDOSCOPY: CPT | Mod: 78

## 2020-11-13 PROCEDURE — 88304 TISSUE EXAM BY PATHOLOGIST: CPT | Mod: 26

## 2020-11-13 PROCEDURE — 44626 REPAIR BOWEL OPENING: CPT | Mod: 78

## 2020-11-13 RX ORDER — NALOXONE HYDROCHLORIDE 4 MG/.1ML
0.1 SPRAY NASAL
Refills: 0 | Status: DISCONTINUED | OUTPATIENT
Start: 2020-11-13 | End: 2020-11-16

## 2020-11-13 RX ORDER — ACETAMINOPHEN 500 MG
1000 TABLET ORAL EVERY 6 HOURS
Refills: 0 | Status: COMPLETED | OUTPATIENT
Start: 2020-11-13 | End: 2020-11-14

## 2020-11-13 RX ORDER — ONDANSETRON 8 MG/1
4 TABLET, FILM COATED ORAL EVERY 6 HOURS
Refills: 0 | Status: DISCONTINUED | OUTPATIENT
Start: 2020-11-13 | End: 2020-11-16

## 2020-11-13 RX ORDER — INFLUENZA VIRUS VACCINE 15; 15; 15; 15 UG/.5ML; UG/.5ML; UG/.5ML; UG/.5ML
0.5 SUSPENSION INTRAMUSCULAR ONCE
Refills: 0 | Status: DISCONTINUED | OUTPATIENT
Start: 2020-11-13 | End: 2020-11-16

## 2020-11-13 RX ORDER — ENOXAPARIN SODIUM 100 MG/ML
40 INJECTION SUBCUTANEOUS DAILY
Refills: 0 | Status: DISCONTINUED | OUTPATIENT
Start: 2020-11-13 | End: 2020-11-16

## 2020-11-13 RX ORDER — CEFOTETAN DISODIUM 1 G
2 VIAL (EA) INJECTION ONCE
Refills: 0 | Status: DISCONTINUED | OUTPATIENT
Start: 2020-11-13 | End: 2020-11-13

## 2020-11-13 RX ORDER — LIDOCAINE HCL 20 MG/ML
0.2 VIAL (ML) INJECTION ONCE
Refills: 0 | Status: DISCONTINUED | OUTPATIENT
Start: 2020-11-13 | End: 2020-11-13

## 2020-11-13 RX ORDER — HYDROMORPHONE HYDROCHLORIDE 2 MG/ML
0.5 INJECTION INTRAMUSCULAR; INTRAVENOUS; SUBCUTANEOUS
Refills: 0 | Status: DISCONTINUED | OUTPATIENT
Start: 2020-11-13 | End: 2020-11-16

## 2020-11-13 RX ORDER — HYDROMORPHONE HYDROCHLORIDE 2 MG/ML
30 INJECTION INTRAMUSCULAR; INTRAVENOUS; SUBCUTANEOUS
Refills: 0 | Status: DISCONTINUED | OUTPATIENT
Start: 2020-11-13 | End: 2020-11-16

## 2020-11-13 RX ORDER — HYDROMORPHONE HYDROCHLORIDE 2 MG/ML
0.5 INJECTION INTRAMUSCULAR; INTRAVENOUS; SUBCUTANEOUS
Refills: 0 | Status: DISCONTINUED | OUTPATIENT
Start: 2020-11-13 | End: 2020-11-13

## 2020-11-13 RX ORDER — FENTANYL CITRATE 50 UG/ML
50 INJECTION INTRAVENOUS
Refills: 0 | Status: DISCONTINUED | OUTPATIENT
Start: 2020-11-13 | End: 2020-11-13

## 2020-11-13 RX ORDER — PYRIDOSTIGMINE BROMIDE 60 MG/5ML
60 SOLUTION ORAL THREE TIMES A DAY
Refills: 0 | Status: DISCONTINUED | OUTPATIENT
Start: 2020-11-13 | End: 2020-11-16

## 2020-11-13 RX ORDER — MECLIZINE HCL 12.5 MG
25 TABLET ORAL THREE TIMES A DAY
Refills: 0 | Status: DISCONTINUED | OUTPATIENT
Start: 2020-11-13 | End: 2020-11-16

## 2020-11-13 RX ORDER — GABAPENTIN 400 MG/1
600 CAPSULE ORAL ONCE
Refills: 0 | Status: COMPLETED | OUTPATIENT
Start: 2020-11-13 | End: 2020-11-13

## 2020-11-13 RX ORDER — CELECOXIB 200 MG/1
400 CAPSULE ORAL ONCE
Refills: 0 | Status: COMPLETED | OUTPATIENT
Start: 2020-11-13 | End: 2020-11-13

## 2020-11-13 RX ORDER — ONDANSETRON 8 MG/1
4 TABLET, FILM COATED ORAL ONCE
Refills: 0 | Status: DISCONTINUED | OUTPATIENT
Start: 2020-11-13 | End: 2020-11-13

## 2020-11-13 RX ORDER — SODIUM CHLORIDE 9 MG/ML
3 INJECTION INTRAMUSCULAR; INTRAVENOUS; SUBCUTANEOUS EVERY 8 HOURS
Refills: 0 | Status: DISCONTINUED | OUTPATIENT
Start: 2020-11-13 | End: 2020-11-13

## 2020-11-13 RX ORDER — KETOROLAC TROMETHAMINE 30 MG/ML
15 SYRINGE (ML) INJECTION EVERY 8 HOURS
Refills: 0 | Status: DISCONTINUED | OUTPATIENT
Start: 2020-11-13 | End: 2020-11-14

## 2020-11-13 RX ORDER — PYRIDOSTIGMINE BROMIDE 60 MG/5ML
180 SOLUTION ORAL AT BEDTIME
Refills: 0 | Status: DISCONTINUED | OUTPATIENT
Start: 2020-11-13 | End: 2020-11-16

## 2020-11-13 RX ORDER — SODIUM CHLORIDE 9 MG/ML
1000 INJECTION, SOLUTION INTRAVENOUS
Refills: 0 | Status: DISCONTINUED | OUTPATIENT
Start: 2020-11-13 | End: 2020-11-14

## 2020-11-13 RX ORDER — AMLODIPINE BESYLATE 2.5 MG/1
10 TABLET ORAL DAILY
Refills: 0 | Status: DISCONTINUED | OUTPATIENT
Start: 2020-11-13 | End: 2020-11-16

## 2020-11-13 RX ADMIN — HYDROMORPHONE HYDROCHLORIDE 30 MILLILITER(S): 2 INJECTION INTRAMUSCULAR; INTRAVENOUS; SUBCUTANEOUS at 19:55

## 2020-11-13 RX ADMIN — Medication 15 MILLIGRAM(S): at 22:30

## 2020-11-13 RX ADMIN — HYDROMORPHONE HYDROCHLORIDE 0.5 MILLIGRAM(S): 2 INJECTION INTRAMUSCULAR; INTRAVENOUS; SUBCUTANEOUS at 13:55

## 2020-11-13 RX ADMIN — HYDROMORPHONE HYDROCHLORIDE 0.5 MILLIGRAM(S): 2 INJECTION INTRAMUSCULAR; INTRAVENOUS; SUBCUTANEOUS at 13:25

## 2020-11-13 RX ADMIN — ENOXAPARIN SODIUM 40 MILLIGRAM(S): 100 INJECTION SUBCUTANEOUS at 18:58

## 2020-11-13 RX ADMIN — Medication 400 MILLIGRAM(S): at 22:25

## 2020-11-13 RX ADMIN — GABAPENTIN 600 MILLIGRAM(S): 400 CAPSULE ORAL at 06:57

## 2020-11-13 RX ADMIN — Medication 15 MILLIGRAM(S): at 13:15

## 2020-11-13 RX ADMIN — Medication 400 MILLIGRAM(S): at 16:05

## 2020-11-13 RX ADMIN — HYDROMORPHONE HYDROCHLORIDE 30 MILLILITER(S): 2 INJECTION INTRAMUSCULAR; INTRAVENOUS; SUBCUTANEOUS at 14:11

## 2020-11-13 RX ADMIN — HYDROMORPHONE HYDROCHLORIDE 0.5 MILLIGRAM(S): 2 INJECTION INTRAMUSCULAR; INTRAVENOUS; SUBCUTANEOUS at 14:10

## 2020-11-13 RX ADMIN — Medication 1000 MILLIGRAM(S): at 16:20

## 2020-11-13 RX ADMIN — HYDROMORPHONE HYDROCHLORIDE 0.5 MILLIGRAM(S): 2 INJECTION INTRAMUSCULAR; INTRAVENOUS; SUBCUTANEOUS at 13:40

## 2020-11-13 RX ADMIN — HYDROMORPHONE HYDROCHLORIDE 30 MILLILITER(S): 2 INJECTION INTRAMUSCULAR; INTRAVENOUS; SUBCUTANEOUS at 21:01

## 2020-11-13 RX ADMIN — PYRIDOSTIGMINE BROMIDE 60 MILLIGRAM(S): 60 SOLUTION ORAL at 22:30

## 2020-11-13 RX ADMIN — PYRIDOSTIGMINE BROMIDE 60 MILLIGRAM(S): 60 SOLUTION ORAL at 13:39

## 2020-11-13 RX ADMIN — Medication 15 MILLIGRAM(S): at 13:30

## 2020-11-13 RX ADMIN — CELECOXIB 400 MILLIGRAM(S): 200 CAPSULE ORAL at 06:57

## 2020-11-13 RX ADMIN — PYRIDOSTIGMINE BROMIDE 180 MILLIGRAM(S): 60 SOLUTION ORAL at 22:29

## 2020-11-13 NOTE — PRE-OP CHECKLIST - COMMENTS
SUBJECTIVE:                                                      Melissa Porras is a 6 year old female, here for a routine health maintenance visit.    Patient was roomed by: Ondina Goff    [facility]     Family/Social History  Patient accompanied by:  Mother and sister  Questions or concerns?: YES (self soothing behaviors and infected left big toe)    Forms to complete? No  Child lives with::  Mother, father and sister  Who takes care of your child?:    Languages spoken in the home:  English  Recent family changes/ special stressors?:  None noted    Safety  Is your child around anyone who smokes?  No    TB Exposure:     No TB exposure    Car seat or booster in back seat?  Yes  Helmet worn for bicycle/roller blades/skateboard?  Yes    Home Safety Survey:      Firearms in the home?: YES          Are trigger locks present?  Yes        Is ammunition stored separately? Yes     Child ever home alone?  No    Daily Activities    Dental     Dental provider: patient has a dental home    Risks: a parent has had a cavity in past 3 years    Water source:  City water    Diet and Exercise     Child gets at least 4 servings fruit or vegetables daily: Yes    Consumes beverages other than lowfat white milk or water: No    Dairy/calcium sources: 2% milk    Calcium servings per day: 3    Child gets at least 60 minutes per day of active play: Yes    TV in child's room: No    Sleep       Sleep concerns: no concerns- sleeps well through night     Bedtime: 21:00     Sleep duration (hours): 9    Elimination       Urinary frequency:4-6 times per 24 hours     Stool frequency: 1-3 times per 24 hours     Stool consistency: soft     Elimination problems:  None     Toilet training status:  Toilet trained- day and night    Media     Types of media used: iPad and video/dvd/tv    Daily use of media (hours): 0.5    School    Current schooling: day care    Where child is or will attend : [employer]        VISION   No corrective lenses (H Plus Lens Screening required)  Tool used: Jeffery  Right eye: 10/12.5 (20/25)  Left eye: 10/12.5 (20/25)  Two Line Difference: No  Visual Acuity: Pass  H Plus Lens Screening: Pass    Vision Assessment: normal      HEARING  Right Ear:      1000 Hz RESPONSE- on Level: 40 db (Conditioning sound)   1000 Hz: RESPONSE- on Level:   20 db    2000 Hz: RESPONSE- on Level:   20 db    4000 Hz: RESPONSE- on Level:   20 db     Left Ear:      4000 Hz: RESPONSE- on Level:   20 db    2000 Hz: RESPONSE- on Level:   20 db    1000 Hz: RESPONSE- on Level:   20 db     500 Hz: RESPONSE- on Level:   20 db     Right Ear:    500 Hz: RESPONSE- on Level: 25 db    Hearing Acuity: Pass    Hearing Assessment: normal    ============================    DEVELOPMENT/SOCIAL-EMOTIONAL SCREEN  Electronic PSC   PSC SCORES 11/7/2018   Inattentive / Hyperactive Symptoms Subtotal 0   Externalizing Symptoms Subtotal 3   Internalizing Symptoms Subtotal 0   PSC - 17 Total Score 3      no followup necessary    PROBLEM LIST  Patient Active Problem List   Diagnosis     Café au lait spot     Iron deficiency     MEDICATIONS  Current Outpatient Prescriptions   Medication Sig Dispense Refill     albuterol (2.5 MG/3ML) 0.083% nebulizer solution Take 1 vial (2.5 mg) by nebulization every 4 hours as needed for shortness of breath / dyspnea or wheezing (Patient not taking: Reported on 5/9/2017) 25 vial 1      ALLERGY  No Known Allergies    IMMUNIZATIONS  Immunization History   Administered Date(s) Administered     DTAP (<7y) 01/21/2015     DTaP / Hep B / IPV 2013, 02/26/2014, 04/16/2014     HEPA 10/22/2014, 04/22/2015     HepB 2013     Hib (PRP-T) 2013, 02/26/2014, 04/16/2014, 01/21/2015     Influenza Vaccine IM 3yrs+ 4 Valent IIV4 10/17/2017, 10/03/2018     Influenza Vaccine IM Ages 6-35 Months 4 Valent (PF) 10/22/2014, 01/21/2015, 10/21/2015     MMR 10/22/2014, 05/31/2017     Pneumo Conj 13-V  "(2010&after) 2013, 02/26/2014, 04/16/2014, 01/21/2015     Rotavirus, monovalent, 2-dose 2013, 02/26/2014     Varicella 10/22/2014       HEALTH HISTORY SINCE LAST VISIT  No surgery, major illness or injury since last physical exam  She bobs her head at home to 'relax' when she gets upset.  For several minutes only.  Does not do it at school.  Does not disrupt activities.    Has been soaking toe and applying topical antibiotics and not improving.     ROS  Constitutional, eye, ENT, skin, respiratory, cardiac, and GI are normal except as otherwise noted.    OBJECTIVE:   EXAM  /67  Pulse 73  Temp 98.2  F (36.8  C) (Oral)  Ht 3' 3.57\" (1.005 m)  Wt 36 lb 3.2 oz (16.4 kg)  BMI 16.26 kg/m2  5 %ile based on CDC 2-20 Years stature-for-age data using vitals from 11/7/2018.  24 %ile based on CDC 2-20 Years weight-for-age data using vitals from 11/7/2018.  77 %ile based on CDC 2-20 Years BMI-for-age data using vitals from 11/7/2018.  Blood pressure percentiles are 90.6 % systolic and 94.4 % diastolic based on the August 2017 AAP Clinical Practice Guideline. This reading is in the elevated blood pressure range (BP >= 90th percentile).  GEN: Well developed, well nourished, no distress  HEAD: Normocephalic, atraumatic  EYES: no discharge or injection, extraocular muscles intact, pupils equal and reactive to light, symmetric light reflex  EARS: canals clear, TMs WNL  NOSE: no edema or discharge  MOUTH: MMM, no erythema or exudate, teeth WNL  NECK: supple, full ROM  RESP: no inc work of breathing, clear to auscultation bilat, good air entry bilat  BREAST: normal, olivia 1  CVS: Regular rate and rhythm, no murmur or extra heart sounds  ABD: soft, nontender, no mass, no hepatosplenomegaly   Female: WNL external genitalia, olivia 1  MSK: no deformities, full ROM all extremities  SKIN   warm and well perfused , left big toe lateral edge with redness, swelling, and tender to palpation.  No fluctuance or drainage.  "   NEURO: Nonfocal     ASSESSMENT/PLAN:   1. Encounter for routine child health examination w/o abnormal findings  6 year well child visit, Normal Growth & Development   - PURE TONE HEARING TEST, AIR  - SCREENING, VISUAL ACUITY, QUANTITATIVE, BILAT  - BEHAVIORAL / EMOTIONAL ASSESSMENT [54629]  - Screening Questionnaire for Immunizations  - DTAP-IPV VACC 4-6 YR IM [41890]  - CHICKEN POX VACCINE (VARICELLA) [31821]    2. Paronychia of toe, left  - cephalexin (KEFLEX) 250 MG/5ML suspension; Take 6.2 mLs (310 mg) by mouth 2 times daily for 7 days  Dispense: 86.8 mL; Refill: 0  - OFFICE/OUTPT VISIT,EST,LEVL III    Anticipatory Guidance  The following topics were discussed:  SOCIAL/ FAMILY:    Positive discipline    Dealing with anger/ acknowledge feelings    Given a book from [employer] & Read  HEALTH/ SAFETY:    Good/bad touch    Preventive Care Plan  Immunizations    See orders in EpicCare.  I reviewed the signs and symptoms of adverse effects and when to seek medical care if they should arise.  Referrals/Ongoing Specialty care: No   See other orders in EpicCare.  BMI at 77 %ile based on CDC 2-20 Years BMI-for-age data using vitals from 11/7/2018. No weight concerns.  Dental visit recommended: Dental home established, continue care every 6 months      FOLLOW-UP:  Return in about 1 year (around 11/7/2019) for 7 year Preventative Care visit.  in 1 year for a Preventive Care visit    Resources  Goal Tracker: Be More Active  Goal Tracker: Less Screen Time  Goal Tracker: Drink More Water  Goal Tracker: Eat More Fruits and Veggies  [place] Child and Teen Checkups (C&TC) Schedule of Age-Related Screening Standards    Nory Garza MD  [facility]   ensure clear

## 2020-11-13 NOTE — PRE-ANESTHESIA EVALUATION ADULT - NSANTHPMHFT_GEN_ALL_CORE
PMH of Myasthenia Gravis (diagnosed 3 years ago, well controlled on pyridostigmine), Vertigo, HLD, Sigmoid Diverticulitis, hospitalized with initial bout in Oct 2019. Patient had 2 episodes within 6 months and required prolonged IV antibiotics via PICC line. He subsequently pursued surgery in August; s/p LAR with takeback for Gomez's Procedure for Anastomotic leak. Here for ostomy reversal PMH of Myasthenia Gravis (diagnosed 3 years ago, well controlled on pyridostigmine), Vertigo, HLD, Sigmoid Diverticulitis, hospitalized with initial bout in Oct 2019. Patient had 2 episodes within 6 months and required prolonged IV antibiotics via PICC line. He subsequently pursued surgery in August; s/p LAR with take-back for Gomez's Procedure for Anastomotic leak. Here for ostomy reversal.    11/11/2020 PCP Note from in chart giving medical clearance

## 2020-11-13 NOTE — BRIEF OPERATIVE NOTE - OPERATION/FINDINGS
Extensive adhesions, laparoscope placed but unable to achieve visualization, and converted to open procedure. Rectal stump identified, and re-resected with contour stapler. Mobilized to facilitate stapler passage. End colostomy taken down, splenic flexure mobilized to achieve length. Small defect in descending colon repaired with vicryl sutures. End to anterior colorectal anastamosis created with 29 mm EEA, insufflation test negative for leak.

## 2020-11-13 NOTE — BRIEF OPERATIVE NOTE - NSICDXBRIEFPROCEDURE_GEN_ALL_CORE_FT
PROCEDURES:  Reversal of Bryn procedure 13-Nov-2020 11:56:16  Sapphire Sanon   PROCEDURES:  Diagnostic laparoscopy 16-Nov-2020 14:00:53  Ryan Martinez  Flexible sigmoidoscopy 16-Nov-2020 14:00:41  Ryan Martinez  Reversal of Bryn procedure 13-Nov-2020 11:56:16  Sapphire Sanon

## 2020-11-13 NOTE — CHART NOTE - NSCHARTNOTEFT_GEN_A_CORE
Surgery Post-Op Note    Pre-Op Dx: s/p Bryn procedure  Procedure: Reversal of Bryn procedure  Surgeon: Dr. Martinez, Dr. Sanon    SUBJECTIVE:  Pt seen and examined at the bedside. Pt w/ no complaints. Reports nausea earlier which responded to zofran. Denies V, CP, SOB. Pain controlled with medication. Bowel fxn -/-. Has not been out of bed for ambulation. Voiding via catheter    OBJECTIVE:  Vital Signs Last 24 Hrs  T(C): 36.3 (13 Nov 2020 12:20), Max: 36.9 (13 Nov 2020 06:27)  T(F): 97.3 (13 Nov 2020 12:20), Max: 98.4 (13 Nov 2020 06:27)  HR: 63 (13 Nov 2020 17:30) (62 - 75)  BP: 135/75 (13 Nov 2020 17:30) (116/66 - 157/82)  BP(mean): 99 (13 Nov 2020 17:30) (84 - 111)  RR: 16 (13 Nov 2020 17:30) (16 - 16)  SpO2: 98% (13 Nov 2020 17:30) (98% - 100%)    Physical Exam:  General: NAD, resting comfortably in bed  Pulmonary: Nonlabored breathing, no respiratory distress  Cardiovascular: NSR  Abdominal: soft, ATTP. ND  Incision: C/D/I   Drains: catheter in place  Extremities: WWP    LABS:  POCT Blood Glucose.: 128 mg/dL (13 Nov 2020 06:23)  ABO Interpretation: B (11-13 @ 06:57)      ASSESSMENT: 65y Male now 4 hours s/p Bryn reversal. Recovering well in PACU.    PLAN:  - Pain control: IV tylenol, PCA  - Encourage IS  - Nausea control PRN  - Monitor vitals  - Diet: CLD  - Monitor I+Os  - OOB/ Ambulate  - DVT ppx: Lovenox    Red Team Surgery  [id number]

## 2020-11-13 NOTE — PRE-ANESTHESIA EVALUATION ADULT - NSANTHAIRWAYFT_ENT_ALL_CORE
adequate mouth opening, MP 2, neck ROM wnl, TMD wnl. some teeth missing (partial dentures, removed), but denies loose teeth
Normal rate, regular rhythm.  Heart sounds S1, S2.  No murmurs, rubs or gallops.

## 2020-11-14 LAB
ANION GAP SERPL CALC-SCNC: 10 MMOL/L — SIGNIFICANT CHANGE UP (ref 5–17)
BUN SERPL-MCNC: 11 MG/DL — SIGNIFICANT CHANGE UP (ref 7–23)
CALCIUM SERPL-MCNC: 8.6 MG/DL — SIGNIFICANT CHANGE UP (ref 8.4–10.5)
CHLORIDE SERPL-SCNC: 99 MMOL/L — SIGNIFICANT CHANGE UP (ref 96–108)
CO2 SERPL-SCNC: 25 MMOL/L — SIGNIFICANT CHANGE UP (ref 22–31)
CREAT SERPL-MCNC: 1.16 MG/DL — SIGNIFICANT CHANGE UP (ref 0.5–1.3)
GLUCOSE SERPL-MCNC: 97 MG/DL — SIGNIFICANT CHANGE UP (ref 70–99)
HCT VFR BLD CALC: 46.1 % — SIGNIFICANT CHANGE UP (ref 39–50)
HGB BLD-MCNC: 14.9 G/DL — SIGNIFICANT CHANGE UP (ref 13–17)
MAGNESIUM SERPL-MCNC: 2 MG/DL — SIGNIFICANT CHANGE UP (ref 1.6–2.6)
MCHC RBC-ENTMCNC: 28 PG — SIGNIFICANT CHANGE UP (ref 27–34)
MCHC RBC-ENTMCNC: 32.3 GM/DL — SIGNIFICANT CHANGE UP (ref 32–36)
MCV RBC AUTO: 86.7 FL — SIGNIFICANT CHANGE UP (ref 80–100)
NRBC # BLD: 0 /100 WBCS — SIGNIFICANT CHANGE UP (ref 0–0)
PHOSPHATE SERPL-MCNC: 3.1 MG/DL — SIGNIFICANT CHANGE UP (ref 2.5–4.5)
PLATELET # BLD AUTO: 257 K/UL — SIGNIFICANT CHANGE UP (ref 150–400)
POTASSIUM SERPL-MCNC: 4 MMOL/L — SIGNIFICANT CHANGE UP (ref 3.5–5.3)
POTASSIUM SERPL-SCNC: 4 MMOL/L — SIGNIFICANT CHANGE UP (ref 3.5–5.3)
RBC # BLD: 5.32 M/UL — SIGNIFICANT CHANGE UP (ref 4.2–5.8)
RBC # FLD: 14.6 % — HIGH (ref 10.3–14.5)
SODIUM SERPL-SCNC: 134 MMOL/L — LOW (ref 135–145)
WBC # BLD: 12.08 K/UL — HIGH (ref 3.8–10.5)
WBC # FLD AUTO: 12.08 K/UL — HIGH (ref 3.8–10.5)

## 2020-11-14 RX ORDER — SODIUM CHLORIDE 9 MG/ML
1000 INJECTION, SOLUTION INTRAVENOUS
Refills: 0 | Status: DISCONTINUED | OUTPATIENT
Start: 2020-11-14 | End: 2020-11-16

## 2020-11-14 RX ADMIN — HYDROMORPHONE HYDROCHLORIDE 30 MILLILITER(S): 2 INJECTION INTRAMUSCULAR; INTRAVENOUS; SUBCUTANEOUS at 07:31

## 2020-11-14 RX ADMIN — Medication 15 MILLIGRAM(S): at 18:52

## 2020-11-14 RX ADMIN — Medication 1000 MILLIGRAM(S): at 12:00

## 2020-11-14 RX ADMIN — SODIUM CHLORIDE 115 MILLILITER(S): 9 INJECTION, SOLUTION INTRAVENOUS at 20:46

## 2020-11-14 RX ADMIN — AMLODIPINE BESYLATE 10 MILLIGRAM(S): 2.5 TABLET ORAL at 05:01

## 2020-11-14 RX ADMIN — HYDROMORPHONE HYDROCHLORIDE 30 MILLILITER(S): 2 INJECTION INTRAMUSCULAR; INTRAVENOUS; SUBCUTANEOUS at 19:32

## 2020-11-14 RX ADMIN — SODIUM CHLORIDE 115 MILLILITER(S): 9 INJECTION, SOLUTION INTRAVENOUS at 12:06

## 2020-11-14 RX ADMIN — Medication 15 MILLIGRAM(S): at 05:00

## 2020-11-14 RX ADMIN — ONDANSETRON 4 MILLIGRAM(S): 8 TABLET, FILM COATED ORAL at 10:58

## 2020-11-14 RX ADMIN — Medication 15 MILLIGRAM(S): at 18:10

## 2020-11-14 RX ADMIN — PYRIDOSTIGMINE BROMIDE 180 MILLIGRAM(S): 60 SOLUTION ORAL at 22:45

## 2020-11-14 RX ADMIN — Medication 400 MILLIGRAM(S): at 05:00

## 2020-11-14 RX ADMIN — Medication 400 MILLIGRAM(S): at 10:56

## 2020-11-14 RX ADMIN — PYRIDOSTIGMINE BROMIDE 60 MILLIGRAM(S): 60 SOLUTION ORAL at 17:54

## 2020-11-14 RX ADMIN — PYRIDOSTIGMINE BROMIDE 60 MILLIGRAM(S): 60 SOLUTION ORAL at 05:00

## 2020-11-14 RX ADMIN — ENOXAPARIN SODIUM 40 MILLIGRAM(S): 100 INJECTION SUBCUTANEOUS at 12:08

## 2020-11-14 RX ADMIN — PYRIDOSTIGMINE BROMIDE 60 MILLIGRAM(S): 60 SOLUTION ORAL at 22:45

## 2020-11-14 NOTE — PHYSICAL THERAPY INITIAL EVALUATION ADULT - CRITERIA FOR SKILLED THERAPEUTIC INTERVENTIONS
impairments found/therapy frequency/functional limitations in following categories/risk reduction/prevention/anticipated equipment needs at discharge/anticipated discharge recommendation

## 2020-11-14 NOTE — PROGRESS NOTE ADULT - SUBJECTIVE AND OBJECTIVE BOX
POD 1, pain controlled. tolerating clears    abd soft primary dressing in place        Objective:    MEDICATIONS  (STANDING):  acetaminophen  IVPB .. 1000 milliGRAM(s) IV Intermittent every 6 hours  amLODIPine   Tablet 10 milliGRAM(s) Oral daily  chlorhexidine 2% Cloths 1 Application(s) Topical once  enoxaparin Injectable 40 milliGRAM(s) SubCutaneous daily  HYDROmorphone PCA (1 mG/mL) 30 milliLiter(s) PCA Continuous PCA Continuous  influenza   Vaccine 0.5 milliLiter(s) IntraMuscular once  ketorolac   Injectable 15 milliGRAM(s) IV Push every 8 hours  lactated ringers. 1000 milliLiter(s) (40 mL/Hr) IV Continuous <Continuous>  pyridostigmine 60 milliGRAM(s) Oral three times a day  pyridostigmine  milliGRAM(s) Oral at bedtime    MEDICATIONS  (PRN):  HYDROmorphone PCA (1 mG/mL) Rescue Clinician Bolus 0.5 milliGRAM(s) IV Push every 15 minutes PRN for Pain Scale GREATER THAN 6  meclizine 25 milliGRAM(s) Oral three times a day PRN vertigo  naloxone Injectable 0.1 milliGRAM(s) IV Push every 3 minutes PRN For ANY of the following changes in patient status:  A. RR LESS THAN 10 breaths per minute, B. Oxygen saturation LESS THAN 90%, C. Sedation score of 6  ondansetron Injectable 4 milliGRAM(s) IV Push every 6 hours PRN Nausea      Vital Signs Last 24 Hrs  T(C): 36.6 (14 Nov 2020 09:41), Max: 36.8 (14 Nov 2020 04:50)  T(F): 97.9 (14 Nov 2020 09:41), Max: 98.3 (14 Nov 2020 04:50)  HR: 63 (14 Nov 2020 04:50) (60 - 75)  BP: 121/64 (14 Nov 2020 04:50) (116/66 - 147/87)  BP(mean): 98 (13 Nov 2020 20:00) (84 - 111)  RR: 18 (14 Nov 2020 09:41) (16 - 18)  SpO2: 100% (14 Nov 2020 09:41) (96% - 100%)    I&O's Detail    13 Nov 2020 07:01  -  14 Nov 2020 07:00  --------------------------------------------------------  IN:    IV PiggyBack: 200 mL    Lactated Ringers: 680 mL    Oral Fluid: 60 mL  Total IN: 940 mL    OUT:    Indwelling Catheter - Urethral (mL): 1905 mL  Total OUT: 1905 mL    Total NET: -965 mL          Daily     Daily     LABS:                        14.9   12.08 )-----------( 257      ( 14 Nov 2020 07:12 )             46.1     11-14    134<L>  |  99  |  11  ----------------------------<  97  4.0   |  25  |  1.16    Ca    8.6      14 Nov 2020 07:12  Phos  3.1     11-14  Mg     2.0     11-14            RADIOLOGY & ADDITIONAL STUDIES:

## 2020-11-14 NOTE — PHYSICAL THERAPY INITIAL EVALUATION ADULT - GAIT TRAINING, PT EVAL
GOAL: Patient will ambulate 300 feet independently in 1 week. Pt will be able to neg 1 flight independently in 1 week.

## 2020-11-14 NOTE — PROGRESS NOTE ADULT - SUBJECTIVE AND OBJECTIVE BOX
Day __ of Anesthesia Pain Management Service    SUBJECTIVE: Patient is doing well with IV PCA    Pain Scale Score:	[X] Refer to charted pain scores    THERAPY:    [ ] IV PCA Morphine		[ ] 5 mg/mL	[ ] 1 mg/mL  [X] IV PCA Hydromorphone	[ ] 5 mg/mL	[X] 1 mg/mL  [ ] IV PCA Fentanyl		[ ] 50 micrograms/mL    Demand dose: 0.2 mg     Lockout: 6 minutes   Continuous Rate: 0 mg/hr  4 Hour Limit: 4 mg    MEDICATIONS  (STANDING):  amLODIPine   Tablet 10 milliGRAM(s) Oral daily  chlorhexidine 2% Cloths 1 Application(s) Topical once  dextrose 5% + sodium chloride 0.45% 1000 milliLiter(s) (115 mL/Hr) IV Continuous <Continuous>  enoxaparin Injectable 40 milliGRAM(s) SubCutaneous daily  HYDROmorphone PCA (1 mG/mL) 30 milliLiter(s) PCA Continuous PCA Continuous  influenza   Vaccine 0.5 milliLiter(s) IntraMuscular once  ketorolac   Injectable 15 milliGRAM(s) IV Push every 8 hours  pyridostigmine 60 milliGRAM(s) Oral three times a day  pyridostigmine  milliGRAM(s) Oral at bedtime    MEDICATIONS  (PRN):  HYDROmorphone PCA (1 mG/mL) Rescue Clinician Bolus 0.5 milliGRAM(s) IV Push every 15 minutes PRN for Pain Scale GREATER THAN 6  meclizine 25 milliGRAM(s) Oral three times a day PRN vertigo  naloxone Injectable 0.1 milliGRAM(s) IV Push every 3 minutes PRN For ANY of the following changes in patient status:  A. RR LESS THAN 10 breaths per minute, B. Oxygen saturation LESS THAN 90%, C. Sedation score of 6  ondansetron Injectable 4 milliGRAM(s) IV Push every 6 hours PRN Nausea      OBJECTIVE:    Sedation Score:	[ X] Alert	[ ] Drowsy 	[ ] Arousable	[ ] Asleep	[ ] Unresponsive    Side Effects:	[X ] None	[ ] Nausea	[ ] Vomiting	[ ] Pruritus  		[ ] Other:    Vital Signs Last 24 Hrs  T(C): 36.4 (14 Nov 2020 14:27), Max: 36.8 (14 Nov 2020 04:50)  T(F): 97.6 (14 Nov 2020 14:27), Max: 98.3 (14 Nov 2020 04:50)  HR: 63 (14 Nov 2020 14:27) (52 - 67)  BP: 129/69 (14 Nov 2020 14:27) (121/64 - 142/72)  BP(mean): 98 (13 Nov 2020 20:00) (96 - 100)  RR: 18 (14 Nov 2020 14:27) (16 - 18)  SpO2: 98% (14 Nov 2020 14:27) (96% - 100%)    ASSESSMENT/ PLAN    Therapy to  be:               [X] Continued   [ ] Discontinued   [ ] Changed to PRN Analgesics    Documentation and Verification of current medications:   [X] Done	[ ] Not done, not eligible    Comments:

## 2020-11-14 NOTE — PROVIDER CONTACT NOTE (OTHER) - ASSESSMENT
Patient ambulated to the bathroom with standby assistance and had a small BM. Unable to assess stool because it was flushed. Patient reported that the first time he wiped, there was some blood on the toilet paper, however after a couple wipes the blood went away. No blood present upon assessment. Patient ambulated to the bathroom with standby assistance and had a small BM. Unable to assess stool because it was already flushed. Patient reported that the first time he wiped, there was some blood on the toilet paper, however after a couple wipes the blood went away. No blood present upon assessment.

## 2020-11-15 LAB
ANION GAP SERPL CALC-SCNC: 10 MMOL/L — SIGNIFICANT CHANGE UP (ref 5–17)
BUN SERPL-MCNC: 8 MG/DL — SIGNIFICANT CHANGE UP (ref 7–23)
CALCIUM SERPL-MCNC: 8.6 MG/DL — SIGNIFICANT CHANGE UP (ref 8.4–10.5)
CHLORIDE SERPL-SCNC: 102 MMOL/L — SIGNIFICANT CHANGE UP (ref 96–108)
CO2 SERPL-SCNC: 26 MMOL/L — SIGNIFICANT CHANGE UP (ref 22–31)
CREAT SERPL-MCNC: 1.19 MG/DL — SIGNIFICANT CHANGE UP (ref 0.5–1.3)
GLUCOSE SERPL-MCNC: 100 MG/DL — HIGH (ref 70–99)
HCT VFR BLD CALC: 38.4 % — LOW (ref 39–50)
HCT VFR BLD CALC: 40.5 % — SIGNIFICANT CHANGE UP (ref 39–50)
HCT VFR BLD CALC: 41.5 % — SIGNIFICANT CHANGE UP (ref 39–50)
HGB BLD-MCNC: 12.1 G/DL — LOW (ref 13–17)
HGB BLD-MCNC: 12.6 G/DL — LOW (ref 13–17)
HGB BLD-MCNC: 13.1 G/DL — SIGNIFICANT CHANGE UP (ref 13–17)
MAGNESIUM SERPL-MCNC: 1.9 MG/DL — SIGNIFICANT CHANGE UP (ref 1.6–2.6)
MCHC RBC-ENTMCNC: 27.7 PG — SIGNIFICANT CHANGE UP (ref 27–34)
MCHC RBC-ENTMCNC: 27.8 PG — SIGNIFICANT CHANGE UP (ref 27–34)
MCHC RBC-ENTMCNC: 27.9 PG — SIGNIFICANT CHANGE UP (ref 27–34)
MCHC RBC-ENTMCNC: 31.1 GM/DL — LOW (ref 32–36)
MCHC RBC-ENTMCNC: 31.5 GM/DL — LOW (ref 32–36)
MCHC RBC-ENTMCNC: 31.6 GM/DL — LOW (ref 32–36)
MCV RBC AUTO: 87.9 FL — SIGNIFICANT CHANGE UP (ref 80–100)
MCV RBC AUTO: 88.5 FL — SIGNIFICANT CHANGE UP (ref 80–100)
MCV RBC AUTO: 89 FL — SIGNIFICANT CHANGE UP (ref 80–100)
NRBC # BLD: 0 /100 WBCS — SIGNIFICANT CHANGE UP (ref 0–0)
PHOSPHATE SERPL-MCNC: 2 MG/DL — LOW (ref 2.5–4.5)
PLATELET # BLD AUTO: 192 K/UL — SIGNIFICANT CHANGE UP (ref 150–400)
PLATELET # BLD AUTO: 221 K/UL — SIGNIFICANT CHANGE UP (ref 150–400)
PLATELET # BLD AUTO: 225 K/UL — SIGNIFICANT CHANGE UP (ref 150–400)
POTASSIUM SERPL-MCNC: 4.3 MMOL/L — SIGNIFICANT CHANGE UP (ref 3.5–5.3)
POTASSIUM SERPL-SCNC: 4.3 MMOL/L — SIGNIFICANT CHANGE UP (ref 3.5–5.3)
RBC # BLD: 4.34 M/UL — SIGNIFICANT CHANGE UP (ref 4.2–5.8)
RBC # BLD: 4.55 M/UL — SIGNIFICANT CHANGE UP (ref 4.2–5.8)
RBC # BLD: 4.72 M/UL — SIGNIFICANT CHANGE UP (ref 4.2–5.8)
RBC # FLD: 14.5 % — SIGNIFICANT CHANGE UP (ref 10.3–14.5)
RBC # FLD: 14.5 % — SIGNIFICANT CHANGE UP (ref 10.3–14.5)
RBC # FLD: 14.6 % — HIGH (ref 10.3–14.5)
SODIUM SERPL-SCNC: 138 MMOL/L — SIGNIFICANT CHANGE UP (ref 135–145)
WBC # BLD: 7.85 K/UL — SIGNIFICANT CHANGE UP (ref 3.8–10.5)
WBC # BLD: 8.62 K/UL — SIGNIFICANT CHANGE UP (ref 3.8–10.5)
WBC # BLD: 8.68 K/UL — SIGNIFICANT CHANGE UP (ref 3.8–10.5)
WBC # FLD AUTO: 7.85 K/UL — SIGNIFICANT CHANGE UP (ref 3.8–10.5)
WBC # FLD AUTO: 8.62 K/UL — SIGNIFICANT CHANGE UP (ref 3.8–10.5)
WBC # FLD AUTO: 8.68 K/UL — SIGNIFICANT CHANGE UP (ref 3.8–10.5)

## 2020-11-15 RX ORDER — POTASSIUM PHOSPHATE, MONOBASIC POTASSIUM PHOSPHATE, DIBASIC 236; 224 MG/ML; MG/ML
30 INJECTION, SOLUTION INTRAVENOUS ONCE
Refills: 0 | Status: COMPLETED | OUTPATIENT
Start: 2020-11-15 | End: 2020-11-15

## 2020-11-15 RX ADMIN — PYRIDOSTIGMINE BROMIDE 60 MILLIGRAM(S): 60 SOLUTION ORAL at 21:14

## 2020-11-15 RX ADMIN — PYRIDOSTIGMINE BROMIDE 60 MILLIGRAM(S): 60 SOLUTION ORAL at 14:54

## 2020-11-15 RX ADMIN — SODIUM CHLORIDE 65 MILLILITER(S): 9 INJECTION, SOLUTION INTRAVENOUS at 12:46

## 2020-11-15 RX ADMIN — ENOXAPARIN SODIUM 40 MILLIGRAM(S): 100 INJECTION SUBCUTANEOUS at 12:47

## 2020-11-15 RX ADMIN — HYDROMORPHONE HYDROCHLORIDE 30 MILLILITER(S): 2 INJECTION INTRAMUSCULAR; INTRAVENOUS; SUBCUTANEOUS at 07:59

## 2020-11-15 RX ADMIN — PYRIDOSTIGMINE BROMIDE 180 MILLIGRAM(S): 60 SOLUTION ORAL at 21:14

## 2020-11-15 RX ADMIN — HYDROMORPHONE HYDROCHLORIDE 30 MILLILITER(S): 2 INJECTION INTRAMUSCULAR; INTRAVENOUS; SUBCUTANEOUS at 19:04

## 2020-11-15 RX ADMIN — SODIUM CHLORIDE 65 MILLILITER(S): 9 INJECTION, SOLUTION INTRAVENOUS at 21:14

## 2020-11-15 RX ADMIN — POTASSIUM PHOSPHATE, MONOBASIC POTASSIUM PHOSPHATE, DIBASIC 83.33 MILLIMOLE(S): 236; 224 INJECTION, SOLUTION INTRAVENOUS at 14:53

## 2020-11-15 RX ADMIN — AMLODIPINE BESYLATE 10 MILLIGRAM(S): 2.5 TABLET ORAL at 06:11

## 2020-11-15 RX ADMIN — PYRIDOSTIGMINE BROMIDE 60 MILLIGRAM(S): 60 SOLUTION ORAL at 06:11

## 2020-11-15 NOTE — PROGRESS NOTE ADULT - SUBJECTIVE AND OBJECTIVE BOX
Day __ of Anesthesia Pain Management Service    SUBJECTIVE: Patient is doing well with IV PCA    Pain Scale Score:	[X] Refer to charted pain scores    THERAPY:    [ ] IV PCA Morphine		[ ] 5 mg/mL	[ ] 1 mg/mL  [X] IV PCA Hydromorphone	[ ] 5 mg/mL	[X] 1 mg/mL  [ ] IV PCA Fentanyl		[ ] 50 micrograms/mL    Demand dose: 0.2 mg     Lockout: 6 minutes   Continuous Rate: 0 mg/hr  4 Hour Limit: 4 mg    MEDICATIONS  (STANDING):  amLODIPine   Tablet 10 milliGRAM(s) Oral daily  chlorhexidine 2% Cloths 1 Application(s) Topical once  dextrose 5% + sodium chloride 0.45% 1000 milliLiter(s) (65 mL/Hr) IV Continuous <Continuous>  enoxaparin Injectable 40 milliGRAM(s) SubCutaneous daily  HYDROmorphone PCA (1 mG/mL) 30 milliLiter(s) PCA Continuous PCA Continuous  influenza   Vaccine 0.5 milliLiter(s) IntraMuscular once  potassium phosphate IVPB 30 milliMole(s) IV Intermittent once  pyridostigmine 60 milliGRAM(s) Oral three times a day  pyridostigmine  milliGRAM(s) Oral at bedtime    MEDICATIONS  (PRN):  HYDROmorphone PCA (1 mG/mL) Rescue Clinician Bolus 0.5 milliGRAM(s) IV Push every 15 minutes PRN for Pain Scale GREATER THAN 6  meclizine 25 milliGRAM(s) Oral three times a day PRN vertigo  naloxone Injectable 0.1 milliGRAM(s) IV Push every 3 minutes PRN For ANY of the following changes in patient status:  A. RR LESS THAN 10 breaths per minute, B. Oxygen saturation LESS THAN 90%, C. Sedation score of 6  ondansetron Injectable 4 milliGRAM(s) IV Push every 6 hours PRN Nausea      OBJECTIVE:    Sedation Score:	[ X] Alert	[ ] Drowsy 	[ ] Arousable	[ ] Asleep	[ ] Unresponsive    Side Effects:	[X ] None	[ ] Nausea	[ ] Vomiting	[ ] Pruritus  		[ ] Other:    Vital Signs Last 24 Hrs  T(C): 36.4 (15 Nov 2020 13:44), Max: 37.1 (15 Nov 2020 01:02)  T(F): 97.6 (15 Nov 2020 13:44), Max: 98.8 (15 Nov 2020 01:02)  HR: 75 (15 Nov 2020 13:44) (61 - 75)  BP: 146/73 (15 Nov 2020 13:44) (114/65 - 146/73)  BP(mean): --  RR: 18 (15 Nov 2020 13:44) (18 - 18)  SpO2: 98% (15 Nov 2020 13:44) (96% - 99%)    ASSESSMENT/ PLAN    Therapy to  be:               [X] Continued   [ ] Discontinued   [ ] Changed to PRN Analgesics    Documentation and Verification of current medications:   [X] Done	[ ] Not done, not eligible    Comments:

## 2020-11-15 NOTE — DIETITIAN INITIAL EVALUATION ADULT. - SIGNS/SYMPTOMS
pt with mild muscle/fat losses, 4% unintentional weight loss x 3 months pt with limited knowledge of low fiber diet, protein sources

## 2020-11-15 NOTE — DIETITIAN INITIAL EVALUATION ADULT. - CHIEF COMPLAINT
Pt is a 65 year old male with PMH of Myasthenia Gravis, Vertigo, HLD, Sigmoid Diverticulitis. s/p LAR with takeback for Gomez's Procedure for Anastomotic leak. Now s/p Laparoscopic Colostomy Reversal on 11/13.

## 2020-11-15 NOTE — DIETITIAN INITIAL EVALUATION ADULT. - OTHER INFO
Pt with dosing weight of 164 lb this admission (11/13). Wt history per previous RD note 171 lb (8/1). Pt reports some weight loss PTA, states UBW of 175 pounds. Pt with dosing weight of 164 lb this admission (11/13). Wt history per previous RD note 171 lb (8/1), suggests 7lb weight loss x 3 months (4.1%).    Pt denies any N/V at visit, tolerating CLD. Pt passing flatus, no BM since surgery. Discussed importance of adequate protein intake post-op, reviewed protein sources, pt open to receiving oral nutrition supplements while in house. Provided low-fiber nutrition therapy including importance of avoiding fiber rich foods, fresh fruits/vegetables, whole grains, and added fiber in processed foods. Discussed chewing foods well and adequate hydration. Pt verbalized understanding and accepted written handout.

## 2020-11-15 NOTE — DIETITIAN INITIAL EVALUATION ADULT. - ORAL INTAKE PTA/DIET HISTORY
PTA pt reports good appetite and PO intake PTA, however then states he has had intermittent nausea and pain and was eating less as a result? Does not elaborate on food choices PTA. Endorses intake of multivitamin at home. NKFA. No chewing/swallowing difficulty.

## 2020-11-15 NOTE — DIETITIAN INITIAL EVALUATION ADULT. - ADD RECOMMEND
Consider multivitamin if medically feasible. RD to reinforce low fiber diet/increased protein needs as needed. Continue to monitor PO intake, labs, weights, BM, skin, clinical course. Consider multivitamin if medically feasible. When feasible, consider adding Ensure Enlive 2x daily (700 otoniel and 40 gm protein) when diet advanced. RD to reinforce low fiber diet/increased protein needs as needed. Continue to monitor PO intake, labs, weights, BM, skin, clinical course.

## 2020-11-15 NOTE — DIETITIAN INITIAL EVALUATION ADULT. - PERTINENT MEDS FT
MEDICATIONS  (STANDING):  dextrose 5% + sodium chloride 0.45% 1000 milliLiter(s) (65 mL/Hr) IV Continuous <Continuous>  potassium phosphate IVPB 30 milliMole(s) IV Intermittent once    MEDICATIONS  (PRN):  ondansetron Injectable 4 milliGRAM(s) IV Push every 6 hours PRN Nausea

## 2020-11-15 NOTE — PROGRESS NOTE ADULT - ASSESSMENT
65M h/o myasthenia gravis (on pyridostigmine) and hartmans procedure for diverticulitis now POD2 s/p hartmans reversal    - Diet:  - Pain: PCA  - OOB, ambulate   - IS  - PT rec: Home with assist, no DME needs    red surgery  [id number] 65M h/o myasthenia gravis (on pyridostigmine) and hartmans procedure for diverticulitis now POD2 s/p hartmans reversal    - Diet: Advance to CLD today  - Pain: PCA  - OOB, ambulate   - IS  - PT rec: Home with assist, no DME needs    Red surgery  [id number]

## 2020-11-15 NOTE — CHART NOTE - TREATMENT: THE FOLLOWING DIET HAS BEEN RECOMMENDED
Diet, Clear Liquid:   Fiber/Residue Restricted (LOWFIBER)  Supplement Feeding Modality:  Oral  Ensure Clear Cans or Servings Per Day:  2       Frequency:  Daily (11-15-20 @ 14:39) [Pending Verification By Attending]  Diet, Clear Liquid:   Fiber/Residue Restricted (LOWFIBER) (11-15-20 @ 08:43) [Active]

## 2020-11-15 NOTE — DIETITIAN INITIAL EVALUATION ADULT. - ETIOLOGY
inadequate protein-energy intake 2/2 decreased PO intake PTA decreased exposure to nutrition related topics

## 2020-11-15 NOTE — PROGRESS NOTE ADULT - SUBJECTIVE AND OBJECTIVE BOX
GENERAL SURGERY PROGRESS NOTE    Patient: YUE MARQUEZ , 65y ([birth year])Male   MRN: [id number]  Location: [facility] 9MON 921 W1  Visit: 11-13-20 Inpatient  Date: 11-15-20 @ 05:16      Procedure: Open colostomy reversal    Events of past 24 hours:  - NAEO    Subjective: Patient seen and examined at bedside    PAST MEDICAL & SURGICAL HISTORY:  Diverticulosis    BPH (benign prostatic hyperplasia)    HTN (hypertension)    H/O myasthenia gravis  diagnosed 3 years ago - on pyridostigmine    Diverticulitis  10/2019, 3/2020    Eye muscle weakness, right    High cholesterol    Kidney stones    H/O colonoscopy  3 years ago    Status post Bryn procedure    History of appendectomy  25 years ago        Vitals: T(F): 98.8 (11-15-20 @ 01:02), Max: 98.8 (11-15-20 @ 01:02)  HR: 67 (11-15-20 @ 01:02)  BP: 122/63 (11-15-20 @ 01:02)  RR: 18 (11-15-20 @ 01:02)  SpO2: 98% (11-15-20 @ 01:02)      Diet, NPO:   Except Medications  With Ice Chips/Sips of Water     Special Instructions for Nursing:  Except Medications, With Ice Chips/Sips of Water      11-13-20 @ 07:01  -  11-14-20 @ 07:00  --------------------------------------------------------  IN:    IV PiggyBack: 200 mL    Lactated Ringers: 680 mL    Oral Fluid: 60 mL  Total IN: 940 mL    OUT:    Indwelling Catheter - Urethral (mL): 1905 mL  Total OUT: 1905 mL    Total NET: -965 mL          PHYSICAL EXAM  General: NAD, resting comfortably  Respiratory: nonlabored breathing, normal chest wall expansion  Abdominal: Soft, NT, ND, incisions c/d/i  Vascular: Warm and well perfused  Extremities: No edema    MEDICATIONS  (STANDING):  amLODIPine   Tablet 10 milliGRAM(s) Oral daily  chlorhexidine 2% Cloths 1 Application(s) Topical once  dextrose 5% + sodium chloride 0.45% 1000 milliLiter(s) (115 mL/Hr) IV Continuous <Continuous>  enoxaparin Injectable 40 milliGRAM(s) SubCutaneous daily  HYDROmorphone PCA (1 mG/mL) 30 milliLiter(s) PCA Continuous PCA Continuous  influenza   Vaccine 0.5 milliLiter(s) IntraMuscular once  pyridostigmine 60 milliGRAM(s) Oral three times a day  pyridostigmine  milliGRAM(s) Oral at bedtime    MEDICATIONS  (PRN):  HYDROmorphone PCA (1 mG/mL) Rescue Clinician Bolus 0.5 milliGRAM(s) IV Push every 15 minutes PRN for Pain Scale GREATER THAN 6  meclizine 25 milliGRAM(s) Oral three times a day PRN vertigo  naloxone Injectable 0.1 milliGRAM(s) IV Push every 3 minutes PRN For ANY of the following changes in patient status:  A. RR LESS THAN 10 breaths per minute, B. Oxygen saturation LESS THAN 90%, C. Sedation score of 6  ondansetron Injectable 4 milliGRAM(s) IV Push every 6 hours PRN Nausea      LAB/STUDIES:  CAPILLARY BLOOD GLUCOSE                              14.9   12.08 )-----------( 257      ( 14 Nov 2020 07:12 )             46.1     11-14    134<L>  |  99  |  11  ----------------------------<  97  4.0   |  25  |  1.16    Ca    8.6      14 Nov 2020 07:12  Phos  3.1     11-14  Mg     2.0     11-14                      IMAGING:   GENERAL SURGERY PROGRESS NOTE    Patient: YUE MARQUEZ , 65y ([birth year])Male   MRN: [id number]  Location: [facility] 9MON 921 W1  Visit: 11-13-20 Inpatient  Date: 11-15-20 @ 05:16      Procedure: Open colostomy reversal    Events of past 24 hours:  - NAEO    Subjective: Patient seen and examined at bedside. Feels well this AM. Denies N/V. Ambulating and voiding independently without issue. Pain is adequately controlled.    PAST MEDICAL & SURGICAL HISTORY:  Diverticulosis    BPH (benign prostatic hyperplasia)    HTN (hypertension)    H/O myasthenia gravis  diagnosed 3 years ago - on pyridostigmine    Diverticulitis  10/2019, 3/2020    Eye muscle weakness, right    High cholesterol    Kidney stones    H/O colonoscopy  3 years ago    Status post Brny procedure    History of appendectomy  25 years ago        Vitals: T(F): 98.8 (11-15-20 @ 01:02), Max: 98.8 (11-15-20 @ 01:02)  HR: 67 (11-15-20 @ 01:02)  BP: 122/63 (11-15-20 @ 01:02)  RR: 18 (11-15-20 @ 01:02)  SpO2: 98% (11-15-20 @ 01:02)      Diet, NPO:   Except Medications  With Ice Chips/Sips of Water     Special Instructions for Nursing:  Except Medications, With Ice Chips/Sips of Water      11-13-20 @ 07:01  -  11-14-20 @ 07:00  --------------------------------------------------------  IN:    IV PiggyBack: 200 mL    Lactated Ringers: 680 mL    Oral Fluid: 60 mL  Total IN: 940 mL    OUT:    Indwelling Catheter - Urethral (mL): 1905 mL  Total OUT: 1905 mL    Total NET: -965 mL          PHYSICAL EXAM  General: NAD, resting comfortably  Respiratory: nonlabored breathing, normal chest wall expansion  Abdominal: Soft, NT, ND, incisions c/d/i  Vascular: Warm and well perfused  Extremities: No edema    MEDICATIONS  (STANDING):  amLODIPine   Tablet 10 milliGRAM(s) Oral daily  chlorhexidine 2% Cloths 1 Application(s) Topical once  dextrose 5% + sodium chloride 0.45% 1000 milliLiter(s) (115 mL/Hr) IV Continuous <Continuous>  enoxaparin Injectable 40 milliGRAM(s) SubCutaneous daily  HYDROmorphone PCA (1 mG/mL) 30 milliLiter(s) PCA Continuous PCA Continuous  influenza   Vaccine 0.5 milliLiter(s) IntraMuscular once  pyridostigmine 60 milliGRAM(s) Oral three times a day  pyridostigmine  milliGRAM(s) Oral at bedtime    MEDICATIONS  (PRN):  HYDROmorphone PCA (1 mG/mL) Rescue Clinician Bolus 0.5 milliGRAM(s) IV Push every 15 minutes PRN for Pain Scale GREATER THAN 6  meclizine 25 milliGRAM(s) Oral three times a day PRN vertigo  naloxone Injectable 0.1 milliGRAM(s) IV Push every 3 minutes PRN For ANY of the following changes in patient status:  A. RR LESS THAN 10 breaths per minute, B. Oxygen saturation LESS THAN 90%, C. Sedation score of 6  ondansetron Injectable 4 milliGRAM(s) IV Push every 6 hours PRN Nausea      LABS:                          13.1   8.68  )-----------( 225      ( 15 Nov 2020 06:18 )             41.5     11-15    138  |  102  |  8   ----------------------------<  100<H>  4.3   |  26  |  1.19    Ca    8.6      15 Nov 2020 06:17  Phos  2.0     11-15  Mg     1.9     11-15

## 2020-11-16 ENCOUNTER — TRANSCRIPTION ENCOUNTER (OUTPATIENT)
Age: 65
End: 2020-11-16

## 2020-11-16 VITALS
OXYGEN SATURATION: 99 % | TEMPERATURE: 99 F | RESPIRATION RATE: 18 BRPM | HEART RATE: 80 BPM | SYSTOLIC BLOOD PRESSURE: 145 MMHG | DIASTOLIC BLOOD PRESSURE: 79 MMHG

## 2020-11-16 LAB
ANION GAP SERPL CALC-SCNC: 8 MMOL/L — SIGNIFICANT CHANGE UP (ref 5–17)
BUN SERPL-MCNC: <4 MG/DL — LOW (ref 7–23)
CALCIUM SERPL-MCNC: 8.7 MG/DL — SIGNIFICANT CHANGE UP (ref 8.4–10.5)
CHLORIDE SERPL-SCNC: 104 MMOL/L — SIGNIFICANT CHANGE UP (ref 96–108)
CO2 SERPL-SCNC: 25 MMOL/L — SIGNIFICANT CHANGE UP (ref 22–31)
CREAT SERPL-MCNC: 1 MG/DL — SIGNIFICANT CHANGE UP (ref 0.5–1.3)
GLUCOSE SERPL-MCNC: 92 MG/DL — SIGNIFICANT CHANGE UP (ref 70–99)
HCT VFR BLD CALC: 39.1 % — SIGNIFICANT CHANGE UP (ref 39–50)
HGB BLD-MCNC: 12.2 G/DL — LOW (ref 13–17)
MAGNESIUM SERPL-MCNC: 1.8 MG/DL — SIGNIFICANT CHANGE UP (ref 1.6–2.6)
MCHC RBC-ENTMCNC: 27.5 PG — SIGNIFICANT CHANGE UP (ref 27–34)
MCHC RBC-ENTMCNC: 31.2 GM/DL — LOW (ref 32–36)
MCV RBC AUTO: 88.3 FL — SIGNIFICANT CHANGE UP (ref 80–100)
NRBC # BLD: 0 /100 WBCS — SIGNIFICANT CHANGE UP (ref 0–0)
PHOSPHATE SERPL-MCNC: 2.2 MG/DL — LOW (ref 2.5–4.5)
PLATELET # BLD AUTO: 185 K/UL — SIGNIFICANT CHANGE UP (ref 150–400)
POTASSIUM SERPL-MCNC: 4.1 MMOL/L — SIGNIFICANT CHANGE UP (ref 3.5–5.3)
POTASSIUM SERPL-SCNC: 4.1 MMOL/L — SIGNIFICANT CHANGE UP (ref 3.5–5.3)
RBC # BLD: 4.43 M/UL — SIGNIFICANT CHANGE UP (ref 4.2–5.8)
RBC # FLD: 14.6 % — HIGH (ref 10.3–14.5)
SODIUM SERPL-SCNC: 137 MMOL/L — SIGNIFICANT CHANGE UP (ref 135–145)
WBC # BLD: 6.69 K/UL — SIGNIFICANT CHANGE UP (ref 3.8–10.5)
WBC # FLD AUTO: 6.69 K/UL — SIGNIFICANT CHANGE UP (ref 3.8–10.5)

## 2020-11-16 PROCEDURE — 86900 BLOOD TYPING SEROLOGIC ABO: CPT

## 2020-11-16 PROCEDURE — 82962 GLUCOSE BLOOD TEST: CPT

## 2020-11-16 PROCEDURE — 85027 COMPLETE CBC AUTOMATED: CPT

## 2020-11-16 PROCEDURE — 86850 RBC ANTIBODY SCREEN: CPT

## 2020-11-16 PROCEDURE — 80048 BASIC METABOLIC PNL TOTAL CA: CPT

## 2020-11-16 PROCEDURE — 88307 TISSUE EXAM BY PATHOLOGIST: CPT

## 2020-11-16 PROCEDURE — 83735 ASSAY OF MAGNESIUM: CPT

## 2020-11-16 PROCEDURE — 84100 ASSAY OF PHOSPHORUS: CPT

## 2020-11-16 PROCEDURE — 86922 COMPATIBILITY TEST ANTIGLOB: CPT

## 2020-11-16 PROCEDURE — 88304 TISSUE EXAM BY PATHOLOGIST: CPT

## 2020-11-16 PROCEDURE — 97161 PT EVAL LOW COMPLEX 20 MIN: CPT

## 2020-11-16 PROCEDURE — 86901 BLOOD TYPING SEROLOGIC RH(D): CPT

## 2020-11-16 PROCEDURE — C1889: CPT

## 2020-11-16 RX ORDER — OXYCODONE HYDROCHLORIDE 5 MG/1
5 TABLET ORAL EVERY 4 HOURS
Refills: 0 | Status: DISCONTINUED | OUTPATIENT
Start: 2020-11-16 | End: 2020-11-16

## 2020-11-16 RX ORDER — SODIUM,POTASSIUM PHOSPHATES 278-250MG
1 POWDER IN PACKET (EA) ORAL ONCE
Refills: 0 | Status: COMPLETED | OUTPATIENT
Start: 2020-11-16 | End: 2020-11-16

## 2020-11-16 RX ORDER — OXYCODONE HYDROCHLORIDE 5 MG/1
1 TABLET ORAL
Qty: 12 | Refills: 0
Start: 2020-11-16 | End: 2020-11-18

## 2020-11-16 RX ORDER — OXYCODONE HYDROCHLORIDE 5 MG/1
10 TABLET ORAL EVERY 4 HOURS
Refills: 0 | Status: DISCONTINUED | OUTPATIENT
Start: 2020-11-16 | End: 2020-11-16

## 2020-11-16 RX ORDER — ACETAMINOPHEN 500 MG
975 TABLET ORAL EVERY 6 HOURS
Refills: 0 | Status: DISCONTINUED | OUTPATIENT
Start: 2020-11-16 | End: 2020-11-16

## 2020-11-16 RX ADMIN — Medication 1 PACKET(S): at 13:06

## 2020-11-16 RX ADMIN — PYRIDOSTIGMINE BROMIDE 60 MILLIGRAM(S): 60 SOLUTION ORAL at 06:18

## 2020-11-16 RX ADMIN — AMLODIPINE BESYLATE 10 MILLIGRAM(S): 2.5 TABLET ORAL at 06:18

## 2020-11-16 RX ADMIN — PYRIDOSTIGMINE BROMIDE 60 MILLIGRAM(S): 60 SOLUTION ORAL at 13:06

## 2020-11-16 RX ADMIN — ENOXAPARIN SODIUM 40 MILLIGRAM(S): 100 INJECTION SUBCUTANEOUS at 13:06

## 2020-11-16 NOTE — PROGRESS NOTE ADULT - ASSESSMENT
65M h/o myasthenia gravis (on pyridostigmine) and haartmans procedure for diverticulitis now POD3 s/p haartmans reversal    - Diet: Advance to LRD today  - Pain: Transition PCA to PO pain meds, tylenol+ oxycodone  - OOB, ambulate   - IS  - PT rec: Home with assist, no DME needs    Red surgery  [id number]

## 2020-11-16 NOTE — PROGRESS NOTE ADULT - NUTRITIONAL ASSESSMENT
This patient has been assessed with a concern for Malnutrition and has been determined to have a diagnosis/diagnoses of Mild protein-calorie malnutrition.    This patient is being managed with:   Diet Regular-  Fiber/Residue Restricted (LOWFIBER)  Entered: Nov 16 2020  7:27AM    The following pending diet order is being considered for treatment of Mild protein-calorie malnutrition:  Diet Clear Liquid-  Fiber/Residue Restricted (LOWFIBER)  Supplement Feeding Modality:  Oral  Ensure Clear Cans or Servings Per Day:  2       Frequency:  Daily  Entered: Nov 15 2020  2:39PM  

## 2020-11-16 NOTE — DISCHARGE NOTE PROVIDER - NSDCCPTREATMENT_GEN_ALL_CORE_FT
PRINCIPAL PROCEDURE  Procedure: Reversal of Bryn procedure  Findings and Treatment: Extensive adhesions, laparoscope placed but unable to achieve visualization, and converted to open procedure. Rectal stump identified, and re-resected with contour stapler. Mobilized to facilitate stapler passage. End colostomy taken down, splenic flexure mobilized to achieve length. Small defect in descending colon repaired with vicryl sutures. End to anterior colorectal anastamosis created with 29 mm EEA, insufflation test negative for leak.

## 2020-11-16 NOTE — DISCHARGE NOTE PROVIDER - HOSPITAL COURSE
65 year old male with PMH of Myasthenia Gravis, Vertigo, HLD, Sigmoid Diverticulitis, hospitalized with initial bout in Oct 2019. Patient had 2 episodes within 6 months and required prolonged IV antibiotics via PICC line. He subsequently pursued surgery in August; s/p LAR with takeback for Gomez's Procedure for Anastomotic leak. He is now POD3 s/p Haartmans reversal. 65 year old male with PMH of Myasthenia Gravis, Vertigo, HLD, Sigmoid Diverticulitis, hospitalized with initial bout in Oct 2019. Patient had 2 episodes within 6 months and required prolonged IV antibiotics via PICC line. He subsequently pursued surgery in August; s/p LAR with takeback for Gomez's Procedure for Anastomotic leak. He now presented to the hospital on 11/13 for reversal of cata procedure. In the PACU, the patients' pain was controlled, vitals stable, and voiding appropriately.  The patient was transferred to the surgical floor in stable condition.  On 11/14 he was advanced to clear liquid diet, and pain remained controlled with PCA pump. Patient remained stable and was advanced to low residue diet on 11/16. He was also transitioned to po pain meds from pca pump in which he tolerated. Labs and vitals were closely monitored and repleted and there wre no acute events.   On day of discharge, the patient was tolerating diet, ambulating well and pain controlled. Patient was sent home with VNS for wound packing. He was advised to follow up outpatient in 1 week.  All questions were answered and patient safely discharged.

## 2020-11-16 NOTE — DISCHARGE NOTE PROVIDER - NSDCMRMEDTOKEN_GEN_ALL_CORE_FT
amLODIPine 10 mg oral tablet: 1 tab(s) orally once a day  meclizine 25 mg oral tablet: 1 tab(s) orally 3 times a day, As Needed  pyridostigmine 180 mg oral tablet, extended release: 1 tab(s) orally once a day (at bedtime)  pyridostigmine 60 mg oral tablet: 1 tab(s) orally 4 times a day (pt takes 3-4x daily)   amLODIPine 10 mg oral tablet: 1 tab(s) orally once a day  meclizine 25 mg oral tablet: 1 tab(s) orally 3 times a day, As Needed  oxyCODONE 5 mg oral tablet: 1 tab(s) orally every 6 hours, As Needed -Moderate Pain (4 - 6) MDD:4  pyridostigmine 180 mg oral tablet, extended release: 1 tab(s) orally once a day (at bedtime)  pyridostigmine 60 mg oral tablet: 1 tab(s) orally 4 times a day (pt takes 3-4x daily)

## 2020-11-16 NOTE — DISCHARGE NOTE PROVIDER - NSDCACTIVITY_GEN_ALL_CORE
Do not make important decisions/Showering allowed/No heavy lifting/straining/Do not drive or operate machinery

## 2020-11-16 NOTE — DISCHARGE NOTE PROVIDER - NSDCCPCAREPLAN_GEN_ALL_CORE_FT
PRINCIPAL DISCHARGE DIAGNOSIS  Diagnosis: Diverticulitis of intestine  Assessment and Plan of Treatment: You are now status post LAR with takeback for Gomez's Procedure 8/2020. Now s/p hartmans reversal.   WOUND CARE:  You will have wound dressing changes by a nurse coming to your home. This will be discussed with you with case management.   BATHING: You may shower and/or sponge bathe.  ACTIVITY: No heavy lifting anything more than 10-15lbs or straining. Otherwise, you may return to your usual level of physical activity. If you are taking narcotic pain medication (such as Percocet), do NOT drive a car, operate machinery or make important decisions.  NOTIFY YOUR SURGEON IF: You have any bleeding that does not stop, any fever (over 100.4 F) or chills, persistent nausea/vomiting with inability to tolerate food or liquids, persistent diarrhea, or if your pain is not controlled on your discharge pain medications.  FOLLOW-UP:  Please follow up with  in one week.       PRINCIPAL DISCHARGE DIAGNOSIS  Diagnosis: Diverticulitis of intestine  Assessment and Plan of Treatment: You are now status post LAR with takeback for Gomez's Procedure 8/2020. Now s/p hartmans reversal.   WOUND CARE:   WET TO DRY DRESSINGS IN OLD OSTOMY SITE UNTIL FOLLOW UP WITH DR. SOLOMON (1-2 weeks) You will have wound dressing changes by a nurse coming to your home. This will be discussed with you with case management.   BATHING: You may shower and/or sponge bathe.  ACTIVITY: No heavy lifting anything more than 10-15lbs or straining. Otherwise, you may return to your usual level of physical activity. If you are taking narcotic pain medication (such as Percocet), do NOT drive a car, operate machinery or make important decisions.  NOTIFY YOUR SURGEON IF: You have any bleeding that does not stop, any fever (over 100.4 F) or chills, persistent nausea/vomiting with inability to tolerate food or liquids, persistent diarrhea, or if your pain is not controlled on your discharge pain medications.  FOLLOW-UP:  Please follow up with  in one week.

## 2020-11-16 NOTE — DISCHARGE NOTE NURSING/CASE MANAGEMENT/SOCIAL WORK - PATIENT PORTAL LINK FT
You can access the FollowMyHealth Patient Portal offered by [facility] by registering at the following website: [email or web address]. By joining [facility]’s FollowMyHealth portal, you will also be able to view your health information using other applications (apps) compatible with our system.

## 2020-11-16 NOTE — PROGRESS NOTE ADULT - ATTENDING COMMENTS
s/p colostomy reversal  -OOB  -clears  -dvt ppx  -f/u electrolytes
s/p colostomy reversal  -clears  -oob  -dvt ppx  -pain control
remi clears, no n/v, + flatus and BM, minimal pain    aaox3  abd soft, ND, incision c/d/i    s/p colostomy reversal  advance to LRD  VNS for wound care  d/c planning

## 2020-11-16 NOTE — PROGRESS NOTE ADULT - SUBJECTIVE AND OBJECTIVE BOX
Day 3 of Anesthesia Pain Management Service    SUBJECTIVE: Doing well    Pain Scale Score:	[X] Refer to charted pain scores    THERAPY:    [ ] IV PCA Morphine		        [ ] 5 mg/mL	[ ] 1 mg/mL  [X] IV PCA Hydromorphone	[ ] 5 mg/mL	[X] 1 mg/mL  [ ] IV PCA Fentanyl		        [ ] 50 micrograms/mL    Demand dose: 0.2 mg     Lockout: 6 minutes   Continuous Rate: 0 mg/hr  4 Hour Limit: 4 mg    MEDICATIONS  (STANDING):  amLODIPine   Tablet 10 milliGRAM(s) Oral daily  chlorhexidine 2% Cloths 1 Application(s) Topical once  enoxaparin Injectable 40 milliGRAM(s) SubCutaneous daily  influenza   Vaccine 0.5 milliLiter(s) IntraMuscular once  pyridostigmine 60 milliGRAM(s) Oral three times a day  pyridostigmine  milliGRAM(s) Oral at bedtime    MEDICATIONS  (PRN):  acetaminophen   Tablet .. 975 milliGRAM(s) Oral every 6 hours PRN Mild Pain (1 - 3)  meclizine 25 milliGRAM(s) Oral three times a day PRN vertigo  ondansetron Injectable 4 milliGRAM(s) IV Push every 6 hours PRN Nausea  oxyCODONE    IR 5 milliGRAM(s) Oral every 4 hours PRN Moderate Pain (4 - 6)  oxyCODONE    IR 10 milliGRAM(s) Oral every 4 hours PRN Severe Pain (7 - 10)      OBJECTIVE:    Sedation Score:	[ X] Alert	 [ ] Drowsy 	[ ] Arousable	[ ] Asleep	[ ] Unresponsive    Side Effects:	[X ] None	[ ] Nausea	[ ] Vomiting	[ ] Pruritus  		[ ] Other:    Vital Signs Last 24 Hrs  T(C): 36.8 (16 Nov 2020 06:17), Max: 37 (16 Nov 2020 01:20)  T(F): 98.3 (16 Nov 2020 06:17), Max: 98.6 (16 Nov 2020 01:20)  HR: 66 (16 Nov 2020 06:17) (64 - 77)  BP: 149/76 (16 Nov 2020 06:17) (128/66 - 151/72)  BP(mean): --  RR: 18 (16 Nov 2020 06:17) (18 - 18)  SpO2: 96% (16 Nov 2020 06:17) (96% - 99%)    ASSESSMENT/ PLAN    Therapy to  be:               [  ] Continued   [X ] Discontinued   [ X] Changed to PRN Analgesics    Documentation and Verification of current medications:   [X] Done	[ ] Not done, not eligible    Comments: OOB in chair. PCA D\C'd by surgical service. Transitioned to prn analgesics

## 2020-11-16 NOTE — PROGRESS NOTE ADULT - SUBJECTIVE AND OBJECTIVE BOX
Pain Management Attending Addendum    SUBJECTIVE: Patient doing well with IV PCA    Therapy:    [X] IV PCA         [ ] PRN Analgesics    OBJECTIVE:   [X] Pain appropriately controlled    [ ] Other:    Side Effects:  [X] None	             [ ] Nausea              [ ] Pruritis                	[ ] Other:    ASSESSMENT/PLAN: Discontinue PCA today, change to oral pain medication per primary team    Comments:

## 2020-11-16 NOTE — DISCHARGE NOTE PROVIDER - CARE PROVIDER_API CALL
Ryan Martinez  COLON/RECTAL SURGERY  [address] [place] [address], [address]  [place] [address]  Phone: [phone]  Fax: [phone]  Follow Up Time:

## 2020-11-17 ENCOUNTER — NON-APPOINTMENT (OUTPATIENT)
Age: 65
End: 2020-11-17

## 2020-11-17 PROBLEM — N40.0 BENIGN PROSTATIC HYPERPLASIA WITHOUT LOWER URINARY TRACT SYMPTOMS: Chronic | Status: ACTIVE | Noted: 2020-11-10

## 2020-11-17 PROBLEM — I10 ESSENTIAL (PRIMARY) HYPERTENSION: Chronic | Status: ACTIVE | Noted: 2020-11-10

## 2020-11-17 PROBLEM — K57.90 DIVERTICULOSIS OF INTESTINE, PART UNSPECIFIED, WITHOUT PERFORATION OR ABSCESS WITHOUT BLEEDING: Chronic | Status: ACTIVE | Noted: 2020-11-10

## 2020-11-18 ENCOUNTER — NON-APPOINTMENT (OUTPATIENT)
Age: 65
End: 2020-11-18

## 2020-11-19 ENCOUNTER — NON-APPOINTMENT (OUTPATIENT)
Age: 65
End: 2020-11-19

## 2020-11-25 ENCOUNTER — APPOINTMENT (OUTPATIENT)
Dept: COLORECTAL SURGERY | Facility: CLINIC | Age: 65
End: 2020-11-25
Payer: COMMERCIAL

## 2020-11-25 VITALS
SYSTOLIC BLOOD PRESSURE: 154 MMHG | OXYGEN SATURATION: 99 % | TEMPERATURE: 98.4 F | DIASTOLIC BLOOD PRESSURE: 85 MMHG | HEART RATE: 94 BPM

## 2020-11-25 PROCEDURE — 99024 POSTOP FOLLOW-UP VISIT: CPT

## 2020-11-25 NOTE — PHYSICAL EXAM
[Exam Deferred] : exam was deferred [Normal Breath Sounds] : Normal breath sounds [Wheezing] : no wheezing was heard [Normal Heart Sounds] : normal heart sounds [Normal Rate and Rhythm] : normal rate and rhythm [Alert] : alert [Oriented to Person] : oriented to person [Oriented to Place] : oriented to place [Oriented to Time] : oriented to time [Calm] : calm [de-identified] : soft, NT/ND, midline incision healing well without erythema or induration, staples removed, colostomy site with granulation tissue. Aquacel replaced [de-identified] : NAD [de-identified] : NCAT [de-identified] : supple [de-identified] : normal ROM [de-identified] : warm

## 2020-11-25 NOTE — HISTORY OF PRESENT ILLNESS
[FreeTextEntry1] : The patient has minimal pain and still a diminished appetite but is remi his diet. He is having ill formed BMs daily. He has had a few episodes of clear drainage from the midline wound but not every day. He has VNS TIW for aquacel dressing changes to the old colostomy site

## 2020-11-25 NOTE — ASSESSMENT
[FreeTextEntry1] : s/p exlap, colostomy reversal, healing well\par \par The patient is advised to resume a high fiber diet which will help better bulk his stool.\par He is reassured that it will be a few more weeks before his appetite is back to normal\par COnt wound care with aquacel\par No evidence of wound infection\par f/u here in 3 weeks

## 2020-12-16 ENCOUNTER — APPOINTMENT (OUTPATIENT)
Dept: COLORECTAL SURGERY | Facility: CLINIC | Age: 65
End: 2020-12-16
Payer: COMMERCIAL

## 2020-12-16 ENCOUNTER — LABORATORY RESULT (OUTPATIENT)
Age: 65
End: 2020-12-16

## 2020-12-16 DIAGNOSIS — R30.0 DYSURIA: ICD-10-CM

## 2020-12-16 PROCEDURE — 99024 POSTOP FOLLOW-UP VISIT: CPT

## 2020-12-16 NOTE — PHYSICAL EXAM
[Exam Deferred] : exam was deferred [Normal Breath Sounds] : Normal breath sounds [Wheezing] : no wheezing was heard [Normal Heart Sounds] : normal heart sounds [Normal Rate and Rhythm] : normal rate and rhythm [Alert] : alert [Oriented to Person] : oriented to person [Oriented to Place] : oriented to place [Oriented to Time] : oriented to time [Calm] : calm [de-identified] : soft, NT/ND, midline incision well healed, colostomy site with tiny amount of remaining granulation tissue tx with silver nitrate sticks [de-identified] : NAD [de-identified] : NCAT [de-identified] : supple [de-identified] : normal ROM [de-identified] : warm

## 2020-12-16 NOTE — HISTORY OF PRESENT ILLNESS
[FreeTextEntry1] : Pt reports normal soft BMs. He does c/o daily intermittent abd/suprapubic pain with associated dysuria and urinary frequency

## 2020-12-22 LAB
APPEARANCE: CLEAR
BACTERIA UR CULT: NORMAL
BILIRUBIN URINE: NEGATIVE
BLOOD URINE: NEGATIVE
COLOR: YELLOW
GLUCOSE QUALITATIVE U: NEGATIVE
KETONES URINE: NEGATIVE
LEUKOCYTE ESTERASE URINE: NEGATIVE
NITRITE URINE: NEGATIVE
PH URINE: 6
PROTEIN URINE: ABNORMAL
SPECIFIC GRAVITY URINE: 1.03
UROBILINOGEN URINE: NORMAL

## 2021-01-13 ENCOUNTER — APPOINTMENT (OUTPATIENT)
Dept: COLORECTAL SURGERY | Facility: CLINIC | Age: 66
End: 2021-01-13
Payer: COMMERCIAL

## 2021-01-13 DIAGNOSIS — Z87.19 PERSONAL HISTORY OF OTHER DISEASES OF THE DIGESTIVE SYSTEM: ICD-10-CM

## 2021-01-13 DIAGNOSIS — K91.89 OTHER POSTPROCEDURAL COMPLICATIONS AND DISORDERS OF DIGESTIVE SYSTEM: ICD-10-CM

## 2021-01-13 PROCEDURE — 99024 POSTOP FOLLOW-UP VISIT: CPT

## 2021-01-13 RX ORDER — METRONIDAZOLE 500 MG/1
500 TABLET ORAL
Qty: 3 | Refills: 0 | Status: DISCONTINUED | COMMUNITY
Start: 2020-10-07 | End: 2021-01-13

## 2021-01-13 RX ORDER — NEOMYCIN SULFATE 500 MG/1
500 TABLET ORAL
Qty: 3 | Refills: 0 | Status: DISCONTINUED | COMMUNITY
Start: 2020-10-07 | End: 2021-01-13

## 2021-01-13 NOTE — HISTORY OF PRESENT ILLNESS
[FreeTextEntry1] : The patient feels well. Still with some abd and back pain prior to BMs but his dysuria has resolved. \par He is having semiformed stools daily

## 2021-01-13 NOTE — PHYSICAL EXAM
[Exam Deferred] : exam was deferred [Normal Breath Sounds] : Normal breath sounds [Wheezing] : no wheezing was heard [Normal Heart Sounds] : normal heart sounds [Normal Rate and Rhythm] : normal rate and rhythm [Alert] : alert [Oriented to Person] : oriented to person [Oriented to Place] : oriented to place [Oriented to Time] : oriented to time [Calm] : calm [de-identified] : soft, NT/ND, midline incision well healed, colostomy site well healed [de-identified] : NAD [de-identified] : NCAT [de-identified] : supple [de-identified] : normal ROM [de-identified] : warm

## 2021-01-13 NOTE — ASSESSMENT
[FreeTextEntry1] : The patient is advised to begin a fiber supplement to better regulate his stool\par Will plan for flex sig to evaluate the anastomosis

## 2021-01-15 ENCOUNTER — APPOINTMENT (OUTPATIENT)
Dept: COLORECTAL SURGERY | Facility: CLINIC | Age: 66
End: 2021-01-15
Payer: COMMERCIAL

## 2021-01-15 DIAGNOSIS — R10.9 UNSPECIFIED ABDOMINAL PAIN: ICD-10-CM

## 2021-01-15 PROCEDURE — 99072 ADDL SUPL MATRL&STAF TM PHE: CPT

## 2021-01-15 PROCEDURE — 45330 DIAGNOSTIC SIGMOIDOSCOPY: CPT | Mod: 58

## 2021-12-11 NOTE — DIETITIAN INITIAL EVALUATION ADULT. - PHYSICAL ASSESSMENT SCAPULA
[facility]    Medicine Progress Note - Hospitalist Service       Date of Admission:  12/8/2021    Brief Summary: Yoni Tran is a 23 year old male who presented to [facility] emergency department on 12/7/2021 with suicidal ideation and self injurious behavior.  Past medical history includes autism, ADHD, major depressive disorder, generalized anxiety disorder.  Patient reports 2 days ago attempting to injure himself by pulling a drawer out very fast and hitting his left hand.  He also hit his forehead against the counter.  He was evaluated by emergency room physician.  CT imaging not indicated.  Neurologically stable and transferred to inpatient behavioral health unit at [facility] 12/8/2021 for further management of mental illness.  Hospital medicine consulted for hypertension.      Assessment & Plan         #Elevated blood pressure without diagnosis of hypertension  -blood pressure this morning 152/92 with goal less than 140/90.  Blood pressures on average have been 140s systolic  -Past medical records show clinic visit October 1 with blood pressure 122/84 which is more patient's baseline.  He does take prazosin 4 mg at bedtime for psychiatric illness, which also has some benefit to manage blood pressure  -Mild headache today.  Ibuprofen given.  No blurred vision.  Neurologically intact.  No lower extremity edema. -No history of hypertension or needing to be on antihypertensives.  No current blood work but does not have a history of kidney disease  -I have ordered hydralazine 25 mg as needed for systolic blood pressure greater than 150.    I would not treat blood pressure is 140 systolic as this is likely secondary to patient's psychiatric illness    #Mild head trauma  #Right sided neck pain  -Patient was evaluated by emergency room physician after hitting his forehead against prior to admission. [place] CT Head Injury/Trauma rule indicated patient does not meet criteria for CT  head imaging.  On exam today patient was neurologically intact, blood pressure slightly elevated but otherwise vital signs stable.  I also reviewed [place] CT head and patient does not meet criteria for imaging. I discussed this with patient that we will continue observation.  Patient provided written education on concussion management.  Reviewed signs and symptoms of worsening concussion.  Recommended patient limit TV/screen time to 15 minutes/hour. If light-sensitive may rest with the lights dimmed.    -Right-sided neck examined.  No bruising or deformities seen.  Likely a mild muscle strain.  No imaging indicated.  Patient says the neck pain has almost completely resolved except for he has had these chronic neck twitches since receiving his Covid vaccination.  His doctor had put him on gabapentin for 1 month because of this muscle twitching which he says still happens intermittently.  He was restarted on gabapentin during this admission  -Continue Tylenol or ibuprofen as needed for pain.  I will change the lidocaine gel to 3 times daily as needed for neck pain/twitching  -Medicine will sign off     #Left hand/wrist pain  - small bruise left wrist after patient reported slamming is hand in a kitchen drawere  - Imaging in ED was negative for wrist fracture  - CMS and ROM intact on exam  - Tylenol or ibuprofen for pain.  Ice pack to affected area as needed     #Major depressive disorder  #Self-injurious behavior  #Generalized anxiety disorder  #Autism spectrum disorder  #ADHD  -Psychiatry managing          Diet: Regular Diet Adult  Snacks/Supplements Adult: Ensure Enlive; With Meals    DVT Prophylaxis: Low Risk/Ambulatory with no VTE prophylaxis indicated  [place] Catheter: Not present  Central Lines: None  Code Status: Full Code      Disposition Plan   Expected Discharge:  Per psychiatry team    Total floor/unit time spent was 15 minutes in reviewing the record, medications, lab results and completing documentation.  8 minutes spent in counseling and discussion with patient regarding diagnosis, medications and treatment plan. Care discussed and coordinated with patient and nurse.     [facility]  Hospitalist Service  [facility]  Securely message with the Vocera Web Console (learn more here)  Text page via [facility] Paging/Directory          ______________________________________________________________________    Interval History   I saw patient in his room today.  He is pleasant and cooperative.  Patient tells me the neck pain on the right side that he had a couple days ago is now resolved.  Prior to admission, after he received his Covid vaccinations, he went to his doctor complaining of neck muscle twitching.  At that time he was given gabapentin for 1 month which he thought helped.  He has had some of that intermittent neck twitching again.  Patient says past providers have not been able to see any neck twitching but he can feel it.  He has a mild headache this morning 4 out of 10, received some ibuprofen which has brought the pain down to 2/10.  He thinks the lidocaine gel has helped a little bit with the neck pain but is not sure if it helps with the neck muscle twitching.  No other concerns and denies fever, cough, shortness of breath, chest pain, nausea, constipation.    Review of Systems: 10 point review of systems negative except for pertinent positives mentioned in HPI    Data reviewed today: I reviewed all medications, new labs and imaging results over the last 24 hours. I personally reviewed no images or EKG's today.    Physical Exam   Vital Signs: Temp: 98.5  F (36.9  C) Temp src: Oral BP: (!) 152/91 Pulse: 107   Resp: 16 SpO2: 100 % O2 Device: None (Room air)    Weight: 183 lbs 0 oz  General: Alert, calm, no apparent distress  HEENT: Normocephalic, dime sized bruise upper center of forehead, no laceration/skin intact, no signficant hematoma, mucous membranes pink and moist,  oropharynx without exudate, lymph nodes free and mobile  Respiratory: Lung sounds clear bilaterally, non-labored  Cardiovascular: S1, S2, rhythm rate regular, negative murmur, negative lower extremity edema  Gastrointestinal: Bowel sounds positive x4, nondistended and non-tender  Musculoskeletal: small bruise left wrist, skin intact, cms/rom intact. Right neck; no deformity, skin intact, ROM intact   Neurological: Alert and oriented x3, CARMEN, EOMs intact, speech intact, moves all extremities equally, sensation intact       Data   No lab results found in last 7 days.    No results found for this or any previous visit (from the past 24 hour(s)).  Medications       DULoxetine  60 mg Oral Daily    Followed by     [START ON 12/15/2021] DULoxetine  90 mg Oral Daily     gabapentin  200 mg Oral TID     guanFACINE  4 mg Oral QAM     lamoTRIgine  250 mg Oral QAM     lidocaine   Topical BID     lisdexamfetamine  40 mg Oral QAM     prazosin  4 mg Oral At Bedtime     risperiDONE  1.5 mg Oral At Bedtime     temazepam  30 mg Oral At Bedtime     vilazodone  20 mg Oral Daily      mild

## 2022-01-25 NOTE — PATIENT PROFILE ADULT - CONTRAINDICATIONS & PRECAUTIONS (SELECT ALL THAT APPLY)
Bronchoscopy Note     Patient with acute respiratory failure with hypoxemia/pulmonary infiltrates/mucous plugging/ineffective airway clearance/lung nodules/history of Pseudomonas and fungal infections/chest congestion and given the patient's clinical status it was decided to a bronchoscopy for diagnostic and therapeutic purposes and for that reason after informed consent, the patient was taken to the endoscopy suite, patient was given oropharyngeal analgesia with benzocaine after timeout, patient was given tracheobronchial analgesia with lidocaine, patient was given conscious sedation with IV fentanyl and Versed and the details of the sedation are as following-    Physician/patient of face-to-face sedation start time was 10:30 AM  Physician/patient face-to-face sedation stop time was 10:43 AM  Total moderate sedation time in minutes was 13 minutes  The patient was monitored continuously  throughout the entire procedure while the sedation was being administered  The bronchoscope was introduced to the mouth using a bite block, patient was found to have extensive purulent secretions in the posterior pharynx which had to be removed piece by piece, patient did not have any vocal cord paralysis, patient's entire tracheobronchial tree was full of pus which was significantly purulent/viscous/tenacious, the bronchoscope was wedged into the right lower lobe bronchus and BAL was then formulated which was sent for various culture and cytology, the rest of the tracheobronchial tree was therapeutically aspirated out using Mucomyst and saline with improvement in the patency of the airways, about 200 mL of purulent material was suctioned out from the tracheobronchial tree, patient did not have any endobronchial lesion, patient tolerated the procedure well and did not have any apparent complications  Estimated blood loss was 0  Further management depending on patient's clinical status and the bronchoscopy results    Evans MD Terrance Patient/surrogate refused vaccine...

## 2022-10-20 NOTE — H&P PST ADULT - URINARY CATHETER
no rashes , no suspicious lesions , no areas of discoloration , no jaundice present , good turgor no

## 2023-01-01 NOTE — PRE-OP CHECKLIST - SURGICAL CONSENT
Assessment:     Healthy 4 m.o. male infant. 1. Encounter for well child visit at 1 months of age    3. Need for vaccination  -     DTAP HIB IPV COMBINED VACCINE IM  -     ROTAVIRUS VACCINE PENTAVALENT 3 DOSE ORAL    3. Encounter for immunization  -     Pneumococcal Conjugate Vaccine 20-valent (Pcv20)    4. Encounter for screening for depression         Plan:     KENNY is growing well. Head circumference:    - Went from 14.17 to 16.6 inches in the last 2 months   - Went from 0.3% to 64%   - Mom states her older daughter is tiny with a big head and not worried   - Will watch for now; but [facility] understands if [facility] keeps rising will have to do imaging. Tiny Strawberry Hemangioma of scalp   - Reassurance given    1. Anticipatory guidance discussed. Gave handout on well-child issues at this age. 2. Development: appropriate for age    1. Immunizations today: per orders. Discussed with: mother    4. Follow-up visit in 2 months for next well child visit, or sooner as needed. Subjective:     Johnie Hashimoto. is a 4 m.o. male who is brought in for this well child visit. Current Issues:  Current concerns include Has a red spot on scalp; wondering if its a stork bite. Well Child Assessment:  History was provided by the mother and father. Jimenez Stark lives with his mother and father. Interval problems do not include caregiver depression. Nutrition  Types of milk consumed include formula (similac advanced: 5-6 oz 2-3 hours). Formula - Types of formula consumed include cow's milk based. Feedings occur every 1-3 hours. Feeding problems do not include burping poorly. Dental  The patient has teething symptoms. Tooth eruption is not evident. Sleep  The patient sleeps in his bassinet. Safety  There is an appropriate car seat in use. Screening  Immunizations are up-to-date. Social  The caregiver enjoys the child. Childcare is provided at child's home. The childcare provider is a parent.        Birth History   • Birth     Length: 19" (48.3 cm)     Weight: 2780 g (6 lb 2.1 oz)   • Apgar     One: 9     Five: 9   • Discharge Weight: 2670 g (5 lb 14.2 oz)   • Delivery Method: Vaginal, Spontaneous   • Gestation Age: 40 1/7 wks   • Days in Hospital: 2.0   • Hospital Name: [address] [place] [address] Location: [place], [place]     The following portions of the patient's history were reviewed and updated as appropriate: allergies, current medications, past family history, past medical history, past social history, past surgical history, and problem list.    Developmental 2 Months Appropriate     Question Response Comments    Follows visually through range of 90 degrees Yes  Yes on 2023 (Age - 3 m)    Lifts head momentarily Yes  Yes on 2023 (Age - 3 m)    Social smile Yes  Yes on 2023 (Age - 4 m)      Developmental 4 Months Appropriate     Question Response Comments    Gurgles, coos, babbles, or similar sounds Yes  Yes on 2023 (Age - 3 m)    Follows caretaker's movements by turning head from one side to facing directly forward Yes  Yes on 2023 (Age - 3 m)    Follows parent's movements by turning head from one side almost all the way to the other side Yes  Yes on 2023 (Age - 3 m)    Lifts head off ground when lying prone Yes  Yes on 2023 (Age - 3 m)    Lifts head to 39' off ground when lying prone Yes  Yes on 2023 (Age - 3 m)    Lifts head to 80' off ground when lying prone Yes  Yes on 2023 (Age - 3 m)    Laughs out loud without being tickled or touched Yes  Yes on 2023 (Age - 3 m)    Plays with hands by touching them together Yes  Yes on 2023 (Age - 3 m)    Will follow caretaker's movements by turning head all the way from one side to the other Yes  Yes on 2023 (Age - 3 m)            Objective:     Growth parameters are noted and are appropriate for age. Wt Readings from Last 1 Encounters:   23 5. 993 kg (13 lb 3.4 oz) (7 %, Z= -1.45)* * Growth percentiles are based on WHO (Boys, 0-2 years) data. Ht Readings from Last 1 Encounters:   12/01/23 23.5" (59.7 cm) (2 %, Z= -2.15)*     * Growth percentiles are based on WHO (Boys, 0-2 years) data. <1 %ile (Z= -2.75) based on WHO (Boys, 0-2 years) head circumference-for-age based on Head Circumference recorded on 2023 from contact on 2023. Vitals:    12/01/23 0831   Weight: 5.993 kg (13 lb 3.4 oz)   Height: 23.5" (59.7 cm)   HC: 42.2 cm (16.61")       Physical Exam  Vitals and nursing note reviewed. Constitutional:       General: He is active. He has a strong cry. Appearance: He is well-developed. HENT:      Head: No cranial deformity or facial anomaly. Anterior fontanelle is flat. Right Ear: Tympanic membrane normal.      Left Ear: Tympanic membrane normal.      Nose: Nose normal.      Mouth/Throat:      Mouth: Mucous membranes are moist.      Pharynx: Oropharynx is clear. Eyes:      General: Red reflex is present bilaterally. Conjunctiva/sclera: Conjunctivae normal.      Pupils: Pupils are equal, round, and reactive to light. Cardiovascular:      Rate and Rhythm: Normal rate and regular rhythm. Heart sounds: S1 normal and S2 normal. No murmur heard. Pulmonary:      Effort: Pulmonary effort is normal. No respiratory distress. Breath sounds: Normal breath sounds. Abdominal:      General: Bowel sounds are normal. There is no distension. Palpations: Abdomen is soft. There is no mass. Tenderness: There is no abdominal tenderness. Hernia: No hernia is present. Genitourinary:     Penis: Normal and circumcised. Rectum: Normal.      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 Watson 1. Musculoskeletal:         General: No deformity or signs of injury. Normal range of motion. Cervical back: Normal range of motion. Skin:     General: Skin is warm. Coloration: Skin is not mottled. Findings: No petechiae or rash.    Neurological: Mental Status: He is alert. Primitive Reflexes: Suck normal. Symmetric [place].          Review of Systems done

## 2023-02-08 NOTE — ED PROVIDER NOTE - NEUROLOGICAL, MLM
Quality 226: Preventive Care And Screening: Tobacco Use: Screening And Cessation Intervention: Patient screened for tobacco use and is an ex/non-smoker
Quality 111:Pneumonia Vaccination Status For Older Adults: Pneumococcal vaccine (PPSV23) administered on or after patient’s 60th birthday and before the end of the measurement period
Quality 110: Preventive Care And Screening: Influenza Immunization: Influenza Immunization Administered during Influenza season
Detail Level: Detailed
Alert and oriented, no focal deficits, no motor or sensory deficits.

## 2023-05-16 ENCOUNTER — APPOINTMENT (OUTPATIENT)
Dept: NEUROLOGY | Facility: CLINIC | Age: 68
End: 2023-05-16
Payer: MEDICARE

## 2023-05-16 VITALS
BODY MASS INDEX: 24.88 KG/M2 | DIASTOLIC BLOOD PRESSURE: 68 MMHG | HEIGHT: 69 IN | HEART RATE: 54 BPM | WEIGHT: 168 LBS | SYSTOLIC BLOOD PRESSURE: 139 MMHG

## 2023-05-16 PROCEDURE — 99204 OFFICE O/P NEW MOD 45 MIN: CPT

## 2023-05-17 NOTE — HISTORY OF PRESENT ILLNESS
[FreeTextEntry1] : Mr. Colin is a 67-year-old gentleman referred by Dr. Banerjee for neurological second opinion but did not provide any medical records.  The patient is stated that he was diagnosed as suffering from myasthenia gravis and treated with Mestinon and currently takes 60 mg twice daily and 180 mg at bedtime sustained action drug and mycophenolate 500 mg capsules 2 tablets twice a day and is relatively stable and is stated that originally he had ptosis in the right eye and diffuse body weakness and was evaluated in the hospital in [facility] but did not give any further details and is unaware and does not recall the modes of treatment investigations and there are no records to confirm any of the details of his past investigations and treatment.\par \par He has been followed by Dr. Banerjee and today stated that he wanted to have a neuromuscular consultant and therefore his neurologist referred him to my office and stated that he has diffuse body weakness lack of energy and sometimes staggers on his feet and this has been his original symptom ongoing for 7 years but does not recall of any steroid therapy or IVIG therapy and his information is very limited.  He did not have any chest scars or knowledge of thymectomy and I do not have any significant past medical history and is medical records in the electronic medical system is devoid of any neurological issues.\par \par Past medical history is pertinent for cardiac arrhythmia treated with Eliquis and metoprolol by Dr. Héctor Lawton and history of hypertension treated with amlodipine and had history of diverticulitis it and treated with medication and possible surgical treatment and denied any history of diabetes thyroid disease immunological disorders and there is no other surgical history.\par \par She is 67 years old has no toxic habits  with 2 grownup children and works as a  and there is no family history of immunological disorders or diabetes.\par \par I reviewed his medications and allergies.

## 2023-05-17 NOTE — PHYSICAL EXAM
[FreeTextEntry1] : General examination is unremarkable.  Head neck, ear nose and throat is unremarkable.  There is no carotid bruit thyromegaly or lymphadenopathy.  Chest is clear and heart sounds are normal there is no difficulty in breathing and his respiratory rate is 18/min regular he is intercostal and diaphragmatic breathing is normal.  Neck is supple with full range of motion and there are no signs of meningeal irritation.  Pedal pulsations are present and there is no leg edema.\par \par Neurological examination today is completely normal except for sustained contraction fatigability but there is no ptosis diplopia gag reflex is normal tongue is midline there is no facial or neck muscle weakness and entire forelimb muscle examination is unremarkable except for expressed fatigable weakness. [General Appearance - Alert] : alert [General Appearance - In No Acute Distress] : in no acute distress [Oriented To Time, Place, And Person] : oriented to person, place, and time [Impaired Insight] : insight and judgment were intact [Affect] : the affect was normal [Person] : oriented to person [Place] : oriented to place [Time] : oriented to time [Concentration Intact] : normal concentrating ability [Visual Intact] : visual attention was ~T not ~L decreased [Naming Objects] : no difficulty naming common objects [Repeating Phrases] : no difficulty repeating a phrase [Writing A Sentence] : no difficulty writing a sentence [Fluency] : fluency intact [Reading] : reading intact [Comprehension] : comprehension intact [Past History] : adequate knowledge of personal past history [Cranial Nerves Optic (II)] : visual acuity intact bilaterally,  visual fields full to confrontation, pupils equal round and reactive to light [Cranial Nerves Oculomotor (III)] : extraocular motion intact [Cranial Nerves Facial (VII)] : face symmetrical [Cranial Nerves Trigeminal (V)] : facial sensation intact symmetrically [Cranial Nerves Vestibulocochlear (VIII)] : hearing was intact bilaterally [Cranial Nerves Accessory (XI - Cranial And Spinal)] : head turning and shoulder shrug symmetric [Cranial Nerves Glossopharyngeal (IX)] : tongue and palate midline [Cranial Nerves Hypoglossal (XII)] : there was no tongue deviation with protrusion [Motor Tone] : muscle tone was normal in all four extremities [Motor Strength] : muscle strength was normal in all four extremities [No Muscle Atrophy] : normal bulk in all four extremities [Sensation Tactile Decrease] : light touch was intact [Abnormal Walk] : normal gait [Balance] : balance was intact [Past-pointing] : there was no past-pointing [Tremor] : no tremor present [2+] : Ankle jerk left 2+ [Plantar Reflex Right Only] : normal on the right [Plantar Reflex Left Only] : normal on the left

## 2023-05-17 NOTE — DATA REVIEWED
[de-identified] : I reviewed his electronic medical records with reference to his GI issues and dysuria but there is no neurological work-up available or investigations or pertinence.

## 2023-05-17 NOTE — DISCUSSION/SUMMARY
[FreeTextEntry1] : Opinion–the patient gives history of myasthenia gravis being treated with mycophenolate Mestinon 60 mg twice daily and 180 mg sustained action in the evening but at the present time I do not have any medical information neurological investigations blood test reports and there is nothing in his possession and advised that he must return back to Dr. Banerjee continue the treatment and secure all medical reports of current treatment and past investigations and return to my office after he secures them so that I avoid unnecessary duplication but I need confirmation of his myasthenic disease and may investigate him further if necessary and he expressed understanding.  He has history of cardiac arrhythmia hypertension GI problems and was advised to remain under the care of his physicians and extensive education and counseling was provided and all his questions were answered.  He promised to get the records and return back for reevaluation.  Meanwhile he understands that he must remain under the care of his physicians particularly Dr. Banerjee as at the present time there is no myasthenia gravis exacerbation or crisis and he understands and was educated.

## 2023-05-17 NOTE — REVIEW OF SYSTEMS
[Difficulty Walking] : difficulty walking [As noted in HPI] : as noted in HPI [As Noted in HPI] : as noted in HPI [Negative] : Endocrine

## 2023-09-06 NOTE — PATIENT PROFILE ADULT - PRIMARY SOURCE OF SUPPORT/COMFORT
Body Location Override (Optional - Billing Will Still Be Based On Selected Body Map Location If Applicable): right cheek
Detail Level: Detailed
Add 97853 Cpt? (Important Note: In 2017 The Use Of 97831 Is Being Tracked By Cms To Determine Future Global Period Reimbursement For Global Periods): yes
significant other

## 2023-09-13 ENCOUNTER — APPOINTMENT (OUTPATIENT)
Dept: NEUROLOGY | Facility: CLINIC | Age: 68
End: 2023-09-13

## 2023-10-15 NOTE — DISCHARGE NOTE PROVIDER - NSDCACTIVITY_GEN_ALL_CORE
No heavy lifting/straining/Do not drive or operate machinery Never Walking - Outdoors allowed/No heavy lifting/straining/Stairs allowed/Showering allowed/Do not drive or operate machinery/Walking - Indoors allowed

## 2024-01-04 ENCOUNTER — EMERGENCY (EMERGENCY)
Facility: HOSPITAL | Age: 69
LOS: 0 days | Discharge: HOME | End: 2024-01-04
Attending: STUDENT IN AN ORGANIZED HEALTH CARE EDUCATION/TRAINING PROGRAM
Payer: MEDICARE

## 2024-01-04 VITALS
RESPIRATION RATE: 18 BRPM | SYSTOLIC BLOOD PRESSURE: 143 MMHG | OXYGEN SATURATION: 97 % | DIASTOLIC BLOOD PRESSURE: 71 MMHG | TEMPERATURE: 100 F | HEART RATE: 98 BPM | HEIGHT: 70 IN | WEIGHT: 164.91 LBS

## 2024-01-04 VITALS
RESPIRATION RATE: 18 BRPM | OXYGEN SATURATION: 97 % | TEMPERATURE: 97 F | SYSTOLIC BLOOD PRESSURE: 130 MMHG | DIASTOLIC BLOOD PRESSURE: 71 MMHG | HEART RATE: 65 BPM

## 2024-01-04 DIAGNOSIS — Z93.3 COLOSTOMY STATUS: Chronic | ICD-10-CM

## 2024-01-04 DIAGNOSIS — Z79.01 LONG TERM (CURRENT) USE OF ANTICOAGULANTS: ICD-10-CM

## 2024-01-04 DIAGNOSIS — I10 ESSENTIAL (PRIMARY) HYPERTENSION: ICD-10-CM

## 2024-01-04 DIAGNOSIS — Z87.438 PERSONAL HISTORY OF OTHER DISEASES OF MALE GENITAL ORGANS: ICD-10-CM

## 2024-01-04 DIAGNOSIS — Z87.19 PERSONAL HISTORY OF OTHER DISEASES OF THE DIGESTIVE SYSTEM: ICD-10-CM

## 2024-01-04 DIAGNOSIS — B97.89 OTHER VIRAL AGENTS AS THE CAUSE OF DISEASES CLASSIFIED ELSEWHERE: ICD-10-CM

## 2024-01-04 DIAGNOSIS — Z20.822 CONTACT WITH AND (SUSPECTED) EXPOSURE TO COVID-19: ICD-10-CM

## 2024-01-04 DIAGNOSIS — I48.91 UNSPECIFIED ATRIAL FIBRILLATION: ICD-10-CM

## 2024-01-04 DIAGNOSIS — Z98.89 OTHER SPECIFIED POSTPROCEDURAL STATES: Chronic | ICD-10-CM

## 2024-01-04 DIAGNOSIS — J06.9 ACUTE UPPER RESPIRATORY INFECTION, UNSPECIFIED: ICD-10-CM

## 2024-01-04 DIAGNOSIS — Z98.890 OTHER SPECIFIED POSTPROCEDURAL STATES: Chronic | ICD-10-CM

## 2024-01-04 DIAGNOSIS — G70.00 MYASTHENIA GRAVIS WITHOUT (ACUTE) EXACERBATION: ICD-10-CM

## 2024-01-04 DIAGNOSIS — R05.9 COUGH, UNSPECIFIED: ICD-10-CM

## 2024-01-04 DIAGNOSIS — J18.9 PNEUMONIA, UNSPECIFIED ORGANISM: ICD-10-CM

## 2024-01-04 DIAGNOSIS — R42 DIZZINESS AND GIDDINESS: ICD-10-CM

## 2024-01-04 DIAGNOSIS — R09.81 NASAL CONGESTION: ICD-10-CM

## 2024-01-04 LAB
ALBUMIN SERPL ELPH-MCNC: 3.8 G/DL — SIGNIFICANT CHANGE UP (ref 3.3–5)
ALBUMIN SERPL ELPH-MCNC: 3.8 G/DL — SIGNIFICANT CHANGE UP (ref 3.3–5)
ALP SERPL-CCNC: 60 U/L — SIGNIFICANT CHANGE UP (ref 40–120)
ALP SERPL-CCNC: 60 U/L — SIGNIFICANT CHANGE UP (ref 40–120)
ALT FLD-CCNC: 23 U/L — SIGNIFICANT CHANGE UP (ref 12–78)
ALT FLD-CCNC: 23 U/L — SIGNIFICANT CHANGE UP (ref 12–78)
ANION GAP SERPL CALC-SCNC: 5 MMOL/L — SIGNIFICANT CHANGE UP (ref 5–17)
ANION GAP SERPL CALC-SCNC: 5 MMOL/L — SIGNIFICANT CHANGE UP (ref 5–17)
APPEARANCE UR: CLEAR — SIGNIFICANT CHANGE UP
APPEARANCE UR: CLEAR — SIGNIFICANT CHANGE UP
APTT BLD: 32.7 SEC — SIGNIFICANT CHANGE UP (ref 24.5–35.6)
APTT BLD: 32.7 SEC — SIGNIFICANT CHANGE UP (ref 24.5–35.6)
AST SERPL-CCNC: 21 U/L — SIGNIFICANT CHANGE UP (ref 15–37)
AST SERPL-CCNC: 21 U/L — SIGNIFICANT CHANGE UP (ref 15–37)
BACTERIA # UR AUTO: NEGATIVE /HPF — SIGNIFICANT CHANGE UP
BACTERIA # UR AUTO: NEGATIVE /HPF — SIGNIFICANT CHANGE UP
BASOPHILS # BLD AUTO: 0.05 K/UL — SIGNIFICANT CHANGE UP (ref 0–0.2)
BASOPHILS # BLD AUTO: 0.05 K/UL — SIGNIFICANT CHANGE UP (ref 0–0.2)
BASOPHILS NFR BLD AUTO: 0.3 % — SIGNIFICANT CHANGE UP (ref 0–2)
BASOPHILS NFR BLD AUTO: 0.3 % — SIGNIFICANT CHANGE UP (ref 0–2)
BILIRUB SERPL-MCNC: 2.7 MG/DL — HIGH (ref 0.2–1.2)
BILIRUB SERPL-MCNC: 2.7 MG/DL — HIGH (ref 0.2–1.2)
BILIRUB UR-MCNC: NEGATIVE — SIGNIFICANT CHANGE UP
BILIRUB UR-MCNC: NEGATIVE — SIGNIFICANT CHANGE UP
BUN SERPL-MCNC: 17 MG/DL — SIGNIFICANT CHANGE UP (ref 7–23)
BUN SERPL-MCNC: 17 MG/DL — SIGNIFICANT CHANGE UP (ref 7–23)
CALCIUM SERPL-MCNC: 9 MG/DL — SIGNIFICANT CHANGE UP (ref 8.5–10.1)
CALCIUM SERPL-MCNC: 9 MG/DL — SIGNIFICANT CHANGE UP (ref 8.5–10.1)
CHLORIDE SERPL-SCNC: 106 MMOL/L — SIGNIFICANT CHANGE UP (ref 96–108)
CHLORIDE SERPL-SCNC: 106 MMOL/L — SIGNIFICANT CHANGE UP (ref 96–108)
CO2 SERPL-SCNC: 28 MMOL/L — SIGNIFICANT CHANGE UP (ref 22–31)
CO2 SERPL-SCNC: 28 MMOL/L — SIGNIFICANT CHANGE UP (ref 22–31)
COLOR SPEC: YELLOW — SIGNIFICANT CHANGE UP
COLOR SPEC: YELLOW — SIGNIFICANT CHANGE UP
CREAT SERPL-MCNC: 1.45 MG/DL — HIGH (ref 0.5–1.3)
CREAT SERPL-MCNC: 1.45 MG/DL — HIGH (ref 0.5–1.3)
DIFF PNL FLD: ABNORMAL
DIFF PNL FLD: ABNORMAL
EGFR: 53 ML/MIN/1.73M2 — LOW
EGFR: 53 ML/MIN/1.73M2 — LOW
EOSINOPHIL # BLD AUTO: 0.01 K/UL — SIGNIFICANT CHANGE UP (ref 0–0.5)
EOSINOPHIL # BLD AUTO: 0.01 K/UL — SIGNIFICANT CHANGE UP (ref 0–0.5)
EOSINOPHIL NFR BLD AUTO: 0.1 % — SIGNIFICANT CHANGE UP (ref 0–6)
EOSINOPHIL NFR BLD AUTO: 0.1 % — SIGNIFICANT CHANGE UP (ref 0–6)
EPI CELLS # UR: PRESENT
EPI CELLS # UR: PRESENT
GLUCOSE SERPL-MCNC: 112 MG/DL — HIGH (ref 70–99)
GLUCOSE SERPL-MCNC: 112 MG/DL — HIGH (ref 70–99)
GLUCOSE UR QL: NEGATIVE MG/DL — SIGNIFICANT CHANGE UP
GLUCOSE UR QL: NEGATIVE MG/DL — SIGNIFICANT CHANGE UP
HCT VFR BLD CALC: 45 % — SIGNIFICANT CHANGE UP (ref 39–50)
HCT VFR BLD CALC: 45 % — SIGNIFICANT CHANGE UP (ref 39–50)
HGB BLD-MCNC: 14.5 G/DL — SIGNIFICANT CHANGE UP (ref 13–17)
HGB BLD-MCNC: 14.5 G/DL — SIGNIFICANT CHANGE UP (ref 13–17)
IMM GRANULOCYTES NFR BLD AUTO: 0.6 % — SIGNIFICANT CHANGE UP (ref 0–0.9)
IMM GRANULOCYTES NFR BLD AUTO: 0.6 % — SIGNIFICANT CHANGE UP (ref 0–0.9)
INR BLD: 1.13 RATIO — SIGNIFICANT CHANGE UP (ref 0.85–1.18)
INR BLD: 1.13 RATIO — SIGNIFICANT CHANGE UP (ref 0.85–1.18)
KETONES UR-MCNC: ABNORMAL MG/DL
KETONES UR-MCNC: ABNORMAL MG/DL
LACTATE SERPL-SCNC: 1.5 MMOL/L — SIGNIFICANT CHANGE UP (ref 0.7–2)
LACTATE SERPL-SCNC: 1.5 MMOL/L — SIGNIFICANT CHANGE UP (ref 0.7–2)
LEUKOCYTE ESTERASE UR-ACNC: NEGATIVE — SIGNIFICANT CHANGE UP
LEUKOCYTE ESTERASE UR-ACNC: NEGATIVE — SIGNIFICANT CHANGE UP
LYMPHOCYTES # BLD AUTO: 0.96 K/UL — LOW (ref 1–3.3)
LYMPHOCYTES # BLD AUTO: 0.96 K/UL — LOW (ref 1–3.3)
LYMPHOCYTES # BLD AUTO: 5.9 % — LOW (ref 13–44)
LYMPHOCYTES # BLD AUTO: 5.9 % — LOW (ref 13–44)
MCHC RBC-ENTMCNC: 27.7 PG — SIGNIFICANT CHANGE UP (ref 27–34)
MCHC RBC-ENTMCNC: 27.7 PG — SIGNIFICANT CHANGE UP (ref 27–34)
MCHC RBC-ENTMCNC: 32.2 G/DL — SIGNIFICANT CHANGE UP (ref 32–36)
MCHC RBC-ENTMCNC: 32.2 G/DL — SIGNIFICANT CHANGE UP (ref 32–36)
MCV RBC AUTO: 86 FL — SIGNIFICANT CHANGE UP (ref 80–100)
MCV RBC AUTO: 86 FL — SIGNIFICANT CHANGE UP (ref 80–100)
MONOCYTES # BLD AUTO: 1.87 K/UL — HIGH (ref 0–0.9)
MONOCYTES # BLD AUTO: 1.87 K/UL — HIGH (ref 0–0.9)
MONOCYTES NFR BLD AUTO: 11.6 % — SIGNIFICANT CHANGE UP (ref 2–14)
MONOCYTES NFR BLD AUTO: 11.6 % — SIGNIFICANT CHANGE UP (ref 2–14)
NEUTROPHILS # BLD AUTO: 13.2 K/UL — HIGH (ref 1.8–7.4)
NEUTROPHILS # BLD AUTO: 13.2 K/UL — HIGH (ref 1.8–7.4)
NEUTROPHILS NFR BLD AUTO: 81.5 % — HIGH (ref 43–77)
NEUTROPHILS NFR BLD AUTO: 81.5 % — HIGH (ref 43–77)
NITRITE UR-MCNC: NEGATIVE — SIGNIFICANT CHANGE UP
NITRITE UR-MCNC: NEGATIVE — SIGNIFICANT CHANGE UP
NRBC # BLD: 0 /100 WBCS — SIGNIFICANT CHANGE UP (ref 0–0)
NRBC # BLD: 0 /100 WBCS — SIGNIFICANT CHANGE UP (ref 0–0)
PH UR: 6 — SIGNIFICANT CHANGE UP (ref 5–8)
PH UR: 6 — SIGNIFICANT CHANGE UP (ref 5–8)
PLATELET # BLD AUTO: 225 K/UL — SIGNIFICANT CHANGE UP (ref 150–400)
PLATELET # BLD AUTO: 225 K/UL — SIGNIFICANT CHANGE UP (ref 150–400)
POTASSIUM SERPL-MCNC: 4.4 MMOL/L — SIGNIFICANT CHANGE UP (ref 3.5–5.3)
POTASSIUM SERPL-MCNC: 4.4 MMOL/L — SIGNIFICANT CHANGE UP (ref 3.5–5.3)
POTASSIUM SERPL-SCNC: 4.4 MMOL/L — SIGNIFICANT CHANGE UP (ref 3.5–5.3)
POTASSIUM SERPL-SCNC: 4.4 MMOL/L — SIGNIFICANT CHANGE UP (ref 3.5–5.3)
PROT SERPL-MCNC: 7.6 GM/DL — SIGNIFICANT CHANGE UP (ref 6–8.3)
PROT SERPL-MCNC: 7.6 GM/DL — SIGNIFICANT CHANGE UP (ref 6–8.3)
PROT UR-MCNC: 30 MG/DL
PROT UR-MCNC: 30 MG/DL
PROTHROM AB SERPL-ACNC: 13.5 SEC — HIGH (ref 9.5–13)
PROTHROM AB SERPL-ACNC: 13.5 SEC — HIGH (ref 9.5–13)
RAPID RVP RESULT: DETECTED
RAPID RVP RESULT: DETECTED
RBC # BLD: 5.23 M/UL — SIGNIFICANT CHANGE UP (ref 4.2–5.8)
RBC # BLD: 5.23 M/UL — SIGNIFICANT CHANGE UP (ref 4.2–5.8)
RBC # FLD: 13.2 % — SIGNIFICANT CHANGE UP (ref 10.3–14.5)
RBC # FLD: 13.2 % — SIGNIFICANT CHANGE UP (ref 10.3–14.5)
RBC CASTS # UR COMP ASSIST: SIGNIFICANT CHANGE UP /HPF (ref 0–4)
RBC CASTS # UR COMP ASSIST: SIGNIFICANT CHANGE UP /HPF (ref 0–4)
RV+EV RNA SPEC QL NAA+PROBE: DETECTED
RV+EV RNA SPEC QL NAA+PROBE: DETECTED
SARS-COV-2 RNA SPEC QL NAA+PROBE: SIGNIFICANT CHANGE UP
SARS-COV-2 RNA SPEC QL NAA+PROBE: SIGNIFICANT CHANGE UP
SODIUM SERPL-SCNC: 139 MMOL/L — SIGNIFICANT CHANGE UP (ref 135–145)
SODIUM SERPL-SCNC: 139 MMOL/L — SIGNIFICANT CHANGE UP (ref 135–145)
SP GR SPEC: 1.02 — SIGNIFICANT CHANGE UP (ref 1–1.03)
SP GR SPEC: 1.02 — SIGNIFICANT CHANGE UP (ref 1–1.03)
UROBILINOGEN FLD QL: 1 MG/DL — SIGNIFICANT CHANGE UP (ref 0.2–1)
UROBILINOGEN FLD QL: 1 MG/DL — SIGNIFICANT CHANGE UP (ref 0.2–1)
WBC # BLD: 16.19 K/UL — HIGH (ref 3.8–10.5)
WBC # BLD: 16.19 K/UL — HIGH (ref 3.8–10.5)
WBC # FLD AUTO: 16.19 K/UL — HIGH (ref 3.8–10.5)
WBC # FLD AUTO: 16.19 K/UL — HIGH (ref 3.8–10.5)
WBC UR QL: SIGNIFICANT CHANGE UP /HPF (ref 0–5)
WBC UR QL: SIGNIFICANT CHANGE UP /HPF (ref 0–5)

## 2024-01-04 PROCEDURE — 99285 EMERGENCY DEPT VISIT HI MDM: CPT

## 2024-01-04 PROCEDURE — 93010 ELECTROCARDIOGRAM REPORT: CPT

## 2024-01-04 PROCEDURE — 71045 X-RAY EXAM CHEST 1 VIEW: CPT | Mod: 26

## 2024-01-04 PROCEDURE — 74177 CT ABD & PELVIS W/CONTRAST: CPT | Mod: 26,MA

## 2024-01-04 PROCEDURE — 71250 CT THORAX DX C-: CPT | Mod: 26,MA

## 2024-01-04 PROCEDURE — 70450 CT HEAD/BRAIN W/O DYE: CPT | Mod: 26,MA

## 2024-01-04 RX ORDER — SODIUM CHLORIDE 9 MG/ML
1000 INJECTION INTRAMUSCULAR; INTRAVENOUS; SUBCUTANEOUS ONCE
Refills: 0 | Status: COMPLETED | OUTPATIENT
Start: 2024-01-04 | End: 2024-01-04

## 2024-01-04 RX ORDER — PYRIDOSTIGMINE BROMIDE 60 MG/5ML
1 SOLUTION ORAL
Qty: 0 | Refills: 0 | DISCHARGE

## 2024-01-04 RX ORDER — MECLIZINE HCL 12.5 MG
1 TABLET ORAL
Qty: 0 | Refills: 0 | DISCHARGE

## 2024-01-04 RX ADMIN — SODIUM CHLORIDE 1000 MILLILITER(S): 9 INJECTION INTRAMUSCULAR; INTRAVENOUS; SUBCUTANEOUS at 05:23

## 2024-01-04 RX ADMIN — Medication 1 TABLET(S): at 11:09

## 2024-01-04 RX ADMIN — Medication 100 MILLIGRAM(S): at 11:09

## 2024-01-04 RX ADMIN — SODIUM CHLORIDE 1000 MILLILITER(S): 9 INJECTION INTRAMUSCULAR; INTRAVENOUS; SUBCUTANEOUS at 10:00

## 2024-01-04 NOTE — ED PROVIDER NOTE - CLINICAL SUMMARY MEDICAL DECISION MAKING FREE TEXT BOX
68 year old male with h/o htn, atrial fib (on eliquis), bph, diverticulitis and myasthenia gravis presents today accompanied with his wife c/o flu like symptoms this morning, pt reports feeling dizziness, cough, nasal congestion, fev 68 year old male with h/o htn, atrial fib (on eliquis), bph, diverticulitis and myasthenia gravis presents today accompanied with his wife c/o flu like symptoms this morning, pt reports feeling dizziness, cough, nasal congestion, fevers and generalized weakness, per wife pt had a bowel movement  in bed due to weakness, on exam pt sounds nasally congested, nontoxic, nonlethargic appearing, able to follow commands without difficulty, otherwise benign exam, pt found to have a fever, sepsis workup ordered, r/o flu, r/o pna, r/o diverticulitis, will reassess and dispo

## 2024-01-04 NOTE — ED PROVIDER NOTE - PROGRESS NOTE DETAILS
Pt was seen and treated by Dr. Angeles Pt remains alert and oriented x 3 denies headache, dizziness, blurred visions, light sensitivities, neck/back/hips/calfs pain, sob, chest pain, nausea, vomiting, fever, chills, abd pain. ct head no acute finding, ct chest right lower lobe pneumonia Pt's lactate is 1.5. Pt has been very comfortable normal pulse ox 96 to 97% in room air Pt's breath sounds are good clear equal bilaterally. Pt is advised to follow up with pcp and take dooxycycline 100 mg bid and augmentin 875 mg bid. ct abd/pelvis no obst no appendicitis.

## 2024-01-04 NOTE — ED PROVIDER NOTE - PATIENT PORTAL LINK FT
You can access the FollowMyHealth Patient Portal offered by [facility] by registering at the following website: [email or web address]. By joining [email or web address]’s FollowMyHealth portal, you will also be able to view your health information using other applications (apps) compatible with our system. You can access the FollowMyHealth Patient Portal offered by [facility] by registering at the following website: [email or web address]. By joining [employer]’s FollowMyHealth portal, you will also be able to view your health information using other applications (apps) compatible with our system.

## 2024-01-04 NOTE — ED PROVIDER NOTE - CARE PLAN
1 Principal Discharge DX:	Dehydration   Principal Discharge DX:	RLL pneumonia  Secondary Diagnosis:	Respiratory infection, upper

## 2024-01-04 NOTE — ED ADULT NURSE NOTE - NSICDXPASTMEDICALHX_GEN_ALL_CORE_FT
PAST MEDICAL HISTORY:  BPH (benign prostatic hyperplasia)     Diverticulitis 10/2019, 3/2020    Diverticulosis     Eye muscle weakness, right     H/O myasthenia gravis diagnosed 3 years ago - on pyridostigmine    High cholesterol     HTN (hypertension)     Kidney stones

## 2024-01-04 NOTE — ED ADULT NURSE NOTE - NSFALLUNIVINTERV_ED_ALL_ED
Bed/Stretcher in lowest position, wheels locked, appropriate side rails in place/Call bell, personal items and telephone in reach/Instruct patient to call for assistance before getting out of bed/chair/stretcher/Non-slip footwear applied when patient is off stretcher/[place] to call system/Physically safe environment - no spills, clutter or unnecessary equipment/Purposeful proactive rounding/Room/bathroom lighting operational, light cord in reach Bed/Stretcher in lowest position, wheels locked, appropriate side rails in place/Call bell, personal items and telephone in reach/Instruct patient to call for assistance before getting out of bed/chair/stretcher/Non-slip footwear applied when patient is off stretcher/[place] to call system/Physically safe environment - no spills, clutter or unnecessary equipment/Purposeful proactive rounding/Room/bathroom lighting operational, light cord in reach

## 2024-01-04 NOTE — ED ADULT NURSE NOTE - CAS DISCH CONDITION
Patient called asking for a recommendation of dermatologist in the area or [facility] system. Please advise.   Stable

## 2024-01-04 NOTE — ED ADULT NURSE REASSESSMENT NOTE - NS ED NURSE REASSESS COMMENT FT1
Received report from nightshift RN, pt A/Ox3, pt resting comfortably in bed, even and unlabored respirations noted, no signs or symptoms of acute distress noted, NS running, about 500 CC remaining, noted steady gait when ambulating, pt denies any dizziness when walking,

## 2024-01-05 LAB
CULTURE RESULTS: NO GROWTH — SIGNIFICANT CHANGE UP
CULTURE RESULTS: NO GROWTH — SIGNIFICANT CHANGE UP
SPECIMEN SOURCE: SIGNIFICANT CHANGE UP
SPECIMEN SOURCE: SIGNIFICANT CHANGE UP

## 2024-01-09 LAB
CULTURE RESULTS: SIGNIFICANT CHANGE UP
SPECIMEN SOURCE: SIGNIFICANT CHANGE UP

## 2024-01-10 NOTE — BRIEF OPERATIVE NOTE - ANTIBIOTIC PROTOCOL
Patient is to continue in the cam boot weightbearing as tolerated in the cam boot.  Elevate for pain and swelling.  May ice as needed for discomfort.  Over-the-counter pain meds as needed for pain control.  Follow-up in orthopedic office in 4 weeks for repeat  weight bearing x-ray right ankle.  
Followed protocol/Cefotetan dosed prior to incision
Followed protocol/cefotetan

## 2024-01-31 NOTE — DISCHARGE NOTE PROVIDER - NSDCACTIVITY_GEN_ALL_CORE
Stairs allowed/Walking - Outdoors allowed/Bathing allowed/Showering allowed/Walking - Indoors allowed/No heavy lifting/straining patient

## 2024-03-25 NOTE — ED PROVIDER NOTE - CPE EDP RESP NORM
Quality 130: Documentation Of Current Medications In The Medical Record: Current Medications Documented Detail Level: Detailed Quality 226: Preventive Care And Screening: Tobacco Use: Screening And Cessation Intervention: Patient screened for tobacco use and is an ex/non-smoker normal...

## 2024-05-21 ENCOUNTER — APPOINTMENT (OUTPATIENT)
Dept: NEUROLOGY | Facility: CLINIC | Age: 69
End: 2024-05-21
Payer: MEDICARE

## 2024-05-21 VITALS
DIASTOLIC BLOOD PRESSURE: 72 MMHG | HEIGHT: 69 IN | BODY MASS INDEX: 24.88 KG/M2 | WEIGHT: 168 LBS | HEART RATE: 53 BPM | SYSTOLIC BLOOD PRESSURE: 140 MMHG

## 2024-05-21 DIAGNOSIS — G70.00 MYASTHENIA GRAVIS W/OUT (ACUTE) EXACERBATION: ICD-10-CM

## 2024-05-21 PROCEDURE — 99214 OFFICE O/P EST MOD 30 MIN: CPT

## 2024-05-22 PROBLEM — G70.00 MYASTHENIA GRAVIS: Status: ACTIVE | Noted: 2020-05-15

## 2024-05-22 RX ORDER — MYCOPHENOLATE MOFETIL 500 MG/1
500 TABLET, FILM COATED ORAL
Refills: 0 | Status: ACTIVE | COMMUNITY

## 2024-05-22 NOTE — PHYSICAL EXAM
[FreeTextEntry1] : Vital signs were recorded and unremarkable.  There is no irregularity of the cardiac rhythm today heart sounds are normal chest is clear his respiratory rate is 16/min and there is no labored breathing neck is supple with full range of motion abdomen is soft pedal pulsations are present.  Neurological examination revealed normal memory language cognitive and behavioral functions.  He appears to have good insight and judgment and there is no confusion.  Optic disks are normal visual fields are full and extraocular movements are full and there is no ptosis or diplopia he has mild slurring of speech and gag reflex is still brisk and symmetric and tongue movements are normal but he believes that his swallowing is somewhat slow but neck muscles of 5/5 and he complained of generalized fatigue but individual muscle strength testing revealed 5/5 strength throughout without atrophy fasciculation or abnormal movements and reflexes are 1+ symmetric without Babinski sign and sensations are preserved for all modalities.  Gait is normal.

## 2024-05-22 NOTE — HISTORY OF PRESENT ILLNESS
[FreeTextEntry1] : Mr. Gilliam a 68-year-old gentleman referred by Dr. Banerjee and today returns back for follow-up evaluation and continues to be on pyridostigmine 60 mg 4 times a day and long-acting 180 mg tablet half a tablet at night and mycophenolate 500 mg 2 tablets twice a day and has been relatively stable.  Today he stated that there is intermittent difficulty in swallowing and also speech seems slightly altered but the symptoms were present before and have not progressed.  He denied any diplopia difficulty in mastication neck muscle weakness but complains of generalized fatigue.  Review of systems is otherwise unremarkable and stated that he has been on Eliquis for atrial fibrillation along with metoprolol and considering his age he never had thymectomy.  I reviewed his past medical history including medications and allergies and there are no additional symptoms.

## 2024-05-22 NOTE — REVIEW OF SYSTEMS
[Difficulties in Speech] : speech difficulties [Hoarseness] : hoarseness [As noted in HPI] : as noted in HPI [As Noted in HPI] : as noted in HPI [Negative] : Heme/Lymph

## 2024-05-22 NOTE — DISCUSSION/SUMMARY
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  Meanwhile I have approached the IV infusion team and his current weight is 168 pounds and because of his cardiac disease with arrhythmia I will avoid volume load and give him 50 g IVIG every 2 weeks initially for at least the next 3 months and if he stabilizes without fatigue dysarthria or dysphagia I may change it to every 4 weeks and the dosage may change depending upon his response and will be infused at [address] and will be closely watched.  I will wait for his call meanwhile he will continue the medication as prescribed.  Education and counseling was provided.

## 2024-07-30 NOTE — ED PROVIDER NOTE - NSICDXPASTSURGICALHX_GEN_ALL_CORE_FT
Toxic metabolic encephalopathy
PAST SURGICAL HISTORY:  H/O colonoscopy 3 years ago    History of appendectomy 25 years ago    Status post Bryn procedure

## 2024-08-16 NOTE — ED PROVIDER NOTE - GASTROINTESTINAL [+], MLM
home?  Keeping your body active can help slow MCI. Exercises like walking can help. Try to stay active mentally too. Read or do things like crossword puzzles if you enjoy doing them.  If you need help coping with MCI, you may want to get support from family, friends, a support group, or a counselor who works with people who have MCI.  Though the future isn't always clear, it can be good to plan ahead with instructions for your care. These are called advanced directives. Having a plan can help make sure that you get the care you want.  Current as of: December 20, 2023  Content Version: 14.1  © 2006-2024 [employer].   Care instructions adapted under license by [employer]. If you have questions about a medical condition or this instruction, always ask your healthcare professional. [employer] disclaims any warranty or liability for your use of this information.           Learning About Being Active as an Older Adult  Why is being active important as you get older?     Being active is one of the best things you can do for your health. And it's never too late to start. Being active--or getting active, if you aren't already--has definite benefits. It can:  Give you more energy,  Keep your mind sharp.  Improve balance to reduce your risk of falls.  Help you manage chronic illness with fewer medicines.  No matter how old you are, how fit you are, or what health problems you have, there is a form of activity that will work for you. And the more physical activity you can do, the better your overall health will be.  What kinds of activity can help you stay healthy?  Being more active will make your daily activities easier. Physical activity includes planned exercise and things you do in daily life. There are four types of activity:  Aerobic.  Doing aerobic activity makes your heart and lungs strong.  Includes walking, dancing, and gardening.  Aim for at least 2½ hours spread throughout the 
NAUSEA

## 2024-09-28 ENCOUNTER — APPOINTMENT (OUTPATIENT)
Dept: ULTRASOUND IMAGING | Facility: IMAGING CENTER | Age: 69
End: 2024-09-28

## 2024-10-12 ENCOUNTER — APPOINTMENT (OUTPATIENT)
Dept: ULTRASOUND IMAGING | Facility: CLINIC | Age: 69
End: 2024-10-12
Payer: MEDICARE

## 2024-10-12 PROCEDURE — 76705 ECHO EXAM OF ABDOMEN: CPT

## 2024-11-12 NOTE — ED ADULT NURSE NOTE - NS ED NURSE DISCH DISPOSITION
Orders:    TSH, 3rd generation with Free T4 reflex; Future    Stable.  Recommend lifestyle modifications.     Discharged

## 2025-01-09 NOTE — DISCHARGE NOTE PROVIDER - NSDCADMDATE_GEN_ALL_CORE_FT
13-Nov-2020 05:23 Nasir Aguilarwaite [phone] [facility] [phone] Nasir Aguilarwaite [phone] 27-Dec-2023 02:13

## 2025-03-31 NOTE — DIETITIAN INITIAL EVALUATION ADULT. - OTHER INFO
Pertinent Information: 66 yo male with h/o Myasthenia Gravis and vertigo, s/p two recent bouts of diverticulitis. The first episode occurred 10/2019 and the most recent was 3/2020. s/p LAR and sigmoid colectomy for diverticulitis.    Pt reports variable appetite at home for the last few weeks, reports sometimes eating smaller portions than usual. Consumes regular diet with no restrictions. Confirms NKFA. Pt denies chewing/swallowing difficulty, nausea, vomiting, diarrhea, constipation at baseline. Take multivitamin once daily.     Weights: pt reports UBW as ~ 175lbs, reports it fluctuates a little. Weight per previous RD note was 187lbs (3/31/20)--pt questions accuracy of this weight. Weights per [facility] HIE: 175lbs (3/2015), 173lbs (9/17/18), 175lbs (3/16/20).  Current dosing weight is 171lbs, suggesting ~ 2% wt loss x few weeks per pt report.     Pt advanced to low fiber this morning, tolerating well though notes some minor nausea, no emesis, + BM/flatus. Pt amendable to diet education. Provided low-fiber nutrition therapy including importance of avoiding  fiber rich foods, fresh fruits/vegetables, whole grains, and added fiber in processed foods. Discussed chewing foods well and adequate hydration. Pt verbalized understanding and accepted written handout. Patient with no nutrition-related questions at this time. Made aware RD remains available as needed.
ambulatory